# Patient Record
Sex: MALE | Race: WHITE | Employment: OTHER | URBAN - METROPOLITAN AREA
[De-identification: names, ages, dates, MRNs, and addresses within clinical notes are randomized per-mention and may not be internally consistent; named-entity substitution may affect disease eponyms.]

---

## 2021-08-02 ENCOUNTER — OFFICE VISIT (OUTPATIENT)
Dept: URGENT CARE | Facility: PHYSICIAN GROUP | Age: 66
End: 2021-08-02
Payer: MEDICARE

## 2021-08-02 VITALS
WEIGHT: 235 LBS | HEART RATE: 90 BPM | DIASTOLIC BLOOD PRESSURE: 70 MMHG | OXYGEN SATURATION: 97 % | SYSTOLIC BLOOD PRESSURE: 120 MMHG | RESPIRATION RATE: 16 BRPM | BODY MASS INDEX: 30.16 KG/M2 | TEMPERATURE: 98.2 F | HEIGHT: 74 IN

## 2021-08-02 DIAGNOSIS — H18.821 CORNEAL ABRASION OF RIGHT EYE DUE TO CONTACT LENS: ICD-10-CM

## 2021-08-02 PROCEDURE — 99203 OFFICE O/P NEW LOW 30 MIN: CPT | Performed by: NURSE PRACTITIONER

## 2021-08-02 RX ORDER — TOBRAMYCIN AND DEXAMETHASONE 3; 1 MG/ML; MG/ML
1 SUSPENSION/ DROPS OPHTHALMIC
Qty: 5 ML | Refills: 0 | Status: SHIPPED | OUTPATIENT
Start: 2021-08-02 | End: 2021-08-09

## 2021-08-02 RX ORDER — TOBRAMYCIN AND DEXAMETHASONE 3; 1 MG/ML; MG/ML
1 SUSPENSION/ DROPS OPHTHALMIC
Qty: 10 ML | Refills: 0 | Status: SHIPPED | OUTPATIENT
Start: 2021-08-02 | End: 2021-08-02

## 2021-08-02 ASSESSMENT — ENCOUNTER SYMPTOMS
NAUSEA: 0
FOREIGN BODY SENSATION: 1
EYE PAIN: 1
EYE REDNESS: 1
PHOTOPHOBIA: 1
HEADACHES: 0
VOMITING: 0
EYE DISCHARGE: 0
FEVER: 0
DOUBLE VISION: 0
BLURRED VISION: 1
EYE ITCHING: 1

## 2021-08-02 ASSESSMENT — VISUAL ACUITY: OU: 1

## 2021-08-02 NOTE — PROGRESS NOTES
Subjective:     Jimmy Amaya is a 66 y.o. male who presents for Eye Swelling (R eye pain, red, possible scratch, painful to open eye, x2 days )      Eye Problem   The right eye is affected. This is a new problem. The current episode started yesterday (Jimmy comes to clinic today with complaints of right-sided eye pain after removing his contact yesterday.). The problem occurs constantly. The problem has been unchanged. The pain is at a severity of 9/10. The pain is severe. There is no known exposure to pink eye. He wears contacts. Associated symptoms include blurred vision, eye redness, a foreign body sensation, itching and photophobia. Pertinent negatives include no eye discharge (Eye watering), double vision, fever, nausea, recent URI or vomiting. He has tried nothing for the symptoms.         Review of Systems   Constitutional: Negative for fever.   Eyes: Positive for blurred vision, photophobia, pain, redness and itching. Negative for double vision and discharge (Eye watering).   Gastrointestinal: Negative for nausea and vomiting.   Neurological: Negative for headaches.   Endo/Heme/Allergies: Negative for environmental allergies.       PMH: History reviewed. No pertinent past medical history.  ALLERGIES: No Known Allergies  SURGHX: History reviewed. No pertinent surgical history.  SOCHX:   Social History     Socioeconomic History   • Marital status: Unknown     Spouse name: Not on file   • Number of children: Not on file   • Years of education: Not on file   • Highest education level: Not on file   Occupational History   • Not on file   Tobacco Use   • Smoking status: Never Smoker   • Smokeless tobacco: Never Used   Substance and Sexual Activity   • Alcohol use: Not Currently   • Drug use: Never   • Sexual activity: Not on file   Other Topics Concern   • Not on file   Social History Narrative   • Not on file     Social Determinants of Health     Financial Resource Strain:    • Difficulty of Paying Living  "Expenses:    Food Insecurity:    • Worried About Running Out of Food in the Last Year:    • Ran Out of Food in the Last Year:    Transportation Needs:    • Lack of Transportation (Medical):    • Lack of Transportation (Non-Medical):    Physical Activity:    • Days of Exercise per Week:    • Minutes of Exercise per Session:    Stress:    • Feeling of Stress :    Social Connections:    • Frequency of Communication with Friends and Family:    • Frequency of Social Gatherings with Friends and Family:    • Attends [employer] Services:    • Active Member of Clubs or Organizations:    • Attends Club or Organization Meetings:    • Marital Status:    Intimate Partner Violence:    • Fear of Current or Ex-Partner:    • Emotionally Abused:    • Physically Abused:    • Sexually Abused:      FH: History reviewed. No pertinent family history.      Objective:   /70 (BP Location: Left arm, Patient Position: Sitting, BP Cuff Size: Large adult)   Pulse 90   Temp 36.8 °C (98.2 °F) (Temporal)   Resp 16   Ht 1.88 m (6' 2\")   Wt 107 kg (235 lb)   SpO2 97%   BMI 30.17 kg/m²     Physical Exam  Vitals and nursing note reviewed.   Constitutional:       General: He is not in acute distress.     Appearance: Normal appearance. He is normal weight. He is not ill-appearing or toxic-appearing.   HENT:      Head: Normocephalic.      Right Ear: External ear normal.      Left Ear: External ear normal.      Nose: Nose normal.      Mouth/Throat:      Mouth: Mucous membranes are moist.   Eyes:      General: Lids are everted, no foreign bodies appreciated. Vision grossly intact. Gaze aligned appropriately.         Right eye: No foreign body, discharge or hordeolum.         Left eye: No discharge.      Extraocular Movements: Extraocular movements intact.      Conjunctiva/sclera:      Right eye: Right conjunctiva is injected. Chemosis present. No exudate or hemorrhage.     Pupils: Pupils are equal, round, and reactive to light.      Right eye: " Pupil is round, reactive and not sluggish. Corneal abrasion and fluorescein uptake present.      Funduscopic exam:     Right eye: No hemorrhage.   Cardiovascular:      Rate and Rhythm: Normal rate and regular rhythm.   Pulmonary:      Effort: Pulmonary effort is normal.      Breath sounds: Normal breath sounds.   Abdominal:      General: Abdomen is flat.   Musculoskeletal:         General: Normal range of motion.      Cervical back: Normal range of motion and neck supple. No rigidity.   Lymphadenopathy:      Cervical: No cervical adenopathy.   Skin:     General: Skin is warm and dry.   Neurological:      General: No focal deficit present.      Mental Status: He is alert and oriented to person, place, and time. Mental status is at baseline.   Psychiatric:         Mood and Affect: Mood normal.         Behavior: Behavior normal.         Judgment: Judgment normal.         Assessment/Plan:   Assessment    1. Corneal abrasion of right eye due to contact lens  tobramycin-dexamethasone (TOBRADEX) 0.3-0.1 % Suspension    DISCONTINUED: tobramycin-dexamethasone (TOBRADEX) 0.3-0.1 % Suspension     Supportive treatment discussed.  He was encouraged to use refresh eye lubrication every hour for relief of pain.   AVS handout given and reviewed with patient. Pt educated on red flags and when to seek treatment back in ER or UC.

## 2021-08-02 NOTE — PATIENT INSTRUCTIONS
Corneal Abrasion    A corneal abrasion is a scratch or injury to the clear covering over the front of your eye (cornea). Your cornea forms a clear dome that protects your eye and helps to focus your vision. Your cornea is made up of many layers. The surface layer is a single layer of cells (corneal epithelium). It is one of the most sensitive tissues in your body. A corneal abrasion can be very painful.  If a corneal abrasion is not treated, it can become infected and cause an ulcer. This can lead to scarring. A scarred cornea can affect your vision. Sometimes abrasions come back in the same area, even after the original injury has healed (recurrent erosion syndrome).  What are the causes?  This condition may be caused by:  · A poke in the eye.  · A gritty or irritating substance (foreign body) in the eye.  · Excessive eye rubbing.  · Very dry eyes.  · Certain eye infections.  · Contact lenses that fit poorly or are worn for a long period of time. You can also injure your cornea when putting contacts lenses in your eye or taking them out.  · Eye surgery.  Sometimes, the cause is unknown.  What are the signs or symptoms?  Symptoms of this condition include:  · Eye pain. The pain may get worse when your eye is open or when you move your eye.  · A feeling of something stuck in your eye.  · Having trouble keeping your eye open, or not being able to keep it open.  · Tearing and redness.  · Sensitivity to light.  · Blurred vision.  · Headache.  How is this diagnosed?  This condition may be diagnosed based on:  · Your medical history.  · Your symptoms.  · An eye exam. You may work with a health care provider who specializes in diseases and conditions of the eye (ophthalmologist). Before the eye exam, numbing drops may be put into your eye. You may also have dye put in your eye with a dropper or a small paper strip. The dye makes the abrasion easy to see when your ophthalmologist examines your eye with a light. Your  ophthalmologist may look at your eye through an eye scope (slit lamp).  How is this treated?  Treatment may vary depending on the cause of your condition, and it may include:  · Washing out your eye.  · Removing any foreign body.  · Antibiotic drops or ointment to treat an infection.  · Steroid drops or ointment to treat redness, irritation, or inflammation.  · Pain medicine.  · An eye patch to keep your eye closed.  Follow these instructions at home:  Medicines  · Use eye drops or ointments as told by your eye care provider.  · If you were prescribed antibiotic drops or ointment, use them as told by your eye care provider. Do not stop using the antibiotic even if you start to feel better.  · Take over-the-counter and prescription medicines only as told by your eye care provider.  · Do not drive or use heavy machinery while taking prescription pain medicine.  General instructions  · If you have an eye patch, wear it as told by your eye care provider.  ? Do not drive or use machinery while wearing an eye patch. Your ability to  distances will be impaired.  ? Follow instructions from your eye care provider about when to remove the patch.  · Ask your eye care provider whether you can use a cold, wet cloth (compress) on your eye to relieve pain.  · Do not rub or touch your eye. Do not wash out your eye.  · Do not wear contact lenses until your eye care provider says that this is okay.  · Avoid bright light and eye strain.  · Keep all follow-up visits as told by your eye care provider. This is important for preventing infection and vision loss.  Contact a health care provider if:  · You continue to have eye pain and other symptoms for more than 2 days.  · You develop new symptoms, such as redness, tearing, or discharge.  · You have discharge that makes your eyelids stick together in the morning.  · Your eye patch becomes so loose that you can blink your eye.  · Symptoms return after the original abrasion has  healed.  Get help right away if:  · You have severe eye pain that does not get better with medicine.  · You have vision loss.  Summary  · A corneal abrasion is a scratch on the outer layer of the clear covering over the front of your eye (cornea).  · Corneal abrasion can cause eye pain, redness, tearing, and blurred vision.  · This condition is usually treated with medicine to prevent infection and scarring. You also may have to wear an eye patch to cover your eye.  · Let your eye care provider know if your symptoms continue for more than 2 days.  This information is not intended to replace advice given to you by your health care provider. Make sure you discuss any questions you have with your health care provider.  Document Released: 12/15/2001 Document Revised: 11/28/2017 Document Reviewed: 11/28/2017  [employer] Patient Education © 2020 [employer]

## 2022-03-21 ENCOUNTER — HOSPITAL ENCOUNTER (EMERGENCY)
Facility: MEDICAL CENTER | Age: 67
End: 2022-03-21
Attending: EMERGENCY MEDICINE
Payer: MEDICARE

## 2022-03-21 VITALS
TEMPERATURE: 96.8 F | DIASTOLIC BLOOD PRESSURE: 82 MMHG | RESPIRATION RATE: 16 BRPM | HEART RATE: 92 BPM | HEIGHT: 74 IN | OXYGEN SATURATION: 95 % | BODY MASS INDEX: 34.23 KG/M2 | SYSTOLIC BLOOD PRESSURE: 134 MMHG | WEIGHT: 266.76 LBS

## 2022-03-21 DIAGNOSIS — G51.0 BELL'S PALSY: ICD-10-CM

## 2022-03-21 PROCEDURE — 99282 EMERGENCY DEPT VISIT SF MDM: CPT

## 2022-03-21 RX ORDER — VALACYCLOVIR HYDROCHLORIDE 1 G/1
1000 TABLET, FILM COATED ORAL 2 TIMES DAILY
Qty: 14 TABLET | Refills: 0 | Status: SHIPPED | OUTPATIENT
Start: 2022-03-21 | End: 2022-03-28

## 2022-03-21 RX ORDER — PREDNISONE 20 MG/1
60 TABLET ORAL DAILY
Qty: 21 TABLET | Refills: 0 | Status: SHIPPED | OUTPATIENT
Start: 2022-03-21 | End: 2022-03-28

## 2022-03-21 NOTE — ED NOTES
Pt ambulate to room with steady gait, agree with triage notes. Pt presents with left sided facial droop, mild paralysis noted to eyebrow and lip. Pt reports face was completely numb on Friday, trauma to lip from biting his lip. Pt notes numbness leading to trauma. Notes HA x 3 days, denies HA at this time. Notes droop is resolving compared to initial onset.

## 2022-03-21 NOTE — ED TRIAGE NOTES
"Chief Complaint   Patient presents with   • Facial Droop     Left side. Pt states it started Friday. Symptoms have improved since Friday per pt. Facial droop noted. No weakness noted to extremities. Pt alert and oriented, speaking in full sentences.    • Headache     Started Friday.      Pt assisted to triage with above complaint.     /88   Pulse (!) 106   Temp 36 °C (96.8 °F) (Temporal)   Resp 16   Ht 1.88 m (6' 2\")   Wt 121 kg (266 lb 12.1 oz)   SpO2 93%   BMI 34.25 kg/m²       "

## 2022-03-21 NOTE — ED PROVIDER NOTES
ED Provider Note    Scribed for Abhijeet Garcia M.D. by Susan Mcneil. 3/21/2022, 3:31 PM.    Primary care provider: Pcp Pt States None  Means of arrival: Walk-In  History obtained from: Patient  History limited by: None    CHIEF COMPLAINT  Chief Complaint   Patient presents with    Facial Droop     Left side. Pt states it started Friday. Symptoms have improved since Friday per pt. Facial droop noted. No weakness noted to extremities. Pt alert and oriented, speaking in full sentences.     Headache     Started Friday.        HPI  Jimmy Amaya is a 66 y.o. male who presents to the Emergency Department for evaluation of an acute, intermittent, facial numbness onset three days ago. Patient states that about three days ago, he began having numbness in his face. He notes improvement since onset, but states that his symptoms have not completely resolved, prompting him to present to the ED today for further evaluation. Patient has associated headache, left-sided facial droop and lip numbness. Denies any cough, fever, decreased sensation, or difficulty speaking. No alleviating or exacerbating factors reported. No known drug allergies.    REVIEW OF SYSTEMS  As above otherwise all other systems are negative    PAST MEDICAL HISTORY  None noted    SURGICAL HISTORY  patient denies any surgical history    SOCIAL HISTORY  Social History     Tobacco Use    Smoking status: Never Smoker    Smokeless tobacco: Never Used   Vaping Use    Vaping Use: Never used   Substance Use Topics    Alcohol use: Not Currently    Drug use: Never      Social History     Substance and Sexual Activity   Drug Use Never       FAMILY HISTORY  History reviewed. No pertinent family history.    CURRENT MEDICATIONS  Home Medications       Reviewed by Shelly Jean-Baptiste R.N. (Registered Nurse) on 03/21/22 at 1219  Med List Status: <None>     Medication Last Dose Status   methylPREDNISolone (MEDROL, AISHWARYA,) 4 MG tablet  Active   naproxen (NAPROSYN) 500 MG TABS  " Active   naproxen (NAPROSYN) 500 MG TABS  Active                    ALLERGIES  No Known Allergies    PHYSICAL EXAM  VITAL SIGNS: /88   Pulse (!) 106   Temp 36 °C (96.8 °F) (Temporal)   Resp 16   Ht 1.88 m (6' 2\")   Wt 121 kg (266 lb 12.1 oz)   SpO2 93%   BMI 34.25 kg/m²     Constitutional: Well developed, Well nourished, No acute distress, Non-toxic appearance.   HENT: Normocephalic, Atraumatic, Bilateral external ears normal, oropharynx moist, No oral exudates, Nose normal.   Eyes:conjunctiva is normal, there are no signs of exudate.   Neck: Supple, no meningeal signs.  Lymphatic: No lymphadenopathy noted.   Cardiovascular: Regular rate and rhythm without murmurs gallops or rubs.   Thorax & Lungs: No respiratory distress. Breathing comfortably. Lungs are clear to auscultation bilaterally, there are no wheezes no rales. Chest wall is nontender.  Abdomen: Soft, nontender, nondistended. Bowel sounds are present.   Skin: Warm, Dry, No erythema,   Back: No tenderness, No CVA tenderness.  Musculoskeletal: Good range of motion in all major joints. No tenderness to palpation or major deformities noted. Intact distal pulses, no clubbing, no cyanosis, no edema,   Neurologic: Cranial nerves II-XII grossly intact except for CN VII, CN VII deficit - sluggish in forehead, eyelid, and lips but still has good functioning, minimal paralysis no other focal deficits, moving all 4 extremities, 5/5 strength in all 4 extremities  Psychiatric: Affect normal, Judgment normal, Mood normal.       COURSE & MEDICAL DECISION MAKING  Pertinent Labs & Imaging studies reviewed. (See chart for details)    3:31 PM - Patient seen and examined at bedside. After my exam, I explained to the patient and his wife that the patient's symptoms are consistent with Planada Palsy. After explaining the nature of the condition, I discussed the plan of care, which includes sending the patient home with a prescription for medication. Patient " understands and verbalizes agreement to plan of care. I then informed the patient of my plan for discharge, which includes strict return precautions for any new or worsening symptoms. Patient understands and verbalizes agreement to plan of care. Patient is comfortable going home at this time.    Decision Making:  Patient has an isolated cranial nerve VII deficit on the left side.  The patient is unable to fully extend his left short left eyebrow eyelid is unable to fully close and he does have a slight facial droop on the left side.  He is able to smile fully he is able to pull up his eyebrows fully so it is mild paresis on that side.  This is Bell's palsy.  I will start the patient on steroids and Valtrex.  The patient is to follow the primary care physician in 1 week for recheck return if any symptoms worsen.    The patient will return for new or worsening symptoms and is stable at the time of discharge.    The patient is referred to a primary physician for blood pressure management, diabetic screening, and for all other preventative health concerns.    DISPOSITION:  Patient will be discharged home in stable condition.    FOLLOW UP:  Your PCP    Schedule an appointment as soon as possible for a visit in 1 week  For re-check, Return if any symptoms worsen      OUTPATIENT MEDICATIONS:  Discharge Medication List as of 3/21/2022  3:50 PM        START taking these medications    Details   predniSONE (DELTASONE) 20 MG Tab Take 3 Tablets by mouth every day for 7 days., Disp-21 Tablet, R-0, Normal      valacyclovir (VALTREX) 1 GM Tab Take 1 Tablet by mouth 2 times a day for 7 days., Disp-14 Tablet, R-0, Normal              FINAL IMPRESSION  1. Bell's palsy          Susan LOVELL (Jordi), am scribing for, and in the presence of, Abhijeet Garcia M.D..    Electronically signed by: Susan Latham), 3/21/2022    Abhijeet LOVELL M.D. personally performed the services described in this documentation, as  scribed by Susan Mcneil in my presence, and it is both accurate and complete.    C    The note accurately reflects work and decisions made by me.  Abhijeet Garcia M.D.  3/21/2022  7:46 PM

## 2022-10-07 ENCOUNTER — OFFICE VISIT (OUTPATIENT)
Dept: URGENT CARE | Facility: PHYSICIAN GROUP | Age: 67
End: 2022-10-07
Payer: MEDICARE

## 2022-10-07 VITALS
BODY MASS INDEX: 34.14 KG/M2 | OXYGEN SATURATION: 97 % | TEMPERATURE: 98.7 F | SYSTOLIC BLOOD PRESSURE: 144 MMHG | WEIGHT: 266 LBS | HEIGHT: 74 IN | DIASTOLIC BLOOD PRESSURE: 70 MMHG | RESPIRATION RATE: 16 BRPM | HEART RATE: 95 BPM

## 2022-10-07 DIAGNOSIS — L97.509 FOOT ULCER DUE TO SECONDARY DM (HCC): ICD-10-CM

## 2022-10-07 DIAGNOSIS — E13.621 FOOT ULCER DUE TO SECONDARY DM (HCC): ICD-10-CM

## 2022-10-07 PROCEDURE — 99213 OFFICE O/P EST LOW 20 MIN: CPT | Performed by: FAMILY MEDICINE

## 2022-10-07 RX ORDER — SULFAMETHOXAZOLE AND TRIMETHOPRIM 800; 160 MG/1; MG/1
1 TABLET ORAL 2 TIMES DAILY
Qty: 10 TABLET | Refills: 0 | Status: SHIPPED | OUTPATIENT
Start: 2022-10-07 | End: 2022-10-12

## 2022-10-07 ASSESSMENT — ENCOUNTER SYMPTOMS
FEVER: 0
TINGLING: 1

## 2022-10-07 NOTE — PROGRESS NOTES
"Subjective:     Jimmy Amaya is a 67 y.o. male who presents for Foot Ulcer (Ulcer on left foot)    HPI  Pt presents for evaluation of an acute problem  Patient with ulcer on the bottom of left foot  Noticed it a week ago  Having no pain in the area  Chronically has decreased sensation in his feet due to diabetes  Has not had a primary care provider for the last few years and has stopped taking diabetic medications  No significant redness in the foot  No significant swelling  Has some mild discharge from the area    Review of Systems   Constitutional:  Negative for fever.   Neurological:  Positive for tingling.     PMH:  has no past medical history on file.  MEDS:   Current Outpatient Medications:     sulfamethoxazole-trimethoprim (BACTRIM DS) 800-160 MG tablet, Take 1 Tablet by mouth 2 times a day for 5 days., Disp: 10 Tablet, Rfl: 0  ALLERGIES: No Known Allergies  SURGHX: History reviewed. No pertinent surgical history.  SOCHX:  reports that he has never smoked. He has never used smokeless tobacco. He reports that he does not currently use alcohol. He reports that he does not use drugs.     Objective:   BP (!) 144/70   Pulse 95   Temp 37.1 °C (98.7 °F) (Temporal)   Resp 16   Ht 1.88 m (6' 2\")   Wt 121 kg (266 lb)   SpO2 97%   BMI 34.15 kg/m²     Physical Exam  Constitutional:       General: He is not in acute distress.     Appearance: He is well-developed. He is not diaphoretic.   Pulmonary:      Effort: Pulmonary effort is normal.   Skin:     Comments: Left plantar forefoot with  partial thickness ulceration, no subcutaneous fat visible, no active discharge, no significant warmth or surrounding erythema   Neurological:      Mental Status: He is alert.     Assessment/Plan:   Assessment    1. Foot ulcer due to secondary DM (HCC)  - sulfamethoxazole-trimethoprim (BACTRIM DS) 800-160 MG tablet; Take 1 Tablet by mouth 2 times a day for 5 days.  Dispense: 10 Tablet; Refill: 0  - Referral to Podiatry    Patient " with an ulcer of the left plantar forefoot for the past week.  He has decreased sensation throughout his forefoot.  No significant warmth or erythema.  Did recommend caution and course of antibiotics to prevent any significant developing infection as he awaits referral to podiatry.  Area was wrapped with sterile dressings and reviewed local wound care measures.  He will establish with a PCP to get his diabetes under control and follow-up with podiatry for long-term management of ulcer.

## 2022-11-14 ENCOUNTER — HOSPITAL ENCOUNTER (OUTPATIENT)
Facility: MEDICAL CENTER | Age: 67
End: 2022-11-14
Attending: FAMILY MEDICINE
Payer: MEDICARE

## 2022-11-14 ENCOUNTER — OFFICE VISIT (OUTPATIENT)
Dept: MEDICAL GROUP | Facility: PHYSICIAN GROUP | Age: 67
End: 2022-11-14
Payer: MEDICARE

## 2022-11-14 VITALS
OXYGEN SATURATION: 97 % | HEART RATE: 98 BPM | DIASTOLIC BLOOD PRESSURE: 80 MMHG | HEIGHT: 74 IN | BODY MASS INDEX: 33.66 KG/M2 | TEMPERATURE: 98.2 F | SYSTOLIC BLOOD PRESSURE: 138 MMHG | WEIGHT: 262.3 LBS

## 2022-11-14 DIAGNOSIS — E11.42 TYPE 2 DIABETES MELLITUS WITH DIABETIC POLYNEUROPATHY, WITHOUT LONG-TERM CURRENT USE OF INSULIN (HCC): ICD-10-CM

## 2022-11-14 DIAGNOSIS — L97.529 FOOT ULCER, LEFT, WITH UNSPECIFIED SEVERITY (HCC): ICD-10-CM

## 2022-11-14 DIAGNOSIS — R09.89 ABSENT PEDAL PULSES: ICD-10-CM

## 2022-11-14 DIAGNOSIS — R03.0 ELEVATED BLOOD PRESSURE READING IN OFFICE WITHOUT DIAGNOSIS OF HYPERTENSION: ICD-10-CM

## 2022-11-14 DIAGNOSIS — E66.9 OBESITY (BMI 30-39.9): ICD-10-CM

## 2022-11-14 LAB
CREAT UR-MCNC: 110.47 MG/DL
HBA1C MFR BLD: 10.9 % (ref 0–5.6)
INT CON NEG: ABNORMAL
INT CON POS: ABNORMAL
MICROALBUMIN UR-MCNC: 1.7 MG/DL
MICROALBUMIN/CREAT UR: 15 MG/G (ref 0–30)

## 2022-11-14 PROCEDURE — 82043 UR ALBUMIN QUANTITATIVE: CPT

## 2022-11-14 PROCEDURE — 99214 OFFICE O/P EST MOD 30 MIN: CPT | Performed by: FAMILY MEDICINE

## 2022-11-14 PROCEDURE — 83036 HEMOGLOBIN GLYCOSYLATED A1C: CPT | Performed by: FAMILY MEDICINE

## 2022-11-14 PROCEDURE — 82570 ASSAY OF URINE CREATININE: CPT

## 2022-11-14 RX ORDER — ROSUVASTATIN CALCIUM 10 MG/1
10 TABLET, COATED ORAL EVERY EVENING
Qty: 30 TABLET | Refills: 11 | Status: SHIPPED | OUTPATIENT
Start: 2022-11-14 | End: 2023-01-02

## 2022-11-14 RX ORDER — METFORMIN HYDROCHLORIDE 500 MG/1
1000 TABLET, EXTENDED RELEASE ORAL 2 TIMES DAILY
Qty: 180 TABLET | Refills: 3 | Status: SHIPPED | OUTPATIENT
Start: 2022-11-14 | End: 2023-01-02

## 2022-11-14 ASSESSMENT — PATIENT HEALTH QUESTIONNAIRE - PHQ9: CLINICAL INTERPRETATION OF PHQ2 SCORE: 0

## 2022-11-14 NOTE — PROGRESS NOTES
"CHIEF COMPLAINT / REASON FOR VISIT  Jimmy Amaya is a 67 y.o. male that presents today to establish care.    HISTORY OF PRESENT ILLNESS    Diabetes: POCT A1c 10.9    Was on metformin previously at least 10 years, reported that he didn't feel very well on it.   - progressive vision blurring, saw optometrist 3-4 months ago who said he had cataracts     He has been working on his diet recently.   Walks dogs 4-8 miles per day.    Denies exertional chest pain.  Endorses mild lower extremity edema. No orthopnea.    Past Medical History  Type 2 diabetes mellitus with peripheral neuropathy  Left plantar forefoot diabetic foot ulceration (1st MTP and great toe)  Obesity    OBJECTIVE    /80 (BP Location: Left arm, Patient Position: Sitting, BP Cuff Size: Adult)   Pulse 98   Temp 36.8 °C (98.2 °F) (Temporal)   Ht 1.88 m (6' 2\")   Wt 119 kg (262 lb 4.8 oz)   SpO2 97%   BMI 33.68 kg/m²  Body mass index is 33.68 kg/m².    PHYSICAL EXAM  Constitutional: Sitting comfortably, in no acute distress, responds to questions appropriately.  Head: Normocephalic  Eyes:  No conjunctival injection, no scleral icterus, PERRL  Ears: External ear canals clear, TMs pearly grey with visualized bony landmarks and crisp light reflex  Mouth: Oral mucosa moist  Throat: Oropharynx clear without erythema or tonsillar exudates  Neck: Palpable anterior cervical lymph nodes bilateral, nontender, mobile  Heart: Regular S1 S2, no murmurs, rub, or gallops  Lungs: Clear to auscultation bilaterally, no wheezes, rales, or rhonchi  Extremities: No sensation to monofilament in bilateral feet and shins.  Foot ulceration on plantar aspect of left first MTP, preulcerative calluses on bilateral plantar forefeet, fresh open wound left distal great toe.  Pedal pulses are nonpalpable bilaterally (both dorsalis pedis and posterior tibial).  Feet are cold and hairless with dependent rubor    ASSESSMENT & PLAN  1. Type 2 diabetes mellitus with diabetic " polyneuropathy, without long-term current use of insulin (HCC)  Chronic, uncontrolled with hemoglobin A1c 10.9.  Has severe lower extremity peripheral neuropathy with diabetic foot ulceration.  We will reinitiate both metformin (uptitrating to 2 g daily) and semaglutide (Ozempic) injection starting at 0.25 mg once weekly.  Follow-up in 1 month.  - POCT A1C  - Microalbumin Creat Ratio Urine - Clinic Collect; Future  - Diabetic Monofilament Lower Extremity Exam  - Lipid Profile; Future  - CBC WITH DIFFERENTIAL; Future  - Comp Metabolic Panel; Future    2. Foot ulcer, left, with unspecified severity (HCC)  Diabetic foot ulceration at plantar aspect of left first MTP.  Has podiatry referral already ordered from previous urgent care visit, this has been approved by insurance and he agrees to schedule this visit.  His exam is also suspicious for severe peripheral arterial disease, will obtain lower extremity arterial ultrasound with ABIs for further evaluation.  - US-EXTREMITY ARTERY LOWER BILAT W/ONUR (COMBO); Future    3. Absent pedal pulses  - US-EXTREMITY ARTERY LOWER BILAT W/ONUR (COMBO); Future    4. Elevated blood pressure reading in office without diagnosis of hypertension  Blood pressure 138/80.  Ideal less than 130/80.  We will see back in 1 month with repeat blood pressure check  - Lipid Profile; Future  - CBC WITH DIFFERENTIAL; Future  - Comp Metabolic Panel; Future    5. Obesity (BMI 30-39.9)  - Patient identified as having weight management issue.  Appropriate orders and counseling given.  - Lipid Profile; Future  - CBC WITH DIFFERENTIAL; Future  - Comp Metabolic Panel; Future

## 2022-11-14 NOTE — PATIENT INSTRUCTIONS
Metformin 1 tablet per day for 1 week, then 2 tablets per day for 1 week, then 3 tablets per day for 1 week, then final dose of 4 tablets per day (can split into 2 tablets in morning, 2 tablets at night)    Ozempic injections once per week    Schedule podiatry visit for foot ulcers.   Schedule ultrasound to evaluate arterial blood flow to lower extremities (for wound healing)

## 2023-01-02 ENCOUNTER — APPOINTMENT (OUTPATIENT)
Dept: RADIOLOGY | Facility: MEDICAL CENTER | Age: 68
DRG: 872 | End: 2023-01-02
Attending: EMERGENCY MEDICINE
Payer: MEDICARE

## 2023-01-02 ENCOUNTER — APPOINTMENT (OUTPATIENT)
Dept: RADIOLOGY | Facility: IMAGING CENTER | Age: 68
End: 2023-01-02
Attending: FAMILY MEDICINE
Payer: MEDICARE

## 2023-01-02 ENCOUNTER — OFFICE VISIT (OUTPATIENT)
Dept: URGENT CARE | Facility: PHYSICIAN GROUP | Age: 68
End: 2023-01-02
Payer: MEDICARE

## 2023-01-02 ENCOUNTER — HOSPITAL ENCOUNTER (INPATIENT)
Facility: MEDICAL CENTER | Age: 68
LOS: 2 days | DRG: 872 | End: 2023-01-04
Attending: EMERGENCY MEDICINE | Admitting: HOSPITALIST
Payer: MEDICARE

## 2023-01-02 VITALS
DIASTOLIC BLOOD PRESSURE: 68 MMHG | WEIGHT: 245 LBS | HEART RATE: 133 BPM | OXYGEN SATURATION: 94 % | HEIGHT: 74 IN | TEMPERATURE: 99.8 F | SYSTOLIC BLOOD PRESSURE: 144 MMHG | BODY MASS INDEX: 31.44 KG/M2 | RESPIRATION RATE: 18 BRPM

## 2023-01-02 DIAGNOSIS — E11.628 DIABETIC FOOT INFECTION (HCC): ICD-10-CM

## 2023-01-02 DIAGNOSIS — R00.0 TACHYCARDIA: ICD-10-CM

## 2023-01-02 DIAGNOSIS — R11.2 NAUSEA AND VOMITING, UNSPECIFIED VOMITING TYPE: ICD-10-CM

## 2023-01-02 DIAGNOSIS — L97.421 DIABETIC ULCER OF LEFT MIDFOOT ASSOCIATED WITH DIABETES MELLITUS DUE TO UNDERLYING CONDITION, LIMITED TO BREAKDOWN OF SKIN (HCC): ICD-10-CM

## 2023-01-02 DIAGNOSIS — E08.621 DIABETIC ULCER OF LEFT MIDFOOT ASSOCIATED WITH DIABETES MELLITUS DUE TO UNDERLYING CONDITION, LIMITED TO BREAKDOWN OF SKIN (HCC): ICD-10-CM

## 2023-01-02 DIAGNOSIS — M79.672 LEFT FOOT PAIN: ICD-10-CM

## 2023-01-02 DIAGNOSIS — L08.9 DIABETIC FOOT INFECTION (HCC): ICD-10-CM

## 2023-01-02 DIAGNOSIS — L08.9 LEFT FOOT INFECTION: ICD-10-CM

## 2023-01-02 PROBLEM — A41.9 SEPSIS (HCC): Status: ACTIVE | Noted: 2023-01-02

## 2023-01-02 PROBLEM — E11.59 TYPE 2 DIABETES MELLITUS WITH CIRCULATORY DISORDER, WITHOUT LONG-TERM CURRENT USE OF INSULIN (HCC): Status: ACTIVE | Noted: 2023-01-02

## 2023-01-02 LAB
ANION GAP SERPL CALC-SCNC: 11 MMOL/L (ref 7–16)
BASOPHILS # BLD AUTO: 0.2 % (ref 0–1.8)
BASOPHILS # BLD: 0.04 K/UL (ref 0–0.12)
BUN SERPL-MCNC: 19 MG/DL (ref 8–22)
CALCIUM SERPL-MCNC: 9.1 MG/DL (ref 8.4–10.2)
CHLORIDE SERPL-SCNC: 97 MMOL/L (ref 96–112)
CO2 SERPL-SCNC: 22 MMOL/L (ref 20–33)
CREAT SERPL-MCNC: 0.98 MG/DL (ref 0.5–1.4)
CRP SERPL HS-MCNC: 22.64 MG/DL (ref 0–0.75)
EOSINOPHIL # BLD AUTO: 0.01 K/UL (ref 0–0.51)
EOSINOPHIL NFR BLD: 0.1 % (ref 0–6.9)
ERYTHROCYTE [DISTWIDTH] IN BLOOD BY AUTOMATED COUNT: 43.8 FL (ref 35.9–50)
ERYTHROCYTE [SEDIMENTATION RATE] IN BLOOD BY WESTERGREN METHOD: 20 MM/HOUR (ref 0–20)
FLUAV RNA SPEC QL NAA+PROBE: NEGATIVE
FLUAV+FLUBV AG SPEC QL IA: NEGATIVE
FLUBV RNA SPEC QL NAA+PROBE: NEGATIVE
GFR SERPLBLD CREATININE-BSD FMLA CKD-EPI: 85 ML/MIN/1.73 M 2
GLUCOSE BLD STRIP.AUTO-MCNC: 231 MG/DL (ref 65–99)
GLUCOSE SERPL-MCNC: 257 MG/DL (ref 65–99)
HCT VFR BLD AUTO: 47 % (ref 42–52)
HGB BLD-MCNC: 15.5 G/DL (ref 14–18)
IMM GRANULOCYTES # BLD AUTO: 0.17 K/UL (ref 0–0.11)
IMM GRANULOCYTES NFR BLD AUTO: 0.9 % (ref 0–0.9)
INT CON NEG: NORMAL
INT CON POS: NORMAL
LACTATE SERPL-SCNC: 1.6 MMOL/L (ref 0.5–2)
LACTATE SERPL-SCNC: 1.8 MMOL/L (ref 0.5–2)
LYMPHOCYTES # BLD AUTO: 1.04 K/UL (ref 1–4.8)
LYMPHOCYTES NFR BLD: 5.5 % (ref 22–41)
MCH RBC QN AUTO: 28.1 PG (ref 27–33)
MCHC RBC AUTO-ENTMCNC: 33 G/DL (ref 33.7–35.3)
MCV RBC AUTO: 85.1 FL (ref 81.4–97.8)
MONOCYTES # BLD AUTO: 1.52 K/UL (ref 0–0.85)
MONOCYTES NFR BLD AUTO: 8.1 % (ref 0–13.4)
NEUTROPHILS # BLD AUTO: 16.1 K/UL (ref 1.82–7.42)
NEUTROPHILS NFR BLD: 85.2 % (ref 44–72)
NRBC # BLD AUTO: 0 K/UL
NRBC BLD-RTO: 0 /100 WBC
PLATELET # BLD AUTO: 221 K/UL (ref 164–446)
PMV BLD AUTO: 10.2 FL (ref 9–12.9)
POTASSIUM SERPL-SCNC: 4.3 MMOL/L (ref 3.6–5.5)
RBC # BLD AUTO: 5.52 M/UL (ref 4.7–6.1)
RSV RNA SPEC QL NAA+PROBE: NEGATIVE
SARS-COV-2 RNA RESP QL NAA+PROBE: NOTDETECTED
SODIUM SERPL-SCNC: 130 MMOL/L (ref 135–145)
SPECIMEN SOURCE: NORMAL
WBC # BLD AUTO: 18.9 K/UL (ref 4.8–10.8)

## 2023-01-02 PROCEDURE — 87804 INFLUENZA ASSAY W/OPTIC: CPT | Performed by: FAMILY MEDICINE

## 2023-01-02 PROCEDURE — 96365 THER/PROPH/DIAG IV INF INIT: CPT

## 2023-01-02 PROCEDURE — 770001 HCHG ROOM/CARE - MED/SURG/GYN PRIV*

## 2023-01-02 PROCEDURE — 96366 THER/PROPH/DIAG IV INF ADDON: CPT

## 2023-01-02 PROCEDURE — 96367 TX/PROPH/DG ADDL SEQ IV INF: CPT

## 2023-01-02 PROCEDURE — C9803 HOPD COVID-19 SPEC COLLECT: HCPCS | Performed by: EMERGENCY MEDICINE

## 2023-01-02 PROCEDURE — 87040 BLOOD CULTURE FOR BACTERIA: CPT

## 2023-01-02 PROCEDURE — 82962 GLUCOSE BLOOD TEST: CPT

## 2023-01-02 PROCEDURE — 83605 ASSAY OF LACTIC ACID: CPT | Mod: 91

## 2023-01-02 PROCEDURE — 36415 COLL VENOUS BLD VENIPUNCTURE: CPT

## 2023-01-02 PROCEDURE — 86140 C-REACTIVE PROTEIN: CPT

## 2023-01-02 PROCEDURE — 700102 HCHG RX REV CODE 250 W/ 637 OVERRIDE(OP): Performed by: HOSPITALIST

## 2023-01-02 PROCEDURE — 85652 RBC SED RATE AUTOMATED: CPT

## 2023-01-02 PROCEDURE — 700105 HCHG RX REV CODE 258: Performed by: EMERGENCY MEDICINE

## 2023-01-02 PROCEDURE — A9270 NON-COVERED ITEM OR SERVICE: HCPCS | Performed by: HOSPITALIST

## 2023-01-02 PROCEDURE — 700105 HCHG RX REV CODE 258: Performed by: HOSPITALIST

## 2023-01-02 PROCEDURE — 83036 HEMOGLOBIN GLYCOSYLATED A1C: CPT

## 2023-01-02 PROCEDURE — 0241U HCHG SARS-COV-2 COVID-19 NFCT DS RESP RNA 4 TRGT MIC: CPT

## 2023-01-02 PROCEDURE — 700111 HCHG RX REV CODE 636 W/ 250 OVERRIDE (IP): Performed by: EMERGENCY MEDICINE

## 2023-01-02 PROCEDURE — 700111 HCHG RX REV CODE 636 W/ 250 OVERRIDE (IP): Performed by: HOSPITALIST

## 2023-01-02 PROCEDURE — 99285 EMERGENCY DEPT VISIT HI MDM: CPT

## 2023-01-02 PROCEDURE — 73630 X-RAY EXAM OF FOOT: CPT | Mod: TC,LT | Performed by: FAMILY MEDICINE

## 2023-01-02 PROCEDURE — 87641 MR-STAPH DNA AMP PROBE: CPT

## 2023-01-02 PROCEDURE — 96372 THER/PROPH/DIAG INJ SC/IM: CPT

## 2023-01-02 PROCEDURE — 99214 OFFICE O/P EST MOD 30 MIN: CPT | Performed by: FAMILY MEDICINE

## 2023-01-02 PROCEDURE — 99223 1ST HOSP IP/OBS HIGH 75: CPT | Mod: AI | Performed by: HOSPITALIST

## 2023-01-02 PROCEDURE — 80048 BASIC METABOLIC PNL TOTAL CA: CPT

## 2023-01-02 PROCEDURE — 85025 COMPLETE CBC W/AUTO DIFF WBC: CPT

## 2023-01-02 RX ORDER — ONDANSETRON 4 MG/1
4 TABLET, ORALLY DISINTEGRATING ORAL ONCE
Status: COMPLETED | OUTPATIENT
Start: 2023-01-02 | End: 2023-01-02

## 2023-01-02 RX ORDER — SODIUM CHLORIDE, SODIUM LACTATE, POTASSIUM CHLORIDE, AND CALCIUM CHLORIDE .6; .31; .03; .02 G/100ML; G/100ML; G/100ML; G/100ML
500 INJECTION, SOLUTION INTRAVENOUS
Status: DISCONTINUED | OUTPATIENT
Start: 2023-01-02 | End: 2023-01-04 | Stop reason: HOSPADM

## 2023-01-02 RX ORDER — ACETAMINOPHEN 325 MG/1
650 TABLET ORAL EVERY 6 HOURS PRN
Status: DISCONTINUED | OUTPATIENT
Start: 2023-01-02 | End: 2023-01-04 | Stop reason: HOSPADM

## 2023-01-02 RX ORDER — ENOXAPARIN SODIUM 100 MG/ML
40 INJECTION SUBCUTANEOUS DAILY
Status: DISCONTINUED | OUTPATIENT
Start: 2023-01-02 | End: 2023-01-04 | Stop reason: HOSPADM

## 2023-01-02 RX ORDER — SODIUM CHLORIDE 9 MG/ML
INJECTION, SOLUTION INTRAVENOUS CONTINUOUS
Status: DISCONTINUED | OUTPATIENT
Start: 2023-01-02 | End: 2023-01-04 | Stop reason: HOSPADM

## 2023-01-02 RX ORDER — INSULIN LISPRO 100 [IU]/ML
1-6 INJECTION, SOLUTION INTRAVENOUS; SUBCUTANEOUS
Status: DISCONTINUED | OUTPATIENT
Start: 2023-01-02 | End: 2023-01-03

## 2023-01-02 RX ORDER — SODIUM CHLORIDE, SODIUM LACTATE, POTASSIUM CHLORIDE, AND CALCIUM CHLORIDE .6; .31; .03; .02 G/100ML; G/100ML; G/100ML; G/100ML
30 INJECTION, SOLUTION INTRAVENOUS
Status: DISCONTINUED | OUTPATIENT
Start: 2023-01-02 | End: 2023-01-04 | Stop reason: HOSPADM

## 2023-01-02 RX ORDER — AMOXICILLIN 250 MG
2 CAPSULE ORAL 2 TIMES DAILY
Status: DISCONTINUED | OUTPATIENT
Start: 2023-01-02 | End: 2023-01-04 | Stop reason: HOSPADM

## 2023-01-02 RX ORDER — OXYCODONE HYDROCHLORIDE 5 MG/1
5 TABLET ORAL
Status: DISCONTINUED | OUTPATIENT
Start: 2023-01-02 | End: 2023-01-04 | Stop reason: HOSPADM

## 2023-01-02 RX ORDER — IBUPROFEN 200 MG
600 TABLET ORAL ONCE
Status: COMPLETED | OUTPATIENT
Start: 2023-01-02 | End: 2023-01-02

## 2023-01-02 RX ORDER — OXYCODONE HYDROCHLORIDE 5 MG/1
2.5 TABLET ORAL
Status: DISCONTINUED | OUTPATIENT
Start: 2023-01-02 | End: 2023-01-04 | Stop reason: HOSPADM

## 2023-01-02 RX ORDER — POLYETHYLENE GLYCOL 3350 17 G/17G
1 POWDER, FOR SOLUTION ORAL
Status: DISCONTINUED | OUTPATIENT
Start: 2023-01-02 | End: 2023-01-04 | Stop reason: HOSPADM

## 2023-01-02 RX ORDER — SODIUM CHLORIDE, SODIUM LACTATE, POTASSIUM CHLORIDE, AND CALCIUM CHLORIDE .6; .31; .03; .02 G/100ML; G/100ML; G/100ML; G/100ML
1000 INJECTION, SOLUTION INTRAVENOUS ONCE
Status: COMPLETED | OUTPATIENT
Start: 2023-01-02 | End: 2023-01-02

## 2023-01-02 RX ORDER — BISACODYL 10 MG
10 SUPPOSITORY, RECTAL RECTAL
Status: DISCONTINUED | OUTPATIENT
Start: 2023-01-02 | End: 2023-01-04 | Stop reason: HOSPADM

## 2023-01-02 RX ORDER — HYDROMORPHONE HYDROCHLORIDE 1 MG/ML
0.25 INJECTION, SOLUTION INTRAMUSCULAR; INTRAVENOUS; SUBCUTANEOUS
Status: DISCONTINUED | OUTPATIENT
Start: 2023-01-02 | End: 2023-01-04 | Stop reason: HOSPADM

## 2023-01-02 RX ADMIN — SODIUM CHLORIDE, POTASSIUM CHLORIDE, SODIUM LACTATE AND CALCIUM CHLORIDE 1000 ML: 600; 310; 30; 20 INJECTION, SOLUTION INTRAVENOUS at 17:02

## 2023-01-02 RX ADMIN — INSULIN LISPRO 2 UNITS: 100 INJECTION, SOLUTION INTRAVENOUS; SUBCUTANEOUS at 21:27

## 2023-01-02 RX ADMIN — INSULIN GLARGINE-YFGN 5 UNITS: 100 INJECTION, SOLUTION SUBCUTANEOUS at 18:44

## 2023-01-02 RX ADMIN — AMPICILLIN AND SULBACTAM 3 G: 1; 2 INJECTION, POWDER, FOR SOLUTION INTRAMUSCULAR; INTRAVENOUS at 16:13

## 2023-01-02 RX ADMIN — AMPICILLIN AND SULBACTAM 3 G: 1; 2 INJECTION, POWDER, FOR SOLUTION INTRAMUSCULAR; INTRAVENOUS at 21:22

## 2023-01-02 RX ADMIN — ENOXAPARIN SODIUM 40 MG: 40 INJECTION SUBCUTANEOUS at 18:36

## 2023-01-02 RX ADMIN — Medication 600 MG: at 11:05

## 2023-01-02 RX ADMIN — SODIUM CHLORIDE: 9 INJECTION, SOLUTION INTRAVENOUS at 21:21

## 2023-01-02 RX ADMIN — ONDANSETRON 4 MG: 4 TABLET, ORALLY DISINTEGRATING ORAL at 10:51

## 2023-01-02 RX ADMIN — VANCOMYCIN HYDROCHLORIDE 3000 MG: 1 INJECTION, POWDER, LYOPHILIZED, FOR SOLUTION INTRAVENOUS at 17:02

## 2023-01-02 RX ADMIN — OXYCODONE 5 MG: 5 TABLET ORAL at 22:40

## 2023-01-02 ASSESSMENT — COGNITIVE AND FUNCTIONAL STATUS - GENERAL
MOBILITY SCORE: 24
DAILY ACTIVITIY SCORE: 24
SUGGESTED CMS G CODE MODIFIER DAILY ACTIVITY: CH
SUGGESTED CMS G CODE MODIFIER MOBILITY: CH

## 2023-01-02 ASSESSMENT — ENCOUNTER SYMPTOMS
EYE DISCHARGE: 0
FOCAL WEAKNESS: 0
SHORTNESS OF BREATH: 0
FLANK PAIN: 0
HEADACHES: 1
MYALGIAS: 0
VOMITING: 1
EYE REDNESS: 0
NAUSEA: 1
EYE REDNESS: 0
FEVER: 0
ABDOMINAL PAIN: 0
WEIGHT LOSS: 0
EYE DISCHARGE: 0
BRUISES/BLEEDS EASILY: 0
COUGH: 0
STRIDOR: 0
MYALGIAS: 0
NERVOUS/ANXIOUS: 0
CHILLS: 1

## 2023-01-02 ASSESSMENT — PATIENT HEALTH QUESTIONNAIRE - PHQ9
1. LITTLE INTEREST OR PLEASURE IN DOING THINGS: NOT AT ALL
2. FEELING DOWN, DEPRESSED, IRRITABLE, OR HOPELESS: NOT AT ALL
SUM OF ALL RESPONSES TO PHQ9 QUESTIONS 1 AND 2: 0

## 2023-01-02 ASSESSMENT — LIFESTYLE VARIABLES
ON A TYPICAL DAY WHEN YOU DRINK ALCOHOL HOW MANY DRINKS DO YOU HAVE: 0
EVER FELT BAD OR GUILTY ABOUT YOUR DRINKING: NO
HOW MANY TIMES IN THE PAST YEAR HAVE YOU HAD 5 OR MORE DRINKS IN A DAY: 0
HAVE YOU EVER FELT YOU SHOULD CUT DOWN ON YOUR DRINKING: NO
TOTAL SCORE: 0
CONSUMPTION TOTAL: NEGATIVE
EVER HAD A DRINK FIRST THING IN THE MORNING TO STEADY YOUR NERVES TO GET RID OF A HANGOVER: NO
ALCOHOL_USE: NO
AVERAGE NUMBER OF DAYS PER WEEK YOU HAVE A DRINK CONTAINING ALCOHOL: 0
TOTAL SCORE: 0
HAVE PEOPLE ANNOYED YOU BY CRITICIZING YOUR DRINKING: NO
TOTAL SCORE: 0

## 2023-01-02 ASSESSMENT — PAIN DESCRIPTION - PAIN TYPE
TYPE: ACUTE PAIN
TYPE: ACUTE PAIN

## 2023-01-02 NOTE — ED TRIAGE NOTES
Pt amb to triage c/o  L foot pain x3d r/t pissible wound on L foot. Pt is NIDDM, pt does not check his blood sugars. Pt reports intermittent n/v/d x past few days

## 2023-01-02 NOTE — PROGRESS NOTES
"Subjective     Jimmy Amaya is a 67 y.o. male who presents with Other (Infection eft leg and foot,vomiting,fever,x3 days)            Months of diabetic foot ulcer had been improving however in the last 3 days he is epigastrically worse pain redness and swelling in right foot.  Now experiencing subjective fever and chills, vomiting and diarrhea.  No recent foreign travel camping or antibiotic use.  No foreign body or trauma.  No other aggravating or alleviating factors.      Review of Systems   Constitutional:  Negative for malaise/fatigue and weight loss.   Eyes:  Negative for discharge and redness.   Musculoskeletal:  Negative for joint pain and myalgias.   Skin:  Negative for itching and rash.   Neurological:  Positive for headaches.            Objective     BP (!) 144/68 (BP Location: Right arm, Patient Position: Sitting, BP Cuff Size: Large adult)   Pulse (!) 133   Temp 37.7 °C (99.8 °F) (Oral)   Resp 18   Ht 1.88 m (6' 2\")   Wt 111 kg (245 lb)   SpO2 94%   BMI 31.46 kg/m²      Physical Exam  Constitutional:       General: He is not in acute distress.     Appearance: He is well-developed.   HENT:      Head: Normocephalic and atraumatic.   Eyes:      Conjunctiva/sclera: Conjunctivae normal.   Cardiovascular:      Rate and Rhythm: Regular rhythm. Tachycardia present.      Heart sounds: Normal heart sounds. No murmur heard.  Pulmonary:      Effort: Pulmonary effort is normal.      Breath sounds: Normal breath sounds. No wheezing.   Musculoskeletal:        Feet:    Skin:     General: Skin is warm and dry.      Findings: No rash.   Neurological:      Mental Status: He is alert.                           Assessment & Plan      Poct influenza negative    Xray per radiology:  1.  There is forefoot swelling over the MTP joint regions with no soft tissue gas.  2.  Questionable small erosion at the base of the 1st proximal phalanx with a soft tissue calcification medial to the 1st metatarsal. Findings suggest " possibility of underlying inflammatory arthropathy such as gout.  3.  No periostitis to suggest osteomyelitis.    1. Nausea and vomiting, unspecified vomiting type  ondansetron (ZOFRAN ODT) dispertab 4 mg    POCT Influenza A/B      2. Left foot pain  DX-FOOT-COMPLETE 3+ LEFT    ibuprofen (MOTRIN) tablet 600 mg      3. Tachycardia        4. Diabetic foot infection (HCC)          Differential diagnosis, natural history, supportive care, and indications for immediate follow-up were discussed.     Jimmy and his sister Charline who is well-known to me for questioning whether this could be influenza.  Given appearance of foot and systemic symptoms with a negative influenza testing I recommend to emergency department for further evaluation and treatment.  Transfer center has been notified.

## 2023-01-03 ENCOUNTER — APPOINTMENT (OUTPATIENT)
Dept: RADIOLOGY | Facility: MEDICAL CENTER | Age: 68
DRG: 872 | End: 2023-01-03
Attending: HOSPITALIST
Payer: MEDICARE

## 2023-01-03 PROBLEM — E87.1 HYPONATREMIA: Status: ACTIVE | Noted: 2023-01-03

## 2023-01-03 LAB
ALBUMIN SERPL BCP-MCNC: 3.1 G/DL (ref 3.2–4.9)
ALBUMIN/GLOB SERPL: 1 G/DL
ALP SERPL-CCNC: 84 U/L (ref 30–99)
ALT SERPL-CCNC: 13 U/L (ref 2–50)
ANION GAP SERPL CALC-SCNC: 10 MMOL/L (ref 7–16)
AST SERPL-CCNC: 13 U/L (ref 12–45)
BILIRUB SERPL-MCNC: 1.5 MG/DL (ref 0.1–1.5)
BUN SERPL-MCNC: 15 MG/DL (ref 8–22)
CALCIUM ALBUM COR SERPL-MCNC: 9.1 MG/DL (ref 8.5–10.5)
CALCIUM SERPL-MCNC: 8.4 MG/DL (ref 8.4–10.2)
CHLORIDE SERPL-SCNC: 101 MMOL/L (ref 96–112)
CO2 SERPL-SCNC: 21 MMOL/L (ref 20–33)
CREAT SERPL-MCNC: 0.72 MG/DL (ref 0.5–1.4)
EKG IMPRESSION: NORMAL
ERYTHROCYTE [DISTWIDTH] IN BLOOD BY AUTOMATED COUNT: 43.3 FL (ref 35.9–50)
EST. AVERAGE GLUCOSE BLD GHB EST-MCNC: 220 MG/DL
GFR SERPLBLD CREATININE-BSD FMLA CKD-EPI: 100 ML/MIN/1.73 M 2
GLOBULIN SER CALC-MCNC: 3.1 G/DL (ref 1.9–3.5)
GLUCOSE BLD STRIP.AUTO-MCNC: 189 MG/DL (ref 65–99)
GLUCOSE BLD STRIP.AUTO-MCNC: 204 MG/DL (ref 65–99)
GLUCOSE BLD STRIP.AUTO-MCNC: 205 MG/DL (ref 65–99)
GLUCOSE BLD STRIP.AUTO-MCNC: 212 MG/DL (ref 65–99)
GLUCOSE SERPL-MCNC: 224 MG/DL (ref 65–99)
HBA1C MFR BLD: 9.3 % (ref 4–5.6)
HCT VFR BLD AUTO: 41.5 % (ref 42–52)
HGB BLD-MCNC: 13.6 G/DL (ref 14–18)
LACTATE SERPL-SCNC: 1.4 MMOL/L (ref 0.5–2)
LACTATE SERPL-SCNC: 1.4 MMOL/L (ref 0.5–2)
MAGNESIUM SERPL-MCNC: 1.8 MG/DL (ref 1.5–2.5)
MCH RBC QN AUTO: 27.9 PG (ref 27–33)
MCHC RBC AUTO-ENTMCNC: 32.8 G/DL (ref 33.7–35.3)
MCV RBC AUTO: 85 FL (ref 81.4–97.8)
PLATELET # BLD AUTO: 200 K/UL (ref 164–446)
PMV BLD AUTO: 10.4 FL (ref 9–12.9)
POTASSIUM SERPL-SCNC: 3.8 MMOL/L (ref 3.6–5.5)
PROT SERPL-MCNC: 6.2 G/DL (ref 6–8.2)
RBC # BLD AUTO: 4.88 M/UL (ref 4.7–6.1)
SCCMEC + MECA PNL NOSE NAA+PROBE: NEGATIVE
SODIUM SERPL-SCNC: 132 MMOL/L (ref 135–145)
WBC # BLD AUTO: 14 K/UL (ref 4.8–10.8)

## 2023-01-03 PROCEDURE — 94760 N-INVAS EAR/PLS OXIMETRY 1: CPT

## 2023-01-03 PROCEDURE — 83605 ASSAY OF LACTIC ACID: CPT

## 2023-01-03 PROCEDURE — 700105 HCHG RX REV CODE 258: Performed by: HOSPITALIST

## 2023-01-03 PROCEDURE — 36415 COLL VENOUS BLD VENIPUNCTURE: CPT

## 2023-01-03 PROCEDURE — 87070 CULTURE OTHR SPECIMN AEROBIC: CPT

## 2023-01-03 PROCEDURE — 93010 ELECTROCARDIOGRAM REPORT: CPT | Performed by: INTERNAL MEDICINE

## 2023-01-03 PROCEDURE — 770001 HCHG ROOM/CARE - MED/SURG/GYN PRIV*

## 2023-01-03 PROCEDURE — 85027 COMPLETE CBC AUTOMATED: CPT

## 2023-01-03 PROCEDURE — 82962 GLUCOSE BLOOD TEST: CPT | Mod: 91

## 2023-01-03 PROCEDURE — 93005 ELECTROCARDIOGRAM TRACING: CPT | Performed by: HOSPITALIST

## 2023-01-03 PROCEDURE — 87076 CULTURE ANAEROBE IDENT EACH: CPT | Mod: 91

## 2023-01-03 PROCEDURE — 87077 CULTURE AEROBIC IDENTIFY: CPT

## 2023-01-03 PROCEDURE — 700111 HCHG RX REV CODE 636 W/ 250 OVERRIDE (IP): Performed by: HOSPITALIST

## 2023-01-03 PROCEDURE — A9270 NON-COVERED ITEM OR SERVICE: HCPCS | Performed by: HOSPITALIST

## 2023-01-03 PROCEDURE — 87186 SC STD MICRODIL/AGAR DIL: CPT

## 2023-01-03 PROCEDURE — 83735 ASSAY OF MAGNESIUM: CPT

## 2023-01-03 PROCEDURE — 700102 HCHG RX REV CODE 250 W/ 637 OVERRIDE(OP): Performed by: HOSPITALIST

## 2023-01-03 PROCEDURE — 99233 SBSQ HOSP IP/OBS HIGH 50: CPT | Performed by: FAMILY MEDICINE

## 2023-01-03 PROCEDURE — 87205 SMEAR GRAM STAIN: CPT

## 2023-01-03 PROCEDURE — 87147 CULTURE TYPE IMMUNOLOGIC: CPT

## 2023-01-03 PROCEDURE — 80053 COMPREHEN METABOLIC PANEL: CPT

## 2023-01-03 PROCEDURE — 73718 MRI LOWER EXTREMITY W/O DYE: CPT | Mod: LT

## 2023-01-03 RX ORDER — SODIUM CHLORIDE 9 MG/ML
1000 INJECTION, SOLUTION INTRAVENOUS ONCE
Status: COMPLETED | OUTPATIENT
Start: 2023-01-03 | End: 2023-01-03

## 2023-01-03 RX ORDER — INSULIN LISPRO 100 [IU]/ML
1-6 INJECTION, SOLUTION INTRAVENOUS; SUBCUTANEOUS
Status: DISCONTINUED | OUTPATIENT
Start: 2023-01-03 | End: 2023-01-04 | Stop reason: HOSPADM

## 2023-01-03 RX ADMIN — AMPICILLIN AND SULBACTAM 3 G: 1; 2 INJECTION, POWDER, FOR SOLUTION INTRAMUSCULAR; INTRAVENOUS at 18:02

## 2023-01-03 RX ADMIN — INSULIN GLARGINE-YFGN 5 UNITS: 100 INJECTION, SOLUTION SUBCUTANEOUS at 06:22

## 2023-01-03 RX ADMIN — HYDROMORPHONE HYDROCHLORIDE 0.25 MG: 1 INJECTION, SOLUTION INTRAMUSCULAR; INTRAVENOUS; SUBCUTANEOUS at 04:51

## 2023-01-03 RX ADMIN — HYDROMORPHONE HYDROCHLORIDE 0.25 MG: 1 INJECTION, SOLUTION INTRAMUSCULAR; INTRAVENOUS; SUBCUTANEOUS at 12:11

## 2023-01-03 RX ADMIN — VANCOMYCIN HYDROCHLORIDE 1250 MG: 1 INJECTION, POWDER, LYOPHILIZED, FOR SOLUTION INTRAVENOUS at 06:36

## 2023-01-03 RX ADMIN — ENOXAPARIN SODIUM 40 MG: 40 INJECTION SUBCUTANEOUS at 17:31

## 2023-01-03 RX ADMIN — INSULIN LISPRO 2 UNITS: 100 INJECTION, SOLUTION INTRAVENOUS; SUBCUTANEOUS at 06:22

## 2023-01-03 RX ADMIN — AMPICILLIN AND SULBACTAM 3 G: 1; 2 INJECTION, POWDER, FOR SOLUTION INTRAMUSCULAR; INTRAVENOUS at 05:51

## 2023-01-03 RX ADMIN — OXYCODONE 2.5 MG: 5 TABLET ORAL at 20:11

## 2023-01-03 RX ADMIN — INSULIN LISPRO 2 UNITS: 100 INJECTION, SOLUTION INTRAVENOUS; SUBCUTANEOUS at 20:18

## 2023-01-03 RX ADMIN — INSULIN LISPRO 1 UNITS: 100 INJECTION, SOLUTION INTRAVENOUS; SUBCUTANEOUS at 16:44

## 2023-01-03 RX ADMIN — ACETAMINOPHEN 650 MG: 325 TABLET, FILM COATED ORAL at 15:42

## 2023-01-03 RX ADMIN — VANCOMYCIN HYDROCHLORIDE 1250 MG: 1 INJECTION, POWDER, LYOPHILIZED, FOR SOLUTION INTRAVENOUS at 18:37

## 2023-01-03 RX ADMIN — SODIUM CHLORIDE 1000 ML: 9 INJECTION, SOLUTION INTRAVENOUS at 04:39

## 2023-01-03 RX ADMIN — INSULIN LISPRO 2 UNITS: 100 INJECTION, SOLUTION INTRAVENOUS; SUBCUTANEOUS at 11:27

## 2023-01-03 RX ADMIN — AMPICILLIN AND SULBACTAM 3 G: 1; 2 INJECTION, POWDER, FOR SOLUTION INTRAMUSCULAR; INTRAVENOUS at 11:33

## 2023-01-03 ASSESSMENT — ENCOUNTER SYMPTOMS
CHILLS: 1
FEVER: 1
SHORTNESS OF BREATH: 0
VOMITING: 0
NAUSEA: 0
ABDOMINAL PAIN: 0
COUGH: 0

## 2023-01-03 ASSESSMENT — PAIN DESCRIPTION - PAIN TYPE
TYPE: ACUTE PAIN

## 2023-01-03 ASSESSMENT — PATIENT HEALTH QUESTIONNAIRE - PHQ9
SUM OF ALL RESPONSES TO PHQ9 QUESTIONS 1 AND 2: 0
1. LITTLE INTEREST OR PLEASURE IN DOING THINGS: NOT AT ALL
2. FEELING DOWN, DEPRESSED, IRRITABLE, OR HOPELESS: NOT AT ALL

## 2023-01-03 ASSESSMENT — FIBROSIS 4 INDEX: FIB4 SCORE: 1.19

## 2023-01-03 NOTE — ED PROVIDER NOTES
"ED Provider Note        CHIEF COMPLAINT  Chief Complaint   Patient presents with    Foot Pain       EXTERNAL RECORDS REVIEWED  Patient was seen at urgent care prior to this.  He had an x-ray showing soft tissues swelling over the forefoot and possible small erosion at the base of the first proximal phalanx.  He had a influenza test that was negative.  Last A1c 10.9    HPI  LIMITATION TO HISTORY   Select: : None  OUTSIDE HISTORIAN(S):  Select: Family sister    Jimmy Amaya is a 67 y.o. male who presents to the emergency department with right foot pain.  Patient has a history of diabetes which is poorly controlled.  He has had an ulceration to the bottom of his right foot.  He states that over the last 3 to 4 days he has had increasing pain and redness to the front of the foot.  He is also had flulike symptoms including fevers, chills, nausea and vomiting.  No history of trauma.    REVIEW OF SYSTEMS  See HPI for further details. All other systems are negative.     PAST MEDICAL HISTORY   has a past medical history of Diabetes (HCC).    SURGICAL HISTORY   has a past surgical history that includes dental extraction(s).    FAMILY HISTORY  Family History   Problem Relation Age of Onset    Cancer Father        SOCIAL HISTORY  Social History     Tobacco Use    Smoking status: Never    Smokeless tobacco: Never   Vaping Use    Vaping Use: Never used   Substance and Sexual Activity    Alcohol use: Not Currently    Drug use: Never    Sexual activity: Not Currently       CURRENT MEDICATIONS  Home Medications       Reviewed by Ludwin Burch R.N. (Registered Nurse) on 01/02/23 at 1946  Med List Status: Complete     Medication Last Dose Status        Patient Gerard Taking any Medications                           ALLERGIES  No Known Allergies    PHYSICAL EXAM  VITAL SIGNS: BP (!) 152/86   Pulse (!) 103   Temp 36.9 °C (98.5 °F) (Oral)   Resp 18   Ht 1.88 m (6' 2\")   Wt 116 kg (255 lb 11.7 oz)   SpO2 94%   BMI 32.83 " kg/m²    Constitutional: Nontoxic appearing. Alert in no apparent distress.  HENT: Normocephalic, Atraumatic. Bilateral external ears normal. Nose normal.  Moist mucous membranes.  Oropharynx clear.  Eyes: Pupils are equal and reactive. Conjunctiva normal.   Neck: Supple, full range of motion  Heart: Regular rate and rhythm.  No murmurs.    Lungs: No respiratory distress, normal work of breathing. Lungs clear to auscultation bilaterally.  Abdomen Soft, no distention.  No tenderness to palpation.  Musculoskeletal: Atraumatic. No obvious deformities noted.  No lower extremity edema.  Scabbed over ulceration to the sole of the left foot just at the base of the first toe.  Erythema and swelling extending onto the dorsum of the foot.  Good range of motion of the foot without significant pain.  Skin: Warm, Dry.  No erythema, No rash.   Neurologic: Alert and oriented x3. Moving all extremities spontaneously without focal deficits.  Psychiatric: Affect normal, Mood normal, Appears appropriate and not intoxicated.      DIAGNOSTIC STUDIES / PROCEDURES    LABS  Labs Reviewed   CBC WITH DIFFERENTIAL - Abnormal; Notable for the following components:       Result Value    WBC 18.9 (*)     MCHC 33.0 (*)     Neutrophils-Polys 85.20 (*)     Lymphocytes 5.50 (*)     Neutrophils (Absolute) 16.10 (*)     Monos (Absolute) 1.52 (*)     Immature Granulocytes (abs) 0.17 (*)     All other components within normal limits   BASIC METABOLIC PANEL - Abnormal; Notable for the following components:    Sodium 130 (*)     Glucose 257 (*)     All other components within normal limits   CRP QUANTITIVE (NON-CARDIAC) - Abnormal; Notable for the following components:    Stat C-Reactive Protein 22.64 (*)     All other components within normal limits   ESTIMATED GFR   LACTIC ACID   SED RATE   LACTIC ACID   COV-2, FLU A/B, AND RSV BY PCR (CEPHerBabyShowerID)   HEMOGLOBIN A1C   LACTIC ACID   BLOOD CULTURE    Narrative:     1 of 2 for Blood Culture x 2 sites order. Per  "Hospital  Policy: Only change Specimen Src: to \"Line\" if specified by  physician order.   BLOOD CULTURE    Narrative:     2 of 2 blood culture x2  Sites order. Per Hospital Policy:  Only change Specimen Src: to \"Line\" if specified by physician  order.   MRSA BY PCR (AMP)   LACTIC ACID   LACTIC ACID         RADIOLOGY  I have independently interpreted the diagnostic imaging associated with this visit and am waiting the final reading from the radiologist.   Reviewed prior XR performed at urgent care.      COURSE & MEDICAL DECISION MAKING  Pertinent Labs & Imaging studies reviewed. (See chart for details)    ED Observation Status? No; Patient does not meet criteria for ED Observation.     INITIAL ASSESSMENT AND PLAN  Patient with history of poorly controlled diabetes who presents with increasing pain and redness surrounding a chronic ulcer to his left foot over the last few days.  He is afebrile, tachycardic on arrival with otherwise reassuring vital signs.  His exam is concerning for developing cellulitis and possible deeper infection.  No concern for septic arthritis or neurovascular compromise.  His labs are concerning for sepsis with a leukocytosis of 18 and significantly elevated CRP.  Lactate is normal without concern for severe sepsis.  No other electrolyte abnormality or renal dysfunction.  X-ray from urgent care prior to this shows possibly bony erosion at the first MTP joint.  This is also right where his ulcer is therefore he likely needs an MRI to rule out osteomyelitis.  We will plan to admit on IV antibiotics in the meantime.    Diagnostic tests and prescription drugs considered including, but not limited to: MRI will be performed as an inpatient.    I have discussed management of the patient with the following physicians and PINA's:  Dr. Montano, hospitalist who accepts admission of the patient.      Upon reassessment, patient is resting comfortably with stable vital signs.  No new complaints at this " time.  Discussed results with patient and/or family as well as plan of care for admission. Patient and family member are agreeable at this time.    Disposition:  Admit medicine, guarded condition    FINAL IMPRESSION  (E08.621,  L97.421) Diabetic ulcer of left midfoot associated with diabetes mellitus due to underlying condition, limited to breakdown of skin (HCC)  (L08.9) Left foot infection         Electronically signed by: Chaparrita Almonte M.D., 1/2/2023 4:41 PM

## 2023-01-03 NOTE — ASSESSMENT & PLAN NOTE
- Started on Unasyn and vancomycin in the emergency room continue for now follow on cultures and sensitivities  MRI and plain films without evidence of osteo or underlying abscess. However, pt with some purulence expressed by myself, tense erythema under the arch of his foot - may benefit from I&D. Will contact Ortho and make pt NPO after midnight.   - MRSA by culture, discussed with [facility], changing Vanc to Zyvox to achieve therapeutic levels sooner

## 2023-01-03 NOTE — PROGRESS NOTES
4 Eyes Skin Assessment Completed by Ma, RN and Phylicia RN.    Head WDL  Ears WDL  Nose WDL  Mouth WDL  Neck WDL  Breast/Chest WDL  Shoulder Blades WDL  Spine WDL  (R) Arm/Elbow/Hand WDL  (L) Arm/Elbow/Hand WDL  Abdomen WDL  Groin WDL  Scrotum/Coccyx/Buttocks WDL  (R) Leg WDL  (L) Leg WDL  (R) Heel/Foot/Toe Scab  (L) Heel/Foot/Toe Redness and Swelling, small ulcer at bottom of big toe          Devices In Places Blood Pressure Cuff and Pulse Ox      Interventions In Place Pillows and Pressure Redistribution Mattress    Possible Skin Injury No    Pictures Uploaded Into Epic Yes  Wound Consult Placed N/A  RN Wound Prevention Protocol Ordered No

## 2023-01-03 NOTE — PROGRESS NOTES
Pt arrived via wheelchair, admitted to room 110 from ED. Pt is A&Ox4, on RA, complains L foot pain level of 10/10. Pt oriented to room and call light. POC reviewed with pt and family. Fall precautions in place, bed at lowest position. Hourly rounding in place.      0035: Pt HR sustaining at 118-122 at rest, MD Mayer paged. Order for stat EKG and continuous remote tele monitoring received.    0426: Pt HR sustaining at 120-123 for more than 10 minutes per monitor tech. MD Mayer notified. Order for 1 L bolus NS received.

## 2023-01-03 NOTE — DISCHARGE PLANNING
Case Management Discharge Planning    Admission Date: 1/2/2023  GMLOS: 3.5  ALOS: 1    6-Clicks ADL Score: 24  6-Clicks Mobility Score: 24      Anticipated Discharge Dispo: Discharge Disposition: Discharged to home/self care (01)    DME Needed: No    Action(s) Taken: Updated Provider/Nurse on Discharge Plan    No anticipated DC needs. RN CM will continue to follow.     Escalations Completed: None    Medically Clear: No    Next Steps: Medical clearance    Barriers to Discharge: Medical clearance             Patient seen and examined at bedside  c/o stable mild abdominal discomfort  Case discussed with medical team    HPI:  89 yo M, poor historian, charts reviewed, with PMHx BPH, Cataracts, Diverticulosis, Anemia 2/2 GI Bleed with previous PRBC transfusions, who p/w 2 days genearlized weakness, urinary retention and confusion.  Upon arrival to the ED, pt with scleral icterus and labs significant for transaminitis and TBili > 20 (15 Mar 2019 06:59)      PAST MEDICAL & SURGICAL HISTORY:  Chronic kidney disease (CKD), stage IV (severe)  Benign prostatic hypertrophy  S/P cataract surgery, left: 3 yrs ago      No Known Allergies       MEDICATIONS  (STANDING):  ampicillin/sulbactam  IVPB      ampicillin/sulbactam  IVPB 3 Gram(s) IV Intermittent every 12 hours  enoxaparin Injectable 30 milliGRAM(s) SubCutaneous daily  ergocalciferol 68061 Unit(s) Oral every week  pantoprazole  Injectable 40 milliGRAM(s) IV Push daily  tamsulosin Oral Tab/Cap - Peds 0.4 milliGRAM(s) Oral at bedtime    MEDICATIONS  (PRN):      REVIEW OF SYSTEMS:  CONSTITUTIONAL: (+) malaise.   EYES: No acute change in vision   ENT:  No tinnitus  NECK: No stiffness  RESPIRATORY: No hemoptysis  CARDIOVASCULAR: No chest pain, palpitations, syncope  GASTROINTESTINAL: abd discomfort. No hematemesis, diarrhea, melena, or hematochezia.  GENITOURINARY: No hematuria  NEUROLOGICAL: No headaches  LYMPH Nodes: No enlarged glands  ENDOCRINE: No heat or cold intolerance	    T(C): 36.1 (19 @ 05:56), Max: 36.2 (19 @ 14:53)  HR: 85 (19 @ 05:56) (77 - 91)  BP: 114/61 (19 @ 05:56) (114/61 - 141/70)  RR: 18 (19 @ 05:56) (18 - 20)  SpO2: 100% (19 @ 05:56) (99% - 100%)    PHYSICAL EXAMINATION:   Constitutional: ill appearance., NAD  HEENT: bitemp wasting. scleral icterus. AT  Neck:  Supple  Respiratory:  stable rhonchi. Adequate airflow b/l. Not using accessory muscles of respiration.  Cardiovascular:  S1 & S2 intact, no R/G, 2+ radial pulses b/l  Gastrointestinal: Soft, NT, ND, normoactive b.s., no organomegaly/RT/rigidity  Extremities: poor skin turgor. WWP  Neurological:  Alert and awake. mild intermittent confusion.  No acute focal motor deficits. Crude sensation intact.     Labs and imaging reviewed    LABS:                        8.7    6.52  )-----------( 120      ( 22 Mar 2019 06:00 )             24.8     -    146<H>  |  116<H>  |  20  ----------------------------<  99  3.5   |  20<L>  |  1.89<H>    Ca    8.6      21 Mar 2019 07:30  Phos  2.4       Mg     1.6         TPro  6.2  /  Alb  2.3<L>  /  TBili  21.8<H>  /  DBili  x   /  AST  385<H>  /  ALT  240<H>  /  AlkPhos  255<H>          PT/INR - ( 22 Mar 2019 06:00 )   PT: 21.6 SEC;   INR: 1.86          PTT - ( 22 Mar 2019 06:00 )  PTT:62.8 SEC  Urinalysis Basic - ( 20 Mar 2019 13:25 )    Color: ELSA / Appearance: TURBID / S.020 / pH: 6.0  Gluc: 100 / Ketone: NEGATIVE  / Bili: LARGE / Urobili: 4.0   Blood: MODERATE / Protein: 100 / Nitrite: NEGATIVE   Leuk Esterase: LARGE / RBC: 3-5 / WBC >50   Sq Epi: x / Non Sq Epi: OCC / Bacteria: MANY      CAPILLARY BLOOD GLUCOSE            LIVER FUNCTIONS - ( 21 Mar 2019 07:30 )  Alb: 2.3 g/dL / Pro: 6.2 g/dL / ALK PHOS: 255 u/L / ALT: 240 u/L / AST: 385 u/L / GGT: x               RADIOLOGY & ADDITIONAL STUDIES:

## 2023-01-03 NOTE — ED NOTES
Pt updated on plan of care and bed status, verbalized understanding and denied questions. Alert, oriented. Made aware dietary is working on dinner tray - will be sent to pt's room assignment.

## 2023-01-03 NOTE — ASSESSMENT & PLAN NOTE
This is Sepsis Present on admission  SIRS criteria identified on my evaluation include: Tachycardia, with heart rate greater than 90 BPM and Leukocytosis, with WBC greater than 12,000  Source is diabetic foot infection  Sepsis protocol initiated  Fluid resuscitation ordered per protocol  Crystalloid Fluid Administration: Fluid resuscitation ordered per standard protocol - 30 mL/kg per current or ideal body weight  IV antibiotics as appropriate for source of sepsis  Reassessment: I have reassessed the patient's hemodynamic status    MRSA on culture of foot, continue Zyvox and Unasyn, MRI without abscess or osteo. However, given the severity of cellulitis and wound, will ask Ortho to weigh in on possible debridement.

## 2023-01-03 NOTE — CARE PLAN
The patient is Watcher - Medium risk of patient condition declining or worsening    Shift Goals  Clinical Goals: Pt will rate his pain at his comfort level of 6/10 or below; Monitor pt's HR  Patient Goals: Sleep and rest  Family Goals: NA    Progress made toward(s) clinical / shift goals:  Pt's pain remained tolerable with given PRN pain med interventions; Pt's HR sustaining at 118-120's during the shift, MD hospitalist informed; order for stat EKG and continuous remote tele monitoring received. 1 L NS bolus given at 5AM per MD order. BG monitored and maintained within satisfactory range with insulin therapy. Progressing in other goals.      Problem: Pain - Standard  Goal: Alleviation of pain or a reduction in pain to the patient’s comfort goal  Outcome: Progressing     Problem: Knowledge Deficit - Standard  Goal: Patient and family/care givers will demonstrate understanding of plan of care, disease process/condition, diagnostic tests and medications  Outcome: Progressing     Problem: Hemodynamics  Goal: Patient's hemodynamics, fluid balance and neurologic status will be stable or improve  Outcome: Progressing     Problem: Physical Regulation  Goal: Diagnostic test results will improve  Outcome: Progressing     Problem: Diabetes Management  Goal: Patient will achieve and maintain glucose in satisfactory range  Outcome: Progressing     Problem: Skin Integrity - Diabetes  Goal: Patient's skin on legs and feet will remain intact while hospitalized  Outcome: Progressing     Problem: Diabetic Ulcer  Goal: Early identification of diabetic foot wound and initiation of appropriate interventions  Outcome: Progressing       Patient is not progressing towards the following goals: NA

## 2023-01-03 NOTE — H&P
Hospital Medicine History & Physical Note    Date of Service  1/2/2023    Primary Care Physician  Channing Garcia M.D.    Consultants  None     Code Status  Full Code    Chief Complaint  Chief Complaint   Patient presents with    Foot Pain     History of Presenting Illness  Jimmy Amaya is a 67 y.o. male with a past medical history of poorly controlled diabetes who presented 1/2/2023 with progressively worsening foot pain redness and swelling.  Patient reports that he has an ulcer of the left foot that has not been healing well over the past 1 month.  However, over the past 4 days he has been having progressively worsening pain redness swelling from the ulcer.  The pain was initially mild but now is severe rated as 8/10.  The pain is worse with weightbearing and with walking.  The pain does not radiate to other places.  No relieving factors other than pain medications.  He also reports generalized weakness and chills but denies having fevers.      I discussed the plan of care with emergency department physician, and the patient    Review of Systems  Review of Systems   Constitutional:  Positive for chills and malaise/fatigue. Negative for fever.   Eyes:  Negative for discharge and redness.   Respiratory:  Negative for cough, shortness of breath and stridor.    Cardiovascular:  Negative for chest pain and leg swelling.   Gastrointestinal:  Positive for nausea and vomiting. Negative for abdominal pain.   Genitourinary:  Negative for flank pain.   Musculoskeletal:  Negative for myalgias.        Left foot pain, swelling and redness   Skin: Negative.    Neurological:  Negative for focal weakness.   Endo/Heme/Allergies:  Does not bruise/bleed easily.   Psychiatric/Behavioral:  The patient is not nervous/anxious.      Past Medical History   has a past medical history of Diabetes (HCC).    Surgical History   has a past surgical history that includes dental extraction(s).     Family History  family history includes Cancer  in his father.      Social History   reports that he has never smoked. He has never used smokeless tobacco. He reports that he does not currently use alcohol. He reports that he does not use drugs.    Allergies  No Known Allergies    Medications  None     Physical Exam  Temp:  [36.6 °C (97.9 °F)-37.7 °C (99.8 °F)] 36.9 °C (98.5 °F)  Pulse:  [] 103  Resp:  [17-18] 18  BP: (108-152)/(68-86) 152/86  SpO2:  [94 %-97 %] 94 %  Blood Pressure : 127/74   Temperature: 36.6 °C (97.9 °F)   Pulse: 94   Respiration: 17   Pulse Oximetry: 96 %     Physical Exam  Constitutional:       General: He is not in acute distress.     Appearance: He is not ill-appearing or diaphoretic.   HENT:      Head: Atraumatic.      Right Ear: External ear normal.      Left Ear: External ear normal.      Nose: No congestion or rhinorrhea.      Mouth/Throat:      Mouth: Mucous membranes are dry.   Eyes:      General: No scleral icterus.        Right eye: No discharge.         Left eye: No discharge.      Pupils: Pupils are equal, round, and reactive to light.   Cardiovascular:      Rate and Rhythm: Regular rhythm. Tachycardia present.   Pulmonary:      Effort: Pulmonary effort is normal.   Abdominal:      General: There is no distension.   Musculoskeletal:         General: Swelling, tenderness and signs of injury present.      Cervical back: Neck supple. No rigidity. No muscular tenderness.      Right lower leg: No edema.      Left lower leg: No edema.      Comments: Small round left foot ulcer surrounded by erythema, edema and tenderness   Skin:     General: Skin is dry.      Capillary Refill: Capillary refill takes 2 to 3 seconds.      Coloration: Skin is not jaundiced or pale.   Neurological:      Mental Status: He is alert and oriented to person, place, and time.      Coordination: Coordination normal.   Psychiatric:         Mood and Affect: Mood normal.         Behavior: Behavior normal.     Laboratory:  Recent Labs     01/02/23  1501   WBC  18.9*   RBC 5.52   HEMOGLOBIN 15.5   HEMATOCRIT 47.0   MCV 85.1   MCH 28.1   MCHC 33.0*   RDW 43.8   PLATELETCT 221   MPV 10.2     Recent Labs     01/02/23  1501   SODIUM 130*   POTASSIUM 4.3   CHLORIDE 97   CO2 22   GLUCOSE 257*   BUN 19   CREATININE 0.98   CALCIUM 9.1     Recent Labs     01/02/23  1501   GLUCOSE 257*         No results for input(s): NTPROBNP in the last 72 hours.      No results for input(s): TROPONINT in the last 72 hours.    Imaging:  MR-FOOT-W/O LEFT    (Results Pending)     Assessment/Plan:  Justification for Admission Status  I anticipate this patient will require at least two midnights for appropriate medical management, necessitating inpatient admission because patient has sepsis secondary to cellulitis, diabetic foot infection, will require intravenous antibiotics, MRI to rule out osteomyelitis.    * Diabetic foot infection (HCC)- (present on admission)  Assessment & Plan  Started on Unasyn and vancomycin in the emergency room continue for now follow on cultures and sensitivities  Has elevated inflammatory markers clinically concerning for osteomyelitis  We will check MRI to rule out osteomyelitis    Sepsis (HCC)- (present on admission)  Assessment & Plan  This is Sepsis Present on admission  SIRS criteria identified on my evaluation include: Tachycardia, with heart rate greater than 90 BPM and Leukocytosis, with WBC greater than 12,000  Source is diabetic foot infection  Sepsis protocol initiated  Fluid resuscitation ordered per protocol  Crystalloid Fluid Administration: Fluid resuscitation ordered per standard protocol - 30 mL/kg per current or ideal body weight  IV antibiotics as appropriate for source of sepsis  Reassessment: I have reassessed the patient's hemodynamic status     Type 2 diabetes mellitus with circulatory disorder, without long-term current use of insulin (HCC)- (present on admission)  Assessment & Plan  Poorly controlled, with hyperglycemia  Last glycated hemoglobin  was 10.9%   I will start long & short acting insulin for now  Accu-Checks, hypoglycemia protocol  Adjust according to blood sugars trend      VTE prophylaxis: SCDs/TEDs and enoxaparin ppx

## 2023-01-03 NOTE — PROGRESS NOTES
Bedside report received from night RN. Assumed care of patient. Alert and oriented, wakes easily to voice. Daily plan of care discussed. Pt denies pain at this time. Hourly rounding in place.

## 2023-01-03 NOTE — ASSESSMENT & PLAN NOTE
"Poorly controlled, with hyperglycemia  Last glycated hemoglobin was 10.9%   10u Lantus at night  Accu-Checks, hypoglycemia protocol  Adjust according to blood sugars trend  A1c 9.3.. Pt states he stopped taking his metformin \"because I'm stupid\".   "

## 2023-01-03 NOTE — PROGRESS NOTES
OVERNIGHT HOSPITALIST:    Paged by nursing Staff.  Patient with tachycardia.  EKG was done showing sinus tachycardia 118 bpm.  Patient is not having chest pain.  He was admitted earlier in the day with diabetic foot infection.  Given that he is sustaining heart rate above 110 I added remote cardiac monitoring.

## 2023-01-03 NOTE — PROGRESS NOTES
Hospital Medicine Daily Progress Note    Date of Service  1/3/2023    Chief Complaint  Jimmy Amaya is a 67 y.o. male admitted 1/2/2023 with foot pain    Hospital Course  Jimmy Amaya is a 67 y.o. male with a past medical history of poorly controlled diabetes who presented 1/2/2023 with progressively worsening foot pain redness and swelling.  Patient reports that he has an ulcer of the left foot that has not been healing well over the past 1 month.  However, over the past 4 days he has been having progressively worsening pain redness swelling from the ulcer.  The pain was initially mild but now is severe rated as 8/10.  The pain is worse with weightbearing and with walking.  The pain does not radiate to other places.  No relieving factors other than pain medications.  He also reports generalized weakness and chills but denies having fevers.     Interval Problem Update  1/3 - Pt tachycardic overnight, received a bolus, remains with mild tachycardia this am.  Borderline temp at 99.8 this morning, on room air. White count down with Unasyn and Vanc, what appears to be dilutional anemia, and hyponatremia improved to 132 with IVFs.  this am with 5u BID of Lantus, will change to 10u nightly. Pt states he is supposed to be on Metformin but had not been taking it. Blood cultures are NTD, plain film of foot with swelling over the MTP joint, possible erosion at the base of the 1st proximal phalanx suggestive of possibly gout - no evidence of osteo. MRI without osteo or abscess - will check wound culture, continue antibiotics and have Wound Care see pt.     I have discussed this patient's plan of care and discharge plan at IDT rounds today with Case Management, Nursing, Nursing leadership, and other members of the IDT team.    Consultants/Specialty  none    Code Status  Full Code    Disposition  Patient is not medically cleared for discharge.   Anticipate discharge to to home with close outpatient follow-up.  I have  placed the appropriate orders for post-discharge needs.    Review of Systems  Review of Systems   Constitutional:  Positive for chills and fever.   Respiratory:  Negative for cough and shortness of breath.    Cardiovascular:  Negative for chest pain and leg swelling.   Gastrointestinal:  Negative for abdominal pain, nausea and vomiting.   Musculoskeletal:  Positive for joint pain (foot).   All other systems reviewed and are negative.     Physical Exam  Temp:  [36.6 °C (97.9 °F)-37.7 °C (99.8 °F)] 37.7 °C (99.8 °F)  Pulse:  [] 110  Resp:  [16-18] 16  BP: (108-152)/(54-86) 108/54  SpO2:  [90 %-97 %] 90 %    Physical Exam    Fluids    Intake/Output Summary (Last 24 hours) at 1/3/2023 0928  Last data filed at 1/3/2023 0753  Gross per 24 hour   Intake 1100 ml   Output 160 ml   Net 940 ml       Laboratory  Recent Labs     01/02/23  1501 01/03/23  0255   WBC 18.9* 14.0*   RBC 5.52 4.88   HEMOGLOBIN 15.5 13.6*   HEMATOCRIT 47.0 41.5*   MCV 85.1 85.0   MCH 28.1 27.9   MCHC 33.0* 32.8*   RDW 43.8 43.3   PLATELETCT 221 200   MPV 10.2 10.4     Recent Labs     01/02/23  1501 01/03/23  0255   SODIUM 130* 132*   POTASSIUM 4.3 3.8   CHLORIDE 97 101   CO2 22 21   GLUCOSE 257* 224*   BUN 19 15   CREATININE 0.98 0.72   CALCIUM 9.1 8.4                   Imaging  MR-FOOT-W/O LEFT   Final Result      1.  No evidence of osteomyelitis.      2.  Cellulitis and soft tissue ulceration.           Assessment/Plan  * Diabetic foot infection (HCC)- (present on admission)  Assessment & Plan  Started on Unasyn and vancomycin in the emergency room continue for now follow on cultures and sensitivities  MRI and plain films without evidence of osteo or underlying abscess. Continue antibiotics, wound care to see pt.     Hyponatremia  Assessment & Plan  - Improving with IVFs     Type 2 diabetes mellitus with circulatory disorder, without long-term current use of insulin (HCC)- (present on admission)  Assessment & Plan  Poorly controlled, with  "hyperglycemia  Last glycated hemoglobin was 10.9%   10u Lantus at night  Accu-Checks, hypoglycemia protocol  Adjust according to blood sugars trend  - Check A1c. Pt states he stopped taking his metformin \"because I'm stupid\".     Sepsis (HCC)- (present on admission)  Assessment & Plan  This is Sepsis Present on admission  SIRS criteria identified on my evaluation include: Tachycardia, with heart rate greater than 90 BPM and Leukocytosis, with WBC greater than 12,000  Source is diabetic foot infection  Sepsis protocol initiated  Fluid resuscitation ordered per protocol  Crystalloid Fluid Administration: Fluid resuscitation ordered per standard protocol - 30 mL/kg per current or ideal body weight  IV antibiotics as appropriate for source of sepsis  Reassessment: I have reassessed the patient's hemodynamic status          VTE prophylaxis: SCDs/TEDs and enoxaparin ppx    I have performed a physical exam and reviewed and updated ROS and Plan today (1/3/2023). In review of yesterday's note (1/2/2023), there are no changes except as documented above.        "

## 2023-01-03 NOTE — PROGRESS NOTES
"Pharmacy Vancomycin Kinetics Note for 1/2/2023     67 y.o. male on Vancomycin day # 1     Vancomycin Indication (AUC Dosing): Skin/skin structure infection    Provider specified end date: 01/07/23    Active Antibiotics (From admission, onward)      Ordered     Ordering Provider       Mon Jan 2, 2023  4:54 PM    01/02/23 1654  vancomycin 1250 mg/250mL NS IVPB premix  (vancomycin (VANCOCIN) IV (LD + Maintenance))  EVERY 12 HOURS         Daphne Montano M.D.       Mon Jan 2, 2023  4:45 PM    01/02/23 1645  ampicillin/sulbactam (UNASYN) 3 g in  mL IVPB  EVERY 6 HOURS         Daphne Montano M.D.    01/02/23 1645  MD Alert...Vancomycin per Pharmacy  PHARMACY TO DOSE        Question:  Indication(s) for vancomycin?  Answer:  Skin and soft tissue infection    Daphne Montano M.D.       Mon Jan 2, 2023  4:02 PM    01/02/23 1602  vancomycin 3000 mg/500mL NS IVPB premix  (vancomycin (VANCOCIN) IV (LD + Maintenance))  ONCE         Chaparrita Almonte M.D.       Mon Jan 2, 2023  4:00 PM    01/02/23 1600  ampicillin/sulbactam (UNASYN) 3 g in  mL IVPB  ( Diabetic Foot Infection (Simple Sepsis))  ONCE         Chaparrita Almonte M.D.            Dosing Weight: 116 kg (255 lb 11.7 oz)      Admission History: Admitted on 1/2/2023 for Possible SIRS  Pertinent history: Patient with left foot wound with increasing pain. Concern for diabetic foot infection.    Allergies:     Patient has no known allergies.     Pertinent cultures to date:     Results       Procedure Component Value Units Date/Time    CoV-2, FLU A/B, and RSV by PCR (2-4 Hours CEPHEID) : Collect [facility] swab in [facility] [[id number]]     Order Status: Sent Specimen: Respirate     Blood Culture [[id number]] Collected: 01/02/23 1615    Order Status: Sent Specimen: Blood from Peripheral Updated: 01/02/23 1621    Narrative:      2 of 2 blood culture x2  Sites order. Per Hospital Policy:  Only change Specimen Src: to \"Line\" if specified by physician  order.    Blood Culture " "[[id number]] Collected: 23 1600    Order Status: Sent Specimen: Blood from Peripheral Updated: 23 1620    Narrative:      1 of 2 for Blood Culture x 2 sites order. Per Hospital  Policy: Only change Specimen Src: to \"Line\" if specified by  physician order.            Labs:     Estimated Creatinine Clearance: 100.4 mL/min (by C-G formula based on SCr of 0.98 mg/dL).  Recent Labs     23  1501   WBC 18.9*   NEUTSPOLYS 85.20*     Recent Labs     23  1501   BUN 19   CREATININE 0.98       Intake/Output Summary (Last 24 hours) at 2023 1709  Last data filed at 2023 1643  Gross per 24 hour   Intake 100 ml   Output --   Net 100 ml      /74   Pulse 94   Temp 36.6 °C (97.9 °F) (Temporal)   Resp 17   Ht 1.88 m (6' 2\")   Wt 116 kg (255 lb 11.7 oz)   SpO2 96%  Temp (24hrs), Av.6 °C (97.9 °F), Min:36.6 °C (97.9 °F), Max:36.6 °C (97.9 °F)      List concerns for Vancomycin clearance:     Age;Obesity    Pharmacokinetics: AUC Dosing    AUC kinetics:   Ke (hr ^-1): 0.0874 hr^-1  Half life: 7.93 hr  Clearance: 5.043  Estimated TDD: 2521.5  Estimated Dose: 1040  Estimated interval: 9.9    A/P:     -  Vancomycin dose: 1250mg q12h    -  Next vancomycin level(s): Not currently scheduled, consider after 4th maintenance dose.     -  Predicted vancomycin AUC from initial AUC test calculator: 496 mg·hr/L    -  Comments: Please continue to monitor renal function, urine output and vancomycin levels for dosing adjustments. Please also follow cultures and signs of clinical cure for de-escalation of therapy.  MRSA nares ordered.     Nidia Swanson, PharmD    "

## 2023-01-04 ENCOUNTER — APPOINTMENT (OUTPATIENT)
Dept: RADIOLOGY | Facility: MEDICAL CENTER | Age: 68
DRG: 872 | End: 2023-01-04
Attending: FAMILY MEDICINE
Payer: MEDICARE

## 2023-01-04 ENCOUNTER — HOSPITAL ENCOUNTER (INPATIENT)
Facility: MEDICAL CENTER | Age: 68
LOS: 5 days | DRG: 854 | End: 2023-01-09
Attending: HOSPITALIST | Admitting: STUDENT IN AN ORGANIZED HEALTH CARE EDUCATION/TRAINING PROGRAM
Payer: MEDICARE

## 2023-01-04 ENCOUNTER — APPOINTMENT (OUTPATIENT)
Dept: RADIOLOGY | Facility: MEDICAL CENTER | Age: 68
DRG: 854 | End: 2023-01-04
Attending: FAMILY MEDICINE
Payer: MEDICARE

## 2023-01-04 ENCOUNTER — HOSPITAL ENCOUNTER (INPATIENT)
Dept: RADIOLOGY | Facility: MEDICAL CENTER | Age: 68
DRG: 872 | End: 2023-01-04
Attending: FAMILY MEDICINE | Admitting: HOSPITALIST
Payer: MEDICARE

## 2023-01-04 VITALS
HEART RATE: 90 BPM | WEIGHT: 255.73 LBS | OXYGEN SATURATION: 96 % | RESPIRATION RATE: 16 BRPM | HEIGHT: 74 IN | TEMPERATURE: 98.8 F | SYSTOLIC BLOOD PRESSURE: 118 MMHG | BODY MASS INDEX: 32.82 KG/M2 | DIASTOLIC BLOOD PRESSURE: 63 MMHG

## 2023-01-04 DIAGNOSIS — L97.421 DIABETIC ULCER OF LEFT MIDFOOT ASSOCIATED WITH TYPE 2 DIABETES MELLITUS, LIMITED TO BREAKDOWN OF SKIN (HCC): ICD-10-CM

## 2023-01-04 DIAGNOSIS — E11.621 DIABETIC ULCER OF LEFT MIDFOOT ASSOCIATED WITH TYPE 2 DIABETES MELLITUS, LIMITED TO BREAKDOWN OF SKIN (HCC): ICD-10-CM

## 2023-01-04 LAB
ANION GAP SERPL CALC-SCNC: 10 MMOL/L (ref 7–16)
BASOPHILS # BLD AUTO: 0.4 % (ref 0–1.8)
BASOPHILS # BLD: 0.05 K/UL (ref 0–0.12)
BUN SERPL-MCNC: 11 MG/DL (ref 8–22)
CALCIUM SERPL-MCNC: 8.3 MG/DL (ref 8.4–10.2)
CHLORIDE SERPL-SCNC: 101 MMOL/L (ref 96–112)
CO2 SERPL-SCNC: 24 MMOL/L (ref 20–33)
CREAT SERPL-MCNC: 0.64 MG/DL (ref 0.5–1.4)
EOSINOPHIL # BLD AUTO: 0.01 K/UL (ref 0–0.51)
EOSINOPHIL NFR BLD: 0.1 % (ref 0–6.9)
ERYTHROCYTE [DISTWIDTH] IN BLOOD BY AUTOMATED COUNT: 44.1 FL (ref 35.9–50)
GFR SERPLBLD CREATININE-BSD FMLA CKD-EPI: 104 ML/MIN/1.73 M 2
GLUCOSE BLD STRIP.AUTO-MCNC: 198 MG/DL (ref 65–99)
GLUCOSE BLD STRIP.AUTO-MCNC: 207 MG/DL (ref 65–99)
GLUCOSE SERPL-MCNC: 183 MG/DL (ref 65–99)
GRAM STN SPEC: NORMAL
HCT VFR BLD AUTO: 40.7 % (ref 42–52)
HGB BLD-MCNC: 13.1 G/DL (ref 14–18)
IMM GRANULOCYTES # BLD AUTO: 0.05 K/UL (ref 0–0.11)
IMM GRANULOCYTES NFR BLD AUTO: 0.4 % (ref 0–0.9)
LYMPHOCYTES # BLD AUTO: 1.11 K/UL (ref 1–4.8)
LYMPHOCYTES NFR BLD: 8.1 % (ref 22–41)
MCH RBC QN AUTO: 27.9 PG (ref 27–33)
MCHC RBC AUTO-ENTMCNC: 32.2 G/DL (ref 33.7–35.3)
MCV RBC AUTO: 86.6 FL (ref 81.4–97.8)
MONOCYTES # BLD AUTO: 1.07 K/UL (ref 0–0.85)
MONOCYTES NFR BLD AUTO: 7.8 % (ref 0–13.4)
NEUTROPHILS # BLD AUTO: 11.44 K/UL (ref 1.82–7.42)
NEUTROPHILS NFR BLD: 83.2 % (ref 44–72)
NRBC # BLD AUTO: 0 K/UL
NRBC BLD-RTO: 0 /100 WBC
NT-PROBNP SERPL IA-MCNC: 458 PG/ML (ref 0–125)
PLATELET # BLD AUTO: 212 K/UL (ref 164–446)
PMV BLD AUTO: 10.6 FL (ref 9–12.9)
POTASSIUM SERPL-SCNC: 3.6 MMOL/L (ref 3.6–5.5)
RBC # BLD AUTO: 4.7 M/UL (ref 4.7–6.1)
SIGNIFICANT IND 70042: NORMAL
SITE SITE: NORMAL
SODIUM SERPL-SCNC: 135 MMOL/L (ref 135–145)
SOURCE SOURCE: NORMAL
WBC # BLD AUTO: 13.7 K/UL (ref 4.8–10.8)

## 2023-01-04 PROCEDURE — 700105 HCHG RX REV CODE 258: Performed by: FAMILY MEDICINE

## 2023-01-04 PROCEDURE — 700102 HCHG RX REV CODE 250 W/ 637 OVERRIDE(OP): Performed by: FAMILY MEDICINE

## 2023-01-04 PROCEDURE — 82962 GLUCOSE BLOOD TEST: CPT | Mod: 91

## 2023-01-04 PROCEDURE — 36415 COLL VENOUS BLD VENIPUNCTURE: CPT

## 2023-01-04 PROCEDURE — 700111 HCHG RX REV CODE 636 W/ 250 OVERRIDE (IP): Performed by: HOSPITALIST

## 2023-01-04 PROCEDURE — 85025 COMPLETE CBC W/AUTO DIFF WBC: CPT

## 2023-01-04 PROCEDURE — 80048 BASIC METABOLIC PNL TOTAL CA: CPT

## 2023-01-04 PROCEDURE — 700111 HCHG RX REV CODE 636 W/ 250 OVERRIDE (IP): Performed by: FAMILY MEDICINE

## 2023-01-04 PROCEDURE — 83880 ASSAY OF NATRIURETIC PEPTIDE: CPT

## 2023-01-04 PROCEDURE — 82962 GLUCOSE BLOOD TEST: CPT

## 2023-01-04 PROCEDURE — A9270 NON-COVERED ITEM OR SERVICE: HCPCS | Performed by: FAMILY MEDICINE

## 2023-01-04 PROCEDURE — 99223 1ST HOSP IP/OBS HIGH 75: CPT | Mod: GC | Performed by: STUDENT IN AN ORGANIZED HEALTH CARE EDUCATION/TRAINING PROGRAM

## 2023-01-04 PROCEDURE — 700105 HCHG RX REV CODE 258: Performed by: HOSPITALIST

## 2023-01-04 PROCEDURE — 87040 BLOOD CULTURE FOR BACTERIA: CPT

## 2023-01-04 PROCEDURE — 770020 HCHG ROOM/CARE - TELE (206)

## 2023-01-04 RX ORDER — SODIUM CHLORIDE, SODIUM LACTATE, POTASSIUM CHLORIDE, AND CALCIUM CHLORIDE .6; .31; .03; .02 G/100ML; G/100ML; G/100ML; G/100ML
30 INJECTION, SOLUTION INTRAVENOUS
Status: CANCELLED | OUTPATIENT
Start: 2023-01-04

## 2023-01-04 RX ORDER — INSULIN LISPRO 100 [IU]/ML
1-6 INJECTION, SOLUTION INTRAVENOUS; SUBCUTANEOUS
Status: CANCELLED | OUTPATIENT
Start: 2023-01-04

## 2023-01-04 RX ORDER — BISACODYL 10 MG
10 SUPPOSITORY, RECTAL RECTAL
Status: DISCONTINUED | OUTPATIENT
Start: 2023-01-04 | End: 2023-01-09 | Stop reason: HOSPADM

## 2023-01-04 RX ORDER — LINEZOLID 2 MG/ML
600 INJECTION, SOLUTION INTRAVENOUS EVERY 12 HOURS
Status: DISCONTINUED | OUTPATIENT
Start: 2023-01-04 | End: 2023-01-04 | Stop reason: HOSPADM

## 2023-01-04 RX ORDER — HYDROMORPHONE HYDROCHLORIDE 1 MG/ML
0.25 INJECTION, SOLUTION INTRAMUSCULAR; INTRAVENOUS; SUBCUTANEOUS
Status: CANCELLED | OUTPATIENT
Start: 2023-01-04

## 2023-01-04 RX ORDER — LINEZOLID 2 MG/ML
600 INJECTION, SOLUTION INTRAVENOUS EVERY 12 HOURS
Status: CANCELLED | OUTPATIENT
Start: 2023-01-05 | End: 2023-01-11

## 2023-01-04 RX ORDER — INSULIN LISPRO 100 [IU]/ML
1-6 INJECTION, SOLUTION INTRAVENOUS; SUBCUTANEOUS
Status: DISCONTINUED | OUTPATIENT
Start: 2023-01-04 | End: 2023-01-05

## 2023-01-04 RX ORDER — OXYCODONE HYDROCHLORIDE 5 MG/1
5 TABLET ORAL
Status: DISCONTINUED | OUTPATIENT
Start: 2023-01-04 | End: 2023-01-09 | Stop reason: HOSPADM

## 2023-01-04 RX ORDER — BISACODYL 10 MG
10 SUPPOSITORY, RECTAL RECTAL
Status: CANCELLED | OUTPATIENT
Start: 2023-01-04

## 2023-01-04 RX ORDER — OXYCODONE HYDROCHLORIDE 5 MG/1
2.5 TABLET ORAL
Status: CANCELLED | OUTPATIENT
Start: 2023-01-04

## 2023-01-04 RX ORDER — SODIUM CHLORIDE 9 MG/ML
INJECTION, SOLUTION INTRAVENOUS CONTINUOUS
Status: CANCELLED | OUTPATIENT
Start: 2023-01-04

## 2023-01-04 RX ORDER — SODIUM CHLORIDE, SODIUM LACTATE, POTASSIUM CHLORIDE, AND CALCIUM CHLORIDE .6; .31; .03; .02 G/100ML; G/100ML; G/100ML; G/100ML
30 INJECTION, SOLUTION INTRAVENOUS
Status: DISCONTINUED | OUTPATIENT
Start: 2023-01-04 | End: 2023-01-09 | Stop reason: HOSPADM

## 2023-01-04 RX ORDER — OXYCODONE HYDROCHLORIDE 5 MG/1
5 TABLET ORAL
Status: CANCELLED | OUTPATIENT
Start: 2023-01-04

## 2023-01-04 RX ORDER — ACETAMINOPHEN 325 MG/1
650 TABLET ORAL EVERY 6 HOURS PRN
Status: CANCELLED | OUTPATIENT
Start: 2023-01-04

## 2023-01-04 RX ORDER — ENOXAPARIN SODIUM 100 MG/ML
40 INJECTION SUBCUTANEOUS DAILY
Status: CANCELLED | OUTPATIENT
Start: 2023-01-04

## 2023-01-04 RX ORDER — HYDROMORPHONE HYDROCHLORIDE 1 MG/ML
0.25 INJECTION, SOLUTION INTRAMUSCULAR; INTRAVENOUS; SUBCUTANEOUS
Status: DISCONTINUED | OUTPATIENT
Start: 2023-01-04 | End: 2023-01-09 | Stop reason: HOSPADM

## 2023-01-04 RX ORDER — SODIUM CHLORIDE 9 MG/ML
INJECTION, SOLUTION INTRAVENOUS CONTINUOUS
Status: DISCONTINUED | OUTPATIENT
Start: 2023-01-04 | End: 2023-01-04

## 2023-01-04 RX ORDER — SODIUM CHLORIDE, SODIUM LACTATE, POTASSIUM CHLORIDE, AND CALCIUM CHLORIDE .6; .31; .03; .02 G/100ML; G/100ML; G/100ML; G/100ML
500 INJECTION, SOLUTION INTRAVENOUS
Status: CANCELLED | OUTPATIENT
Start: 2023-01-04

## 2023-01-04 RX ORDER — LINEZOLID 2 MG/ML
600 INJECTION, SOLUTION INTRAVENOUS EVERY 12 HOURS
Status: DISCONTINUED | OUTPATIENT
Start: 2023-01-04 | End: 2023-01-06

## 2023-01-04 RX ORDER — ACETAMINOPHEN 325 MG/1
650 TABLET ORAL EVERY 6 HOURS PRN
Status: DISCONTINUED | OUTPATIENT
Start: 2023-01-04 | End: 2023-01-09 | Stop reason: HOSPADM

## 2023-01-04 RX ORDER — SODIUM CHLORIDE, SODIUM LACTATE, POTASSIUM CHLORIDE, AND CALCIUM CHLORIDE .6; .31; .03; .02 G/100ML; G/100ML; G/100ML; G/100ML
500 INJECTION, SOLUTION INTRAVENOUS
Status: DISCONTINUED | OUTPATIENT
Start: 2023-01-04 | End: 2023-01-09 | Stop reason: HOSPADM

## 2023-01-04 RX ORDER — AMOXICILLIN 250 MG
2 CAPSULE ORAL 2 TIMES DAILY
Status: CANCELLED | OUTPATIENT
Start: 2023-01-04

## 2023-01-04 RX ORDER — POLYETHYLENE GLYCOL 3350 17 G/17G
1 POWDER, FOR SOLUTION ORAL
Status: CANCELLED | OUTPATIENT
Start: 2023-01-04

## 2023-01-04 RX ORDER — AMOXICILLIN 250 MG
2 CAPSULE ORAL 2 TIMES DAILY
Status: DISCONTINUED | OUTPATIENT
Start: 2023-01-04 | End: 2023-01-09 | Stop reason: HOSPADM

## 2023-01-04 RX ORDER — POLYETHYLENE GLYCOL 3350 17 G/17G
1 POWDER, FOR SOLUTION ORAL
Status: DISCONTINUED | OUTPATIENT
Start: 2023-01-04 | End: 2023-01-09 | Stop reason: HOSPADM

## 2023-01-04 RX ORDER — ENOXAPARIN SODIUM 100 MG/ML
40 INJECTION SUBCUTANEOUS DAILY
Status: DISCONTINUED | OUTPATIENT
Start: 2023-01-04 | End: 2023-01-04

## 2023-01-04 RX ORDER — OXYCODONE HYDROCHLORIDE 5 MG/1
2.5 TABLET ORAL
Status: DISCONTINUED | OUTPATIENT
Start: 2023-01-04 | End: 2023-01-09 | Stop reason: HOSPADM

## 2023-01-04 RX ADMIN — LINEZOLID 600 MG: 600 INJECTION, SOLUTION INTRAVENOUS at 21:45

## 2023-01-04 RX ADMIN — INSULIN LISPRO 2 UNITS: 100 INJECTION, SOLUTION INTRAVENOUS; SUBCUTANEOUS at 11:46

## 2023-01-04 RX ADMIN — LINEZOLID 600 MG: 600 INJECTION, SOLUTION INTRAVENOUS at 13:40

## 2023-01-04 RX ADMIN — INSULIN GLARGINE-YFGN 10 UNITS: 100 INJECTION, SOLUTION SUBCUTANEOUS at 20:58

## 2023-01-04 RX ADMIN — AMPICILLIN AND SULBACTAM 3 G: 1; 2 INJECTION, POWDER, FOR SOLUTION INTRAMUSCULAR; INTRAVENOUS at 05:06

## 2023-01-04 RX ADMIN — SODIUM CHLORIDE: 9 INJECTION, SOLUTION INTRAVENOUS at 19:51

## 2023-01-04 RX ADMIN — ENOXAPARIN SODIUM 40 MG: 100 INJECTION SUBCUTANEOUS at 19:59

## 2023-01-04 RX ADMIN — AMPICILLIN AND SULBACTAM 3 G: 1; 2 INJECTION, POWDER, FOR SOLUTION INTRAMUSCULAR; INTRAVENOUS at 12:12

## 2023-01-04 RX ADMIN — AMPICILLIN AND SULBACTAM 3 G: 1; 2 INJECTION, POWDER, FOR SOLUTION INTRAMUSCULAR; INTRAVENOUS at 19:52

## 2023-01-04 RX ADMIN — VANCOMYCIN HYDROCHLORIDE 1250 MG: 1 INJECTION, POWDER, LYOPHILIZED, FOR SOLUTION INTRAVENOUS at 06:13

## 2023-01-04 RX ADMIN — ACETAMINOPHEN 650 MG: 325 TABLET ORAL at 19:59

## 2023-01-04 RX ADMIN — DOCUSATE SODIUM 50 MG AND SENNOSIDES 8.6 MG 2 TABLET: 8.6; 5 TABLET, FILM COATED ORAL at 19:58

## 2023-01-04 RX ADMIN — AMPICILLIN AND SULBACTAM 3 G: 1; 2 INJECTION, POWDER, FOR SOLUTION INTRAMUSCULAR; INTRAVENOUS at 00:01

## 2023-01-04 RX ADMIN — INSULIN LISPRO 1 UNITS: 100 INJECTION, SOLUTION INTRAVENOUS; SUBCUTANEOUS at 06:14

## 2023-01-04 RX ADMIN — OXYCODONE HYDROCHLORIDE 5 MG: 5 TABLET ORAL at 19:58

## 2023-01-04 RX ADMIN — SODIUM CHLORIDE: 9 INJECTION, SOLUTION INTRAVENOUS at 05:05

## 2023-01-04 ASSESSMENT — ENCOUNTER SYMPTOMS
COUGH: 0
SHORTNESS OF BREATH: 0
COUGH: 0
EYES NEGATIVE: 1
CHILLS: 0
FOCAL WEAKNESS: 0
VOMITING: 0
CHILLS: 1
NAUSEA: 0
ABDOMINAL PAIN: 0
VOMITING: 0
SPUTUM PRODUCTION: 0
HEADACHES: 0
FEVER: 1
SHORTNESS OF BREATH: 0
ABDOMINAL PAIN: 0
SENSORY CHANGE: 0
FEVER: 0
PALPITATIONS: 0
WEAKNESS: 0
NAUSEA: 0

## 2023-01-04 ASSESSMENT — PATIENT HEALTH QUESTIONNAIRE - PHQ9
2. FEELING DOWN, DEPRESSED, IRRITABLE, OR HOPELESS: NOT AT ALL
SUM OF ALL RESPONSES TO PHQ9 QUESTIONS 1 AND 2: 0
1. LITTLE INTEREST OR PLEASURE IN DOING THINGS: NOT AT ALL

## 2023-01-04 ASSESSMENT — PAIN DESCRIPTION - PAIN TYPE: TYPE: ACUTE PAIN

## 2023-01-04 NOTE — CARE PLAN
The patient is Stable - Low risk of patient condition declining or worsening    Shift Goals  Clinical Goals: Pain management this shift, rate pain at 3/10 or lower  Patient Goals: sleep  Family Goals: n/a    Progress made toward(s) clinical / shift goals:  Administered pain meds per mar, pt reported pain at 0/10 post interventions.    Patient is not progressing towards the following goals:N/a

## 2023-01-04 NOTE — DISCHARGE SUMMARY
Discharge Summary    CHIEF COMPLAINT ON ADMISSION  Chief Complaint   Patient presents with    Foot Pain       Reason for Admission  Foot Pain; Leg Pain     Admission Date  1/2/2023    CODE STATUS  Full Code    HPI & HOSPITAL COURSE  This is a 67 y.o. male here with a history of diabetes - he is no longer taking Metformin by his own choice, and A1c is 9.3. He was admitted with a diabetic foot wound - it started a month ago and had worsening for the 4 days prior to admission.  He did meet sepsis criteria upon admission, and superficial wound culture grew MRSA preliminarily. MRI did not demonstrate abscess or osteo, but pt does have some purulence from the wound, and tense edema under the wound and into the arch of the foot. He is on Zyvox/Unasyn given his wound culture, and in discussion with Dr Lozano, it is recommended he transfer to [facility] for LPS. The pt is in agreement with this plan and transfer will be initiated.       Therefore, he is discharged in fair and stable condition to a short-term general hosptial for inpatient care.    The patient met 2-midnight criteria for an inpatient stay at the time of discharge.    Discharge Date  1/4/23    FOLLOW UP ITEMS POST DISCHARGE  Pt to transfer to [facility]    DISCHARGE DIAGNOSES  Principal Problem:    Diabetic foot infection (HCC) POA: Yes  Active Problems:    Sepsis (HCC) POA: Yes    Type 2 diabetes mellitus with circulatory disorder, without long-term current use of insulin (HCC) POA: Yes    Hyponatremia POA: Unknown  Resolved Problems:    * No resolved hospital problems. *      FOLLOW UP  No future appointments.  No follow-up provider specified.    MEDICATIONS ON DISCHARGE     Medication List      You have not been prescribed any medications.         Allergies  No Known Allergies    DIET  Orders Placed This Encounter   Procedures    Diet Order Diet: Consistent CHO (Diabetic)     Standing Status:   Standing     Number of Occurrences:   1     Order Specific Question:    Diet:     Answer:   Consistent CHO (Diabetic) [4]    Diet NPO Restrict to: Sips with Medications     Standing Status:   Standing     Number of Occurrences:   8     Order Specific Question:   Diet NPO Restrict to:     Answer:   Sips with Medications [3]       ACTIVITY  As tolerated.  Weight bearing as tolerated    CONSULTATIONS  Dr Lozano    PROCEDURES  None    LABORATORY  Lab Results   Component Value Date    SODIUM 135 01/04/2023    POTASSIUM 3.6 01/04/2023    CHLORIDE 101 01/04/2023    CO2 24 01/04/2023    GLUCOSE 183 (H) 01/04/2023    BUN 11 01/04/2023    CREATININE 0.64 01/04/2023        Lab Results   Component Value Date    WBC 13.7 (H) 01/04/2023    HEMOGLOBIN 13.1 (L) 01/04/2023    HEMATOCRIT 40.7 (L) 01/04/2023    PLATELETCT 212 01/04/2023        Total time of the discharge process exceeds 38 minutes.

## 2023-01-04 NOTE — PROGRESS NOTES
Received bedside report from day shift [employer]. Assumed pt care.   Pt seen AO4, reported pain at 5/10, medicated per mar. No nausea reported at this time.   Safety and fall precautions in place.    Call light kept within reach.  No other needs at this time.   Will continue to monitor.

## 2023-01-04 NOTE — CARE PLAN
The patient is Stable - Low risk of patient condition declining or worsening    Shift Goals  Clinical Goals: Monitor for pain, HR and BS  Patient Goals: rest comfortably  Family Goals: n/a    Progress made toward(s) clinical / shift goals: Pt able to verbalize pain relief with prescribed pain medications. Heart rate sustains between 108-113. Still on continuous tele monitoring.    Patient is not progressing towards the following goals:n/a

## 2023-01-04 NOTE — PROGRESS NOTES
Telemetry Shift Summary     Rhythm: SR-ST  Rate:   Measurements: .16/.08/.34  Ectopy (reported by Monitor Tech): o Trig, f PVC, r Big     Normal Values  Rhythm: Sinus  HR:   Measurements: 0.12-0.20/0.06-0.10/0.30-0.52

## 2023-01-04 NOTE — WOUND TEAM
[facility]  Inpatient Services  Initial Wound and Skin Care Evaluation    Admission Date: 1/2/2023     Last order of IP CONSULT TO WOUND CARE was found on 1/3/2023 from Hospital Encounter on 1/2/2023     HPI, PMH, SH: Reviewed    Past Surgical History:   Procedure Laterality Date    DENTAL EXTRACTION(S)       Social History     Tobacco Use    Smoking status: Never    Smokeless tobacco: Never   Substance Use Topics    Alcohol use: Not Currently     Chief Complaint   Patient presents with    Foot Pain     Diagnosis: Diabetic foot infection (HCC) [E11.628, L08.9]    Unit where seen by Wound Team: 1110/01     WOUND CONSULT/FOLLOW UP RELATED TO:  left plantar foot     WOUND HISTORY:  patient states the wound started about a month ago but 4 days ago it progressively got worse,he is diabetic. He also believes he had a gatorade cap in his shoe while he was at work earlier this week and thinks that's what caused the surrounding intact blister.    His foot is warm, swollen, and red. Purulent drainage noted.     WOUND ASSESSMENT/LDA  Wound 01/04/23 Diabetic Ulcer Foot Plantar Left POA full thickness w/ surrounding blister (Active)   Wound Image   01/04/23 0900   Site Assessment Yellow;Drainage;Pale 01/04/23 0900   Periwound Assessment Satellite lesions;Callused;Maceration;Red;Warm;Edema;Fragile 01/04/23 0900   Margins Unattached edges;Undefined edges 01/04/23 0900   Closure Secondary intention 01/04/23 0900   Drainage Amount Small 01/04/23 0900   Drainage Description Purulent 01/04/23 0900   Treatments Cleansed;Site care;Tape change 01/04/23 0900   Wound Cleansing Normal Saline Irrigation 01/04/23 0900   Periwound Protectant Skin Protectant Wipes to Periwound 01/04/23 0900   Dressing Cleansing/Solutions Not Applicable 01/04/23 0900   Dressing Options Iodoform Strip Packing;Mepilex 01/04/23 0900   Dressing Changed New 01/04/23 0900   Dressing Status Clean;Dry;Intact 01/04/23 0900   Dressing Change/Treatment  "Frequency Every Shift, and As Needed 01/04/23 0900   NEXT Dressing Change/Treatment Date 01/04/23 01/04/23 0900   NEXT Weekly Photo (Inpatient Only) 01/11/23 01/04/23 0900   Non-staged Wound Description Full thickness 01/04/23 0900   Wound Length (cm) 0.5 cm 01/04/23 0900   Wound Width (cm) 0.5 cm 01/04/23 0900   Wound Depth (cm) 0.8 cm 01/04/23 0900   Wound Surface Area (cm^2) 0.25 cm^2 01/04/23 0900   Wound Volume (cm^3) 0.2 cm^3 01/04/23 0900   Exposed Structures MICHELINE 01/04/23 0900   Number of days: 0        Vascular:    ONUR:   No results found.    Lab Values:    Lab Results   Component Value Date/Time    WBC 13.7 (H) 01/04/2023 01:36 AM    RBC 4.70 01/04/2023 01:36 AM    HEMOGLOBIN 13.1 (L) 01/04/2023 01:36 AM    HEMATOCRIT 40.7 (L) 01/04/2023 01:36 AM    CREACTPROT 22.64 (H) 01/02/2023 03:01 PM    SEDRATEWES 20 01/02/2023 03:01 PM    HBA1C 9.3 (H) 01/02/2023 03:01 PM        Culture Results show:  No results found for this or any previous visit (from the past 720 hour(s)).    Pain Level/Medicated:  neuropathy, no pain.    Surrounding swollen tissue is tender.    INTERVENTIONS BY WOUND TEAM:  Chart and images reviewed. Discussed with bedside RN. All areas of concern (based on picture review, LDA review and discussion with bedside RN) have been thoroughly assessed. Documentation of areas based on significant findings. This RN in to assess patient. Performed standard wound care which includes appropriate positioning, dressing removal and non-selective debridement. Pictures and measurements obtained weekly if/when required.  Left plantar foot  Preparation for Dressing removal: removed adhesive foam  Non-selectively Debrided with:  NS and gauze.  Sharp debridement: n/a  Becky wound: Cleansed with NS and gauze, Prepped with no sting skin prep  Primary Dressing: packing with iodoform 1/2\" strip packing  Secondary (Outer) Dressing: mepilex    Interdisciplinary consultation: Patient, Bedside RN, MD Whalen updated      Per " "MD: MRI did not show any bone involvement.     EVALUATION / RATIONALE FOR TREATMENT:  Most Recent Date:  1/4/23: patient left plantar foot near 1st MTH with full thickness diabetic wound present, odorous purulent drainage noted, able to palpate to fatty pad covering bone but did not palpate bone. Surrounding periwound with thick intact blister, appears purulent filled, kept intact. Patient foot red, edematous, warm, and tender. Patient full thickness wound packed with 1/2\" iodoform strip packing to assist with cleansing of the wound bed, drainage, and to assist in healing by secondary intention. Patient would benefit from wound wash cloth to remove the non-viable tissue and present with clean wound bed for proper healing, per MD, Ortho consulted.      Goals: Steady decrease in wound area and depth weekly.    WOUND TEAM PLAN OF CARE ([X] for frequency of wound follow up,):   Nursing to follow dressing orders written for wound care. Contact wound team if area fails to progress, deteriorates or with any questions/concerns if something comes up before next scheduled follow up (See below as to whether wound is following and frequency of wound follow up)  Dressing changes by wound team:                   Follow up 3 times weekly:                NPWT change 3 times weekly:     Follow up 1-2 times weekly:   X weekly    Follow up Bi-Monthly:           Follow up Monthly (High Risk):                        Follow up as needed:     Other (explain):     NURSING PLAN OF CARE ORDERS (X):  Dressing changes: See Dressing Care orders: X  Skin care: See Skin Care orders:   RN Prevention Protocol: X  Rectal tube care: See Rectal Tube Care orders:   Other orders:    RSKIN:   CURRENTLY IN PLACE (X), APPLIED THIS VISIT (A), ORDERED (O):   Q shift Chalo:  X  Q shift pressure point assessments:  X    Surface/Positioning   Pressure redistribution mattress      X      Low Airloss          ICU Low Airloss   Bariatric WYATT     Waffle cushion    "     Waffle Overlay          Reposition q 2 hours  turn self   TAPs Turning system     Z Dread Pillow     Offloading/Redistribution   Sacral Mepilex (Silicone dressing)   MICHELINE  Heel Mepilex (Silicone dressing)    X     Heel float boots (Prevalon boot)             Float Heels off Bed with Pillows           Respiratory   Silicone O2 tubing   X      Gray Foam Ear protectors     Cannula fixation Device (Tender )          High flow offloading Clip    Elastic head band offloading device      Anchorfast                                                         Trach with Optifoam split foam             Containment/Moisture Prevention n/a    Rectal tube or BMS    Purwick/Condom Cath        Smith Catheter    Barrier wipes           Barrier paste       Antifungal tx      Interdry        Mobilization       Up to chair        Ambulate    X - painful   PT/OT      Nutrition       Dietician        Diabetes Education      PO   X diabetic  TF     TPN     NPO   # days     Other        Anticipated discharge plans: TBD, will benefit from continued care  LTACH:        SNF/Rehab:                  Home Health Care:           Outpatient Wound Center:  X     referral placed     Self/Family Care:        Other:                  Vac Discharge Needs:   Not Applicable Pt not on a wound vac:  X     Regular Vac while inpatient, alternative dressing at DC:        Regular Vac in use and continued at DC:            Reg. Vac w/ Skin Sub/Biologic in use. Will need to be changed 2x wkly:      Veraflo Vac while inpatient, ok to transition to Regular Vac on Discharge:           Veraflo Vac while inpatient, will need to remain on Veraflo Vac upon discharge:

## 2023-01-04 NOTE — PROGRESS NOTES
Hospital Medicine Daily Progress Note    Date of Service  1/4/2023    Chief Complaint  Jimmy Amaya is a 67 y.o. male admitted 1/2/2023 with foot pain    Hospital Course  Jimmy Amaya is a 67 y.o. male with a past medical history of poorly controlled diabetes who presented 1/2/2023 with progressively worsening foot pain redness and swelling.  Patient reports that he has an ulcer of the left foot that has not been healing well over the past 1 month.  However, over the past 4 days he has been having progressively worsening pain redness swelling from the ulcer.  The pain was initially mild but now is severe rated as 8/10.  The pain is worse with weightbearing and with walking.  The pain does not radiate to other places.  No relieving factors other than pain medications.  He also reports generalized weakness and chills but denies having fevers.     Interval Problem Update  1/3 - Pt tachycardic overnight, received a bolus, remains with mild tachycardia this am.  Borderline temp at 99.8 this morning, on room air. White count down with Unasyn and Vanc, what appears to be dilutional anemia, and hyponatremia improved to 132 with IVFs.  this am with 5u BID of Lantus, will change to 10u nightly. Pt states he is supposed to be on Metformin but had not been taking it. Blood cultures are NTD, plain film of foot with swelling over the MTP joint, possible erosion at the base of the 1st proximal phalanx suggestive of possibly gout - no evidence of osteo. MRI without osteo or abscess - will check wound culture, continue antibiotics and have Wound Care see pt.     1/4 - Pt with some continued tachycardia but improved, spiked a temp to 100.6 last evening - white count slowly trending down, Bss still mildly elevated but just received 10u Lantus dose change last night. Blood cultures from admit are negative, will repeat today. Gram stain of foot with GPCs. MRI foot completed yesterday with wound and cellulitis, but no evidence of  osteo or abscess. Continue Unasyn and Vanc. Prelim wound culture growing MRSA - despite negative MRI, I think he needs debridement - page out to Ortho to discuss. Referral placed to wound care as an outpatient.     I have discussed this patient's plan of care and discharge plan at IDT rounds today with Case Management, Nursing, Nursing leadership, and other members of the IDT team.    Consultants/Specialty  none    Code Status  Full Code    Disposition  Patient is not medically cleared for discharge.   Anticipate discharge to to home with close outpatient follow-up.  I have placed the appropriate orders for post-discharge needs.    Review of Systems  Review of Systems   Constitutional:  Positive for chills and fever.   Respiratory:  Negative for cough and shortness of breath.    Cardiovascular:  Negative for chest pain and leg swelling.   Gastrointestinal:  Negative for abdominal pain, nausea and vomiting.   Musculoskeletal:  Positive for joint pain (foot).   All other systems reviewed and are negative.     Physical Exam  Temp:  [36.9 °C (98.5 °F)-38.1 °C (100.6 °F)] 37 °C (98.6 °F)  Pulse:  [] 96  Resp:  [15-20] 15  BP: (120-136)/(65-78) 120/65  SpO2:  [88 %-95 %] 95 %    Physical Exam    Fluids    Intake/Output Summary (Last 24 hours) at 1/4/2023 0755  Last data filed at 1/4/2023 0704  Gross per 24 hour   Intake 2736 ml   Output 1025 ml   Net 1711 ml         Laboratory  Recent Labs     01/02/23  1501 01/03/23  0255 01/04/23  0136   WBC 18.9* 14.0* 13.7*   RBC 5.52 4.88 4.70   HEMOGLOBIN 15.5 13.6* 13.1*   HEMATOCRIT 47.0 41.5* 40.7*   MCV 85.1 85.0 86.6   MCH 28.1 27.9 27.9   MCHC 33.0* 32.8* 32.2*   RDW 43.8 43.3 44.1   PLATELETCT 221 200 212   MPV 10.2 10.4 10.6       Recent Labs     01/02/23  1501 01/03/23  0255 01/04/23  0136   SODIUM 130* 132* 135   POTASSIUM 4.3 3.8 3.6   CHLORIDE 97 101 101   CO2 22 21 24   GLUCOSE 257* 224* 183*   BUN 19 15 11   CREATININE 0.98 0.72 0.64   CALCIUM 9.1 8.4 8.3*         "             Imaging  MR-FOOT-W/O LEFT   Final Result      1.  No evidence of osteomyelitis.      2.  Cellulitis and soft tissue ulceration.             Assessment/Plan  * Diabetic foot infection (HCC)- (present on admission)  Assessment & Plan  - Started on Unasyn and vancomycin in the emergency room continue for now follow on cultures and sensitivities  MRI and plain films without evidence of osteo or underlying abscess. However, pt with some purulence expressed by myself, tense erythema under the arch of his foot - may benefit from I&D. Will contact Ortho and make pt NPO after midnight.   - MRSA by culture, discussed with [facility], changing Vanc to Zyvox to achieve therapeutic levels sooner     Hyponatremia  Assessment & Plan  - Resolved    Type 2 diabetes mellitus with circulatory disorder, without long-term current use of insulin (HCC)- (present on admission)  Assessment & Plan  Poorly controlled, with hyperglycemia  Last glycated hemoglobin was 10.9%   10u Lantus at night  Accu-Checks, hypoglycemia protocol  Adjust according to blood sugars trend  A1c 9.3.. Pt states he stopped taking his metformin \"because I'm stupid\".     Sepsis ([facility])- (present on admission)  Assessment & Plan  This is Sepsis Present on admission  SIRS criteria identified on my evaluation include: Tachycardia, with heart rate greater than 90 BPM and Leukocytosis, with WBC greater than 12,000  Source is diabetic foot infection  Sepsis protocol initiated  Fluid resuscitation ordered per protocol  Crystalloid Fluid Administration: Fluid resuscitation ordered per standard protocol - 30 mL/kg per current or ideal body weight  IV antibiotics as appropriate for source of sepsis  Reassessment: I have reassessed the patient's hemodynamic status    MRSA on culture of foot, continue Zyvox and Unasyn, MRI without abscess or osteo. However, given the severity of cellulitis and wound, will ask Ortho to weigh in on possible debridement.           VTE " prophylaxis: SCDs/TEDs and enoxaparin ppx    I have performed a physical exam and reviewed and updated ROS and Plan today (1/4/2023). In review of yesterday's note (1/3/2023), there are no changes except as documented above.

## 2023-01-04 NOTE — DISCHARGE PLANNING
Case Management Discharge Planning    Admission Date: 1/2/2023  GMLOS: 3.5  ALOS: 2    6-Clicks ADL Score: 24  6-Clicks Mobility Score: 24      Anticipated Discharge Dispo: Discharge Disposition: Discharged to home/self care (01)    DME Needed: No    Action(s) Taken: Updated Provider/Nurse on Discharge Plan    PCS form faxed to ride line at [phone].  Anticipate patient to transfer to [facility] today.     1616: Per Dian with RTOC, patient leaving today at 1645.  RN notified.     Escalations Completed: None    Medically Clear: No    Next Steps: Medical clearance    Barriers to Discharge: Medical clearance

## 2023-01-04 NOTE — PROGRESS NOTES
Bedside report received from night RN. Assumed care of patient. Alert and oriented, wakes easily to voice. Daily plan of care discussed. Pt denies pain at this time, requested to take some rest. Hourly rounding in place.

## 2023-01-04 NOTE — PROGRESS NOTES
Telemetry Shift Summary    Rhythm ST  HR Range 100s-120s  Ectopy frequent PVC's, frequent couplets, occasional trigeminy, rare bigeminy  Measurements 0.16/0.08/0.38        Normal Values  Rhythm SR  HR Range    Measurements 0.12-0.20 / 0.06-0.10  / 0.30-0.52

## 2023-01-05 ENCOUNTER — ANESTHESIA EVENT (OUTPATIENT)
Dept: SURGERY | Facility: MEDICAL CENTER | Age: 68
DRG: 854 | End: 2023-01-05
Payer: MEDICARE

## 2023-01-05 ENCOUNTER — APPOINTMENT (OUTPATIENT)
Dept: RADIOLOGY | Facility: MEDICAL CENTER | Age: 68
DRG: 854 | End: 2023-01-05
Attending: FAMILY MEDICINE
Payer: MEDICARE

## 2023-01-05 ENCOUNTER — ANESTHESIA (OUTPATIENT)
Dept: SURGERY | Facility: MEDICAL CENTER | Age: 68
DRG: 854 | End: 2023-01-05
Payer: MEDICARE

## 2023-01-05 PROBLEM — E87.6 HYPOKALEMIA: Status: ACTIVE | Noted: 2023-01-05

## 2023-01-05 PROBLEM — L03.116 MRSA CELLULITIS OF LEFT FOOT: Status: ACTIVE | Noted: 2023-01-05

## 2023-01-05 PROBLEM — B95.62 MRSA CELLULITIS OF LEFT FOOT: Status: ACTIVE | Noted: 2023-01-05

## 2023-01-05 LAB
ANION GAP SERPL CALC-SCNC: 9 MMOL/L (ref 7–16)
BASOPHILS # BLD AUTO: 0.4 % (ref 0–1.8)
BASOPHILS # BLD: 0.07 K/UL (ref 0–0.12)
BUN SERPL-MCNC: 8 MG/DL (ref 8–22)
CALCIUM SERPL-MCNC: 8.1 MG/DL (ref 8.5–10.5)
CHLORIDE SERPL-SCNC: 98 MMOL/L (ref 96–112)
CO2 SERPL-SCNC: 23 MMOL/L (ref 20–33)
CREAT SERPL-MCNC: 0.54 MG/DL (ref 0.5–1.4)
EOSINOPHIL # BLD AUTO: 0.03 K/UL (ref 0–0.51)
EOSINOPHIL NFR BLD: 0.2 % (ref 0–6.9)
ERYTHROCYTE [DISTWIDTH] IN BLOOD BY AUTOMATED COUNT: 42.4 FL (ref 35.9–50)
EST. AVERAGE GLUCOSE BLD GHB EST-MCNC: 212 MG/DL
GFR SERPLBLD CREATININE-BSD FMLA CKD-EPI: 110 ML/MIN/1.73 M 2
GLUCOSE BLD STRIP.AUTO-MCNC: 171 MG/DL (ref 65–99)
GLUCOSE BLD STRIP.AUTO-MCNC: 175 MG/DL (ref 65–99)
GLUCOSE BLD STRIP.AUTO-MCNC: 207 MG/DL (ref 65–99)
GLUCOSE SERPL-MCNC: 201 MG/DL (ref 65–99)
HBA1C MFR BLD: 9 % (ref 4–5.6)
HCT VFR BLD AUTO: 38.4 % (ref 42–52)
HGB BLD-MCNC: 12.6 G/DL (ref 14–18)
IMM GRANULOCYTES # BLD AUTO: 0.1 K/UL (ref 0–0.11)
IMM GRANULOCYTES NFR BLD AUTO: 0.6 % (ref 0–0.9)
LYMPHOCYTES # BLD AUTO: 1.09 K/UL (ref 1–4.8)
LYMPHOCYTES NFR BLD: 6.9 % (ref 22–41)
MAGNESIUM SERPL-MCNC: 1.7 MG/DL (ref 1.5–2.5)
MCH RBC QN AUTO: 27.6 PG (ref 27–33)
MCHC RBC AUTO-ENTMCNC: 32.8 G/DL (ref 33.7–35.3)
MCV RBC AUTO: 84.2 FL (ref 81.4–97.8)
MONOCYTES # BLD AUTO: 1.4 K/UL (ref 0–0.85)
MONOCYTES NFR BLD AUTO: 8.8 % (ref 0–13.4)
NEUTROPHILS # BLD AUTO: 13.14 K/UL (ref 1.82–7.42)
NEUTROPHILS NFR BLD: 83.1 % (ref 44–72)
NRBC # BLD AUTO: 0 K/UL
NRBC BLD-RTO: 0 /100 WBC
PLATELET # BLD AUTO: 216 K/UL (ref 164–446)
PMV BLD AUTO: 10.1 FL (ref 9–12.9)
POTASSIUM SERPL-SCNC: 3.3 MMOL/L (ref 3.6–5.5)
RBC # BLD AUTO: 4.56 M/UL (ref 4.7–6.1)
SODIUM SERPL-SCNC: 130 MMOL/L (ref 135–145)
WBC # BLD AUTO: 15.8 K/UL (ref 4.8–10.8)

## 2023-01-05 PROCEDURE — 700105 HCHG RX REV CODE 258: Performed by: FAMILY MEDICINE

## 2023-01-05 PROCEDURE — 87077 CULTURE AEROBIC IDENTIFY: CPT | Mod: 91

## 2023-01-05 PROCEDURE — 99233 SBSQ HOSP IP/OBS HIGH 50: CPT | Mod: GC | Performed by: STUDENT IN AN ORGANIZED HEALTH CARE EDUCATION/TRAINING PROGRAM

## 2023-01-05 PROCEDURE — 83735 ASSAY OF MAGNESIUM: CPT

## 2023-01-05 PROCEDURE — 87070 CULTURE OTHR SPECIMN AEROBIC: CPT

## 2023-01-05 PROCEDURE — 160009 HCHG ANES TIME/MIN: Performed by: ORTHOPAEDIC SURGERY

## 2023-01-05 PROCEDURE — 99221 1ST HOSP IP/OBS SF/LOW 40: CPT

## 2023-01-05 PROCEDURE — 160048 HCHG OR STATISTICAL LEVEL 1-5: Performed by: ORTHOPAEDIC SURGERY

## 2023-01-05 PROCEDURE — 160002 HCHG RECOVERY MINUTES (STAT): Performed by: ORTHOPAEDIC SURGERY

## 2023-01-05 PROCEDURE — 87102 FUNGUS ISOLATION CULTURE: CPT

## 2023-01-05 PROCEDURE — 28003 TREATMENT OF FOOT INFECTION: CPT | Mod: LT | Performed by: ORTHOPAEDIC SURGERY

## 2023-01-05 PROCEDURE — 00400 ANES INTEGUMENTARY SYS NOS: CPT | Performed by: STUDENT IN AN ORGANIZED HEALTH CARE EDUCATION/TRAINING PROGRAM

## 2023-01-05 PROCEDURE — 80048 BASIC METABOLIC PNL TOTAL CA: CPT

## 2023-01-05 PROCEDURE — 160035 HCHG PACU - 1ST 60 MINS PHASE I: Performed by: ORTHOPAEDIC SURGERY

## 2023-01-05 PROCEDURE — 700111 HCHG RX REV CODE 636 W/ 250 OVERRIDE (IP): Performed by: FAMILY MEDICINE

## 2023-01-05 PROCEDURE — 87045 FECES CULTURE AEROBIC BACT: CPT

## 2023-01-05 PROCEDURE — 93922 UPR/L XTREMITY ART 2 LEVELS: CPT

## 2023-01-05 PROCEDURE — A9270 NON-COVERED ITEM OR SERVICE: HCPCS

## 2023-01-05 PROCEDURE — 93922 UPR/L XTREMITY ART 2 LEVELS: CPT | Mod: 26 | Performed by: INTERNAL MEDICINE

## 2023-01-05 PROCEDURE — 87186 SC STD MICRODIL/AGAR DIL: CPT

## 2023-01-05 PROCEDURE — 700105 HCHG RX REV CODE 258: Performed by: STUDENT IN AN ORGANIZED HEALTH CARE EDUCATION/TRAINING PROGRAM

## 2023-01-05 PROCEDURE — 82962 GLUCOSE BLOOD TEST: CPT | Mod: 91

## 2023-01-05 PROCEDURE — 87075 CULTR BACTERIA EXCEPT BLOOD: CPT

## 2023-01-05 PROCEDURE — 700101 HCHG RX REV CODE 250: Performed by: STUDENT IN AN ORGANIZED HEALTH CARE EDUCATION/TRAINING PROGRAM

## 2023-01-05 PROCEDURE — 700102 HCHG RX REV CODE 250 W/ 637 OVERRIDE(OP): Performed by: FAMILY MEDICINE

## 2023-01-05 PROCEDURE — 87205 SMEAR GRAM STAIN: CPT | Mod: 91

## 2023-01-05 PROCEDURE — 770020 HCHG ROOM/CARE - TELE (206)

## 2023-01-05 PROCEDURE — 700102 HCHG RX REV CODE 250 W/ 637 OVERRIDE(OP)

## 2023-01-05 PROCEDURE — 87899 AGENT NOS ASSAY W/OPTIC: CPT | Mod: 91

## 2023-01-05 PROCEDURE — 83036 HEMOGLOBIN GLYCOSYLATED A1C: CPT

## 2023-01-05 PROCEDURE — 87147 CULTURE TYPE IMMUNOLOGIC: CPT

## 2023-01-05 PROCEDURE — 0KBW0ZZ EXCISION OF LEFT FOOT MUSCLE, OPEN APPROACH: ICD-10-PCS | Performed by: ORTHOPAEDIC SURGERY

## 2023-01-05 PROCEDURE — 85025 COMPLETE CBC W/AUTO DIFF WBC: CPT

## 2023-01-05 PROCEDURE — 700111 HCHG RX REV CODE 636 W/ 250 OVERRIDE (IP): Performed by: STUDENT IN AN ORGANIZED HEALTH CARE EDUCATION/TRAINING PROGRAM

## 2023-01-05 PROCEDURE — 160038 HCHG SURGERY MINUTES - EA ADDL 1 MIN LEVEL 2: Performed by: ORTHOPAEDIC SURGERY

## 2023-01-05 PROCEDURE — 8968 PR NO CHARGE - PROCEDURE: Mod: 80ROC,LT | Performed by: STUDENT IN AN ORGANIZED HEALTH CARE EDUCATION/TRAINING PROGRAM

## 2023-01-05 PROCEDURE — A9270 NON-COVERED ITEM OR SERVICE: HCPCS | Performed by: FAMILY MEDICINE

## 2023-01-05 PROCEDURE — 99140 ANES COMP EMERGENCY COND: CPT | Performed by: STUDENT IN AN ORGANIZED HEALTH CARE EDUCATION/TRAINING PROGRAM

## 2023-01-05 PROCEDURE — 160027 HCHG SURGERY MINUTES - 1ST 30 MINS LEVEL 2: Performed by: ORTHOPAEDIC SURGERY

## 2023-01-05 PROCEDURE — 99223 1ST HOSP IP/OBS HIGH 75: CPT | Mod: 57 | Performed by: ORTHOPAEDIC SURGERY

## 2023-01-05 PROCEDURE — 700101 HCHG RX REV CODE 250: Performed by: ORTHOPAEDIC SURGERY

## 2023-01-05 PROCEDURE — 36415 COLL VENOUS BLD VENIPUNCTURE: CPT

## 2023-01-05 RX ORDER — SODIUM CHLORIDE, SODIUM LACTATE, POTASSIUM CHLORIDE, CALCIUM CHLORIDE 600; 310; 30; 20 MG/100ML; MG/100ML; MG/100ML; MG/100ML
INJECTION, SOLUTION INTRAVENOUS
Status: DISCONTINUED | OUTPATIENT
Start: 2023-01-05 | End: 2023-01-05 | Stop reason: SURG

## 2023-01-05 RX ORDER — INSULIN LISPRO 100 [IU]/ML
1-6 INJECTION, SOLUTION INTRAVENOUS; SUBCUTANEOUS
Status: DISCONTINUED | OUTPATIENT
Start: 2023-01-05 | End: 2023-01-09 | Stop reason: HOSPADM

## 2023-01-05 RX ORDER — DIPHENHYDRAMINE HYDROCHLORIDE 50 MG/ML
12.5 INJECTION INTRAMUSCULAR; INTRAVENOUS
Status: DISCONTINUED | OUTPATIENT
Start: 2023-01-05 | End: 2023-01-05 | Stop reason: HOSPADM

## 2023-01-05 RX ORDER — POTASSIUM CHLORIDE 20 MEQ/1
20 TABLET, EXTENDED RELEASE ORAL ONCE
Status: COMPLETED | OUTPATIENT
Start: 2023-01-05 | End: 2023-01-05

## 2023-01-05 RX ORDER — SODIUM CHLORIDE, SODIUM LACTATE, POTASSIUM CHLORIDE, CALCIUM CHLORIDE 600; 310; 30; 20 MG/100ML; MG/100ML; MG/100ML; MG/100ML
INJECTION, SOLUTION INTRAVENOUS CONTINUOUS
Status: DISCONTINUED | OUTPATIENT
Start: 2023-01-05 | End: 2023-01-05 | Stop reason: HOSPADM

## 2023-01-05 RX ORDER — OXYCODONE HCL 5 MG/5 ML
5 SOLUTION, ORAL ORAL
Status: DISCONTINUED | OUTPATIENT
Start: 2023-01-05 | End: 2023-01-05 | Stop reason: HOSPADM

## 2023-01-05 RX ORDER — LIDOCAINE HYDROCHLORIDE 20 MG/ML
INJECTION, SOLUTION EPIDURAL; INFILTRATION; INTRACAUDAL; PERINEURAL PRN
Status: DISCONTINUED | OUTPATIENT
Start: 2023-01-05 | End: 2023-01-05 | Stop reason: SURG

## 2023-01-05 RX ORDER — MAGNESIUM HYDROXIDE 1200 MG/15ML
LIQUID ORAL
Status: COMPLETED | OUTPATIENT
Start: 2023-01-05 | End: 2023-01-05

## 2023-01-05 RX ORDER — LINEZOLID 2 MG/ML
600 INJECTION, SOLUTION INTRAVENOUS ONCE
Status: DISCONTINUED | OUTPATIENT
Start: 2023-01-05 | End: 2023-01-06

## 2023-01-05 RX ORDER — HALOPERIDOL 5 MG/ML
1 INJECTION INTRAMUSCULAR
Status: DISCONTINUED | OUTPATIENT
Start: 2023-01-05 | End: 2023-01-05 | Stop reason: HOSPADM

## 2023-01-05 RX ORDER — POTASSIUM CHLORIDE 20 MEQ/1
40 TABLET, EXTENDED RELEASE ORAL ONCE
Status: COMPLETED | OUTPATIENT
Start: 2023-01-05 | End: 2023-01-05

## 2023-01-05 RX ORDER — MIDAZOLAM HYDROCHLORIDE 1 MG/ML
INJECTION INTRAMUSCULAR; INTRAVENOUS PRN
Status: DISCONTINUED | OUTPATIENT
Start: 2023-01-05 | End: 2023-01-05 | Stop reason: SURG

## 2023-01-05 RX ORDER — LABETALOL HYDROCHLORIDE 5 MG/ML
5 INJECTION, SOLUTION INTRAVENOUS
Status: DISCONTINUED | OUTPATIENT
Start: 2023-01-05 | End: 2023-01-05 | Stop reason: HOSPADM

## 2023-01-05 RX ORDER — HYDRALAZINE HYDROCHLORIDE 20 MG/ML
5 INJECTION INTRAMUSCULAR; INTRAVENOUS
Status: DISCONTINUED | OUTPATIENT
Start: 2023-01-05 | End: 2023-01-05 | Stop reason: HOSPADM

## 2023-01-05 RX ORDER — ONDANSETRON 2 MG/ML
4 INJECTION INTRAMUSCULAR; INTRAVENOUS
Status: DISCONTINUED | OUTPATIENT
Start: 2023-01-05 | End: 2023-01-05 | Stop reason: HOSPADM

## 2023-01-05 RX ORDER — OXYCODONE HCL 5 MG/5 ML
10 SOLUTION, ORAL ORAL
Status: DISCONTINUED | OUTPATIENT
Start: 2023-01-05 | End: 2023-01-05 | Stop reason: HOSPADM

## 2023-01-05 RX ORDER — AMPICILLIN AND SULBACTAM 2; 1 G/1; G/1
INJECTION, POWDER, FOR SOLUTION INTRAMUSCULAR; INTRAVENOUS PRN
Status: DISCONTINUED | OUTPATIENT
Start: 2023-01-05 | End: 2023-01-05

## 2023-01-05 RX ADMIN — AMPICILLIN AND SULBACTAM 3 G: 1; 2 INJECTION, POWDER, FOR SOLUTION INTRAMUSCULAR; INTRAVENOUS at 19:42

## 2023-01-05 RX ADMIN — MIDAZOLAM HYDROCHLORIDE 2 MG: 1 INJECTION, SOLUTION INTRAMUSCULAR; INTRAVENOUS at 19:26

## 2023-01-05 RX ADMIN — LINEZOLID 600 MG: 600 INJECTION, SOLUTION INTRAVENOUS at 04:35

## 2023-01-05 RX ADMIN — SODIUM CHLORIDE, POTASSIUM CHLORIDE, SODIUM LACTATE AND CALCIUM CHLORIDE: 600; 310; 30; 20 INJECTION, SOLUTION INTRAVENOUS at 19:26

## 2023-01-05 RX ADMIN — POTASSIUM CHLORIDE 40 MEQ: 1500 TABLET, EXTENDED RELEASE ORAL at 07:39

## 2023-01-05 RX ADMIN — LIDOCAINE HYDROCHLORIDE 100 MG: 20 INJECTION, SOLUTION EPIDURAL; INFILTRATION; INTRACAUDAL at 19:30

## 2023-01-05 RX ADMIN — AMPICILLIN AND SULBACTAM 3 G: 1; 2 INJECTION, POWDER, FOR SOLUTION INTRAMUSCULAR; INTRAVENOUS at 00:00

## 2023-01-05 RX ADMIN — OXYCODONE HYDROCHLORIDE 5 MG: 5 TABLET ORAL at 09:02

## 2023-01-05 RX ADMIN — PROPOFOL 150 MG: 10 INJECTION, EMULSION INTRAVENOUS at 19:30

## 2023-01-05 RX ADMIN — POTASSIUM CHLORIDE 20 MEQ: 1500 TABLET, EXTENDED RELEASE ORAL at 11:35

## 2023-01-05 RX ADMIN — AMPICILLIN AND SULBACTAM 3 G: 1; 2 INJECTION, POWDER, FOR SOLUTION INTRAMUSCULAR; INTRAVENOUS at 11:36

## 2023-01-05 RX ADMIN — AMPICILLIN AND SULBACTAM 3 G: 1; 2 INJECTION, POWDER, FOR SOLUTION INTRAMUSCULAR; INTRAVENOUS at 05:45

## 2023-01-05 ASSESSMENT — COGNITIVE AND FUNCTIONAL STATUS - GENERAL
SUGGESTED CMS G CODE MODIFIER MOBILITY: CJ
DAILY ACTIVITIY SCORE: 21
TOILETING: A LITTLE
SUGGESTED CMS G CODE MODIFIER DAILY ACTIVITY: CJ
MOVING FROM LYING ON BACK TO SITTING ON SIDE OF FLAT BED: A LITTLE
MOVING TO AND FROM BED TO CHAIR: A LITTLE
MOVING TO AND FROM BED TO CHAIR: A LITTLE
STANDING UP FROM CHAIR USING ARMS: A LITTLE
SUGGESTED CMS G CODE MODIFIER DAILY ACTIVITY: CH
MOVING TO AND FROM BED TO CHAIR: A LITTLE
STANDING UP FROM CHAIR USING ARMS: A LITTLE
DRESSING REGULAR UPPER BODY CLOTHING: A LITTLE
MOBILITY SCORE: 20
SUGGESTED CMS G CODE MODIFIER MOBILITY: CJ
DAILY ACTIVITIY SCORE: 24
CLIMB 3 TO 5 STEPS WITH RAILING: A LITTLE
HELP NEEDED FOR BATHING: A LITTLE
MOVING FROM LYING ON BACK TO SITTING ON SIDE OF FLAT BED: A LITTLE
MOBILITY SCORE: 20
MOBILITY SCORE: 20
MOVING FROM LYING ON BACK TO SITTING ON SIDE OF FLAT BED: A LITTLE
SUGGESTED CMS G CODE MODIFIER MOBILITY: CJ
CLIMB 3 TO 5 STEPS WITH RAILING: A LITTLE
CLIMB 3 TO 5 STEPS WITH RAILING: A LITTLE
STANDING UP FROM CHAIR USING ARMS: A LITTLE

## 2023-01-05 ASSESSMENT — LIFESTYLE VARIABLES
ALCOHOL_USE: NO
EVER FELT BAD OR GUILTY ABOUT YOUR DRINKING: NO
HAVE YOU EVER FELT YOU SHOULD CUT DOWN ON YOUR DRINKING: NO
TOTAL SCORE: 0
TOTAL SCORE: 0
CONSUMPTION TOTAL: NEGATIVE
ON A TYPICAL DAY WHEN YOU DRINK ALCOHOL HOW MANY DRINKS DO YOU HAVE: 0
TOTAL SCORE: 0
AVERAGE NUMBER OF DAYS PER WEEK YOU HAVE A DRINK CONTAINING ALCOHOL: 0
REASON UNABLE TO ASSESS: NO ISSUES
EVER HAD A DRINK FIRST THING IN THE MORNING TO STEADY YOUR NERVES TO GET RID OF A HANGOVER: NO
DOES PATIENT WANT TO STOP DRINKING: NO
HOW MANY TIMES IN THE PAST YEAR HAVE YOU HAD 5 OR MORE DRINKS IN A DAY: 0
HAVE PEOPLE ANNOYED YOU BY CRITICIZING YOUR DRINKING: NO

## 2023-01-05 ASSESSMENT — PAIN DESCRIPTION - PAIN TYPE
TYPE: ACUTE PAIN
TYPE: SURGICAL PAIN

## 2023-01-05 ASSESSMENT — ENCOUNTER SYMPTOMS
WHEEZING: 0
COUGH: 0
ABDOMINAL PAIN: 0
CHILLS: 0
LOSS OF CONSCIOUSNESS: 0
FEVER: 0
SHORTNESS OF BREATH: 0
DOUBLE VISION: 0
INSOMNIA: 0
DIZZINESS: 0
HEADACHES: 0
BLURRED VISION: 0

## 2023-01-05 ASSESSMENT — PATIENT HEALTH QUESTIONNAIRE - PHQ9
2. FEELING DOWN, DEPRESSED, IRRITABLE, OR HOPELESS: NOT AT ALL
1. LITTLE INTEREST OR PLEASURE IN DOING THINGS: NOT AT ALL
SUM OF ALL RESPONSES TO PHQ9 QUESTIONS 1 AND 2: 0

## 2023-01-05 ASSESSMENT — FIBROSIS 4 INDEX: FIB4 SCORE: 1.12

## 2023-01-05 ASSESSMENT — PAIN SCALES - GENERAL: PAIN_LEVEL: 2

## 2023-01-05 NOTE — CONSULTS
LIMB PRESERVATION SERVICE CONSULT      REFERRED BY: Rosette Mata D.O.     DATE OF CONSULTATION: 1/5/2023    REASON FOR CONSULT: Left first MTH plantar wound     HISTORY OF PRESENT ILLNESS: Jimmy Amaya is a 67 y.o.  with a past medical history that includes type 2 diabetes.  Admitted 1/4/2023 for Diabetic foot infection (HCC) [E11.628, L08.9].     LPS has been consulted for plantar left first MTH wound with purulent drainage.  The ulcer started approximately 3 to 4 months ago in late September or early October 2022.  Patient states she originally developed a small blister to his left plantar first MTH.  This then ruptured and developed into a wound.  He has been providing his own wound care and felt that it was improving slowly.  He states the area began to have a callus which she has been shaving down.  Patient states that approximately just over a week ago he was in a hurry to get to work and did not notice that the lid to a Gatorade bottle had fallen into his shoe.  Patient states he has severe neuropathy bilaterally and he walked at work for 7 hours with the lid in his left shoe.  He states he then developed a worsening bullae to the forefoot of his left foot.  Due to the increasing pain erythema and edema patient presented to ED at [facility].  Patient had wound cultures there which showed positive for MRSA.  Patient was transferred to [facility] in order to be evaluated by orthopedic surgery due to severity of left foot wound.  Patient denies fevers, chills, nausea, vomiting.      IV antibiotics were started on this admission.  Infectious diseases has not consulted.  Xray completed and is negative for osteomyelitis.  MRI completed, negative for osteomyelitis. Ortho has not consulted yet. Vascular surgery not involved yet.     Diagnosed with diabetes 20 years ago, and is currently managing with metformin does not check blood sugars as he does not have a meter.  Patient states he is interested in  obtaining one.  Does have numbness to feet.  Checks checks feet periodically. Does not have diabetic shoes and inserts.  Has not had previous foot.          Smoking:   reports that he has never smoked. He has never used smokeless tobacco.    Alcohol:   reports that he does not currently use alcohol.    Drug:   reports no history of drug use.      PAST MEDICAL HISTORY:   Past Medical History:   Diagnosis Date    Diabetes (HCC)         PAST SURGICAL HISTORY:   Past Surgical History:   Procedure Laterality Date    DENTAL EXTRACTION(S)         MEDICATIONS:   Current Facility-Administered Medications   Medication Dose    insulin GLARGINE (Lantus,Semglee) injection  15 Units    And    insulin lispro (AdmeLOG,HumaLOG) injection  1-6 Units    And    dextrose 10 % BOLUS 25 g  25 g    potassium chloride SA (Kdur) tablet 20 mEq  20 mEq    acetaminophen (Tylenol) tablet 650 mg  650 mg    ampicillin/sulbactam (UNASYN) 3 g in  mL IVPB  3 g    lactated ringers infusion (BOLUS)  500 mL    lactated ringers infusion (BOLUS): BMI greater than 30  30 mL/kg (Ideal)    Linezolid (ZYVOX) premix 600 mg  600 mg    oxyCODONE immediate-release (ROXICODONE) tablet 2.5 mg  2.5 mg    Or    oxyCODONE immediate-release (ROXICODONE) tablet 5 mg  5 mg    Or    HYDROmorphone (Dilaudid) injection 0.25 mg  0.25 mg    senna-docusate (PERICOLACE or SENOKOT S) 8.6-50 MG per tablet 2 Tablet  2 Tablet    And    polyethylene glycol/lytes (MIRALAX) PACKET 1 Packet  1 Packet    And    magnesium hydroxide (MILK OF MAGNESIA) suspension 30 mL  30 mL    And    bisacodyl (DULCOLAX) suppository 10 mg  10 mg    Pharmacy Consult Request ...Pain Management Review 1 Each  1 Each       ALLERGIES:  No Known Allergies     FAMILY HISTORY:   Family History   Problem Relation Age of Onset    Cancer Father          REVIEW OF SYSTEMS:   Constitutional: Negative for chills, fever   Respiratory: Negative for cough and shortness of breath.    Cardiovascular:Negative for  chest pain, and claudication.   Gastrointestinal: Negative for constipation, diarrhea, nausea and vomiting.   Lower extremities: positive for swelling and redness  Neurological: positive for numbness to feet and lower legs  All other systems reviewed and are negative     RESULTS:     Recent Labs     01/03/23 0255 01/04/23  0136 01/05/23  0209   WBC 14.0* 13.7* 15.8*   RBC 4.88 4.70 4.56*   HEMOGLOBIN 13.6* 13.1* 12.6*   HEMATOCRIT 41.5* 40.7* 38.4*   MCV 85.0 86.6 84.2   MCH 27.9 27.9 27.6   MCHC 32.8* 32.2* 32.8*   RDW 43.3 44.1 42.4   PLATELETCT 200 212 216   MPV 10.4 10.6 10.1     Recent Labs     01/03/23 0255 01/04/23 0136 01/05/23  0209   SODIUM 132* 135 130*   POTASSIUM 3.8 3.6 3.3*   CHLORIDE 101 101 98   CO2 21 24 23   GLUCOSE 224* 183* 201*   BUN 15 11 8         ESR:     Results from last 7 days   Lab Units 01/02/23  1501   SED RATE WESTERGREN 1526 mm/hour 20       CRP:       Results from last 7 days   Lab Units 01/02/23  1501   C REACTIVE PROTEIN 4596 mg/dL 22.64*         COVID-19: Negative on 01/02/2023    Imaging:  US-ONUR SINGLE LEVEL BILAT    (Results Pending)         Xray/CT/MRI:    3 view of left foot 1/2/2023  3 views of the LEFT foot.     COMPARISON:  None     FINDINGS:     There is focal swelling over the distal forefoot with no soft tissue gas or foreign body.     There is no evidence of displaced fracture or dislocation.     There is a tiny calcific density in the soft tissues medial to the 1st metatarsal head. There is a questionable small erosion along the medial base of the left 1st proximal phalanx. There is no periostitis. There is mild joint space narrowing and   subchondral sclerosis in the 1st MTP joint and 1st IP joint.     There is a small posterior superior calcaneal enthesophyte.     IMPRESSION:     1.  There is forefoot swelling over the MTP joint regions with no soft tissue gas.  2.  Questionable small erosion at the base of the 1st proximal phalanx with a soft tissue  "calcification medial to the 1st metatarsal. Findings suggest possibility of underlying inflammatory arthropathy such as gout.  3.  No periostitis to suggest osteomyelitis.    Left foot MRI 1/3/2023  TECHNIQUE/EXAM DESCRIPTION:  MRI of the LEFT foot without contrast.     Using a bigtincan Signa 1.5 Yessica MRI scanner, T1 axial, sagittal, and coronal, fast spin-echo T2 fat-suppressed axial and coronal, and fast inversion recovery sagittal images were obtained.     COMPARISON: None.     FINDINGS:  Osseous structures/Cartilaginous surfaces: There is no evidence of fracture or marrow edema. No evidence of bony destructive change. There is degenerative change of the first MTP joint characterized by joint space loss and osteophytic spurring.     Miscellaneous: There is edema/induration of the soft tissues of the forefoot especially in the area of the great toe. There is ulceration involving the plantar surface of the foot underlying the first proximal phalanx. No soft tissue abscess.     IMPRESSION:     1.  No evidence of osteomyelitis.     2.  Cellulitis and soft tissue ulceration.        A1c:  Lab Results   Component Value Date/Time    HBA1C 9.3 (H) 01/02/2023 03:01 PM            Microbiology:  Results       ** No results found for the last 168 hours. **             PHYSICAL EXAMINATION:     VITAL SIGNS: /50   Pulse 97   Temp 38 °C (100.4 °F) (Temporal)   Resp 18   Ht 1.88 m (6' 2.02\")   Wt 116 kg (256 lb 2.8 oz)   SpO2 92%   BMI 32.88 kg/m²       General Appearance:  Well developed, well nourished, in no acute distress    Lower Extremity Assessment:    Edema:   Moderate    ROM dorsi/plantarflexion:   Dorsiflexion limited, likely due to edema    Structural /mechanical changes:    mycotic toenails    Sensory Assessment:  Monofilament testing with a 10 gram force: sensation: decreased bilaterally  Visual Inspection: Feet with maceration, ulcers, fissures.  Pedal pulses: intact bilaterally    Feet Insensate to light " touch    R foot: sensed 0/10 points  L foot: sensed 0/10 points    Pulses:  R foot: palpable DP, palpable PT  L foot: Faintly palpable DP, faintly palpable PT, likely due to edema    With doppler brisk tones noted to left PT/DP      Wound Assessment:    Wound(s)   location: Plantar left first MTH  Full thickness, does not probe to bone  Wound characteristics:  red/white tissue,  slough  Erythema: Severe  Drainage: Purulent, sanguinous  Callus: Periwound  Odor: None  Measures: 0.5 x 0.5 x 0.8    Blood and serous fluid-filled bullae to left plantar forefoot  Measures: 8.5 x 6 by MICHELINE      Wound care completed by LPS APRN.  Wound cleansed with wound cleanser, pat dry.  Apply ABD pad to plantar forefoot, secure with roll gauze, tape.  Change as needed until surgery later today.    Wound photo:               ASSESSMENT AND PLAN:   67 y.o. admitted for Diabetic foot infection (HCC) [E11.628, L08.9]. Presents with left plantar first MTH ulcer with blood in serous filled bullae to left plantar forefoot.    -X-ray and MRI negative for osteomyelitis.  -Discussed need for left first MTH/plantar forefoot I&D with patient.  Patient in agreement  -Patient has been n.p.o. since midnight, states he has had sips of water with meds.  -Discussed with Dr. García, patient to have left first MTH/plantar forefoot I&D today, 1/5/2023        Wound care:   -Wound care orders placed for nursing by LPS   -Left plantar first MTH/forefoot: Cleanse with wound cleanser, pat dry.  Apply ABD pad to plantar forefoot, secure with roll gauze, tape.  To be changed as needed for drainage until surgery later today, 1/5/2023    Imaging/Labs:  -COVID-19: not completed this admission.     Vascular status:   -  Palpable pedal pulses bilaterally      Surgery:   - consulted Ortho Dr. García. Plan for left 1st MTH/Plantar forefoot I&D today 1/5/23    Antibiotics:   -currently on antibiotics managed by hospitalist       Weight Bearing Status:   -Left foot:  Heel weight bearing    Offloading:   -Offloading shoe; ordered  -Orthotic company: None      PT/OT:   -Involved at this time    Diabetes Education:   -consult already in place for CDE   -   Lab Results   Component Value Date/Time    HBA1C 9.3 (H) 01/02/2023 03:01 PM          - Implications of loss of protective sensation (LOPS) discussed with patient- including increased risk for amputation.  Advised to check feet at least daily, moisturize feet, and to always wear protective foot wear.   -avoid trimming own nails. See podiatrist or certified foot and nail RN  -keep blood sugars <150 for improved wound healing      Smoking Cessation:   -Does not smoke          Discharge Plan:  -TBD          Follow up: wound care clinic referral in place        D/W: pt, RN, Dr. Mast, Dr García     Please note that this dictation was created using voice recognition software. I have  worked with technical experts from [facility]  [employer] to optimize the interface.  I have made every reasonable attempt to correct obvious errors, but there may be errors of grammar and possibly content that I did not discover before finalizing the note.    Please contact LPS through [employer].

## 2023-01-05 NOTE — PROGRESS NOTES
4 Eyes Skin Assessment Completed by DEBRA Babcock and DEBRA Fisher.    Head WDL  Ears WDL  Nose WDL  Mouth WDL  Neck WDL  Breast/Chest WDL  Shoulder Blades WDL  Spine WDL  (R) Arm/Elbow/Hand WDL  (L) Arm/Elbow/Hand WDL  Abdomen WDL  Groin WDL  Scrotum/Coccyx/Buttocks WDL  (R) Leg Blanching and Edema  (L) Leg Redness, Non-Blanching, Swelling, and Shiny  (R) Heel/Foot/Toe WDL  (L) Heel/Foot/Toe Redness, Ulcer(s), and Swelling          Devices In Places Blood Pressure Cuff      Interventions In Place N/A    Possible Skin Injury Yes    Pictures Uploaded Into Epic Yes  Wound Consult Placed Yes  RN Wound Prevention Protocol Ordered Yes

## 2023-01-05 NOTE — PLAN OF CARE (IOPOC)
"[facility] TRIAGE OFFICER DIRECT ADMISSION REPORT  Transferring facility: [facility]  Transferring physician: Dr Whalen  Transferring facility/physician contact number:   Chief complaint: Foot pain  Pertinent history & patient course:   Per Dr Whalen notes:  \"Jimmy Amyaa is a 67 y.o. male with a past medical history of poorly controlled diabetes who presented 1/2/2023 with progressively worsening foot pain redness and swelling.  Patient reports that he has an ulcer of the left foot that has not been healing well over the past 1 month.  However, over the past 4 days he has been having progressively worsening pain redness swelling from the ulcer.  The pain was initially mild but now is severe rated as 8/10.  The pain is worse with weightbearing and with walking.  The pain does not radiate to other places.  No relieving factors other than pain medications.  He also reports generalized weakness and chills but denies having fevers.\"    Patient with negative MRI for osteo but with purulent discharge from foot. Needing to be evaluated by LPS.     Pertinent imaging & lab results: MRI negative osteo  Code Status: Full per transferring provider, I personally verified with the transferring provider patient's code status and the transferring provider has confirmed this with the patient.  Further work up or recommendations per triage officer prior to transfer:   Consultants called prior to transfer and pertinent input from consultants:   Patient accepted for transfer: Yes  Consultants to be called upon arrival: LPS  Admission status: Inpatient.   Floor requested: Med Surg  If ICU transfer, name of intensivist case discussed with and pertinent input from critical care:     Please inform the triage officer upon arrival of the patient to [facility] for assignment of a hospitalist to perform admission.     For any question or concerns regarding the care of this patient, please reach out to the assigned " hospitalist.

## 2023-01-05 NOTE — ASSESSMENT & PLAN NOTE
Not on home med, pt states ran out of metformin. Last A1C 9.3% on 1/2/22    - insulin glargine 15 U   - sliding scale insulin   - hypoglycemia protocol  - diabetic education

## 2023-01-05 NOTE — CARE PLAN
The patient is Stable - Low risk of patient condition declining or worsening    Shift Goals: Pending ONUR; Pending I&D of left foot; IV antibiotics; wound care  Clinical Goals: ambulate to the restroom  Patient Goals: increase in comfort  Family Goals: n/a    Progress made toward(s) clinical / shift goals:  see above    Problem: Knowledge Deficit - Standard  Goal: Patient and family/care givers will demonstrate understanding of plan of care, disease process/condition, diagnostic tests and medications  Outcome: Progressing     Problem: Hemodynamics  Goal: Patient's hemodynamics, fluid balance and neurologic status will be stable or improve  Outcome: Progressing     Problem: Fluid Volume  Goal: Fluid volume balance will be maintained  Outcome: Progressing     Problem: Urinary - Renal Perfusion  Goal: Ability to achieve and maintain adequate renal perfusion and functioning will improve  Outcome: Progressing     Problem: Mechanical Ventilation  Goal: Safe management of artificial airway and ventilation  Outcome: Progressing       Patient is not progressing towards the following goals:      Problem: Pain - Standard  Goal: Alleviation of pain or a reduction in pain to the patient’s comfort goal  Outcome: Not Progressing

## 2023-01-05 NOTE — H&P
[facility] Internal Medicine History & Physical Note    Date of Service  1/4/2023    [facility] Team: JHON   Attending: Luciano Weber M.d.  Senior Resident: Dr. Rosette Mata  Contact Number: [phone]    Primary Care Physician  Channing Garcia M.D.    Consultants  LPS    Code Status  Full Code    Chief Complaint  No chief complaint on file.    History of Presenting Illness (HPI):   Jimmy Amaya is a 67 y.o. male with past medical history of uncontrolled diabetes (pt states ran out of metformin) who presented 1/4/2023 as a transfer from [facility] for L diabetic foot ulcer requiring LPS service.    Patient has a left foot ulcer that started a month ago and has not been healing and states over the past 4 days had worsening pain, erythema, swelling, along with purulent drainage, prompting him to seek medical attention. Patient was admitted to [facility] for sepsis secondary to left foot ulcer/cellulitis with superficial wound cultures growing MRSA. Blood culture no growth to date, MRSA nares negative. MRI did not demonstrate abscess or osteomyelitis. Patient was placed on linezolid and unasyn and ortho  was consulted who recommended patient to be transferred to [facility] [place] for LPS.    Patient currently complaining of non radiation left foot pain that is 4/10 in severity, worsened with weightbearing and walking. Denies any fever, chills, abdominal pain, SOB, chest pain, abd pain, nausea, vomiting.     I discussed the plan of care with patient.    Review of Systems  Review of Systems   Constitutional:  Negative for chills and fever.   HENT: Negative.     Eyes: Negative.    Respiratory:  Negative for cough, sputum production and shortness of breath.    Cardiovascular:  Positive for leg swelling. Negative for chest pain and palpitations.   Gastrointestinal:  Negative for abdominal pain, nausea and vomiting.   Genitourinary: Negative.    Musculoskeletal:         + foot pain   Skin:  Negative for itching and  rash.   Neurological:  Negative for sensory change, focal weakness, weakness and headaches.     Past Medical History   has a past medical history of Diabetes (HCC).    Surgical History   has a past surgical history that includes dental extraction(s).     Family History  family history includes Cancer in his father.   Family history reviewed with patient.     Social History  Tobacco: denies  Alcohol: denies  Recreational drugs (illegal or prescription): denies  Living Situation: lives alone  Primary Care Provider: Reviewed      Allergies  No Known Allergies    Medications  None       Physical Exam  Temp:  [36.9 °C (98.5 °F)-38.5 °C (101.3 °F)] 38.5 °C (101.3 °F)  Pulse:  [] 101  Resp:  [15-20] 18  BP: (118-158)/(63-78) 148/65  SpO2:  [90 %-96 %] 90 %  Blood Pressure : (!) 148/65   Temperature: (!) 38.5 °C (101.3 °F)   Pulse: (!) 101   Respiration: 18   Pulse Oximetry: 90 %       Physical Exam  Constitutional:       General: He is not in acute distress.     Appearance: Normal appearance.   HENT:      Head: Normocephalic and atraumatic.      Right Ear: External ear normal.      Left Ear: External ear normal.      Nose: Nose normal.   Eyes:      General: No scleral icterus.     Extraocular Movements: Extraocular movements intact.      Conjunctiva/sclera: Conjunctivae normal.   Cardiovascular:      Rate and Rhythm: Normal rate and regular rhythm.      Heart sounds: Normal heart sounds. No murmur heard.  Pulmonary:      Effort: No respiratory distress.      Breath sounds: Normal breath sounds. No wheezing, rhonchi or rales.   Abdominal:      General: There is no distension.      Palpations: Abdomen is soft.      Tenderness: There is no abdominal tenderness. There is no guarding or rebound.   Musculoskeletal:         General: Swelling and tenderness present.      Right lower leg: No edema.      Left lower leg: Edema present.   Skin:     General: Skin is warm and dry.      Comments: Wound to plantar surface of left  foot proximal of left greater toe, purulent drainage soaking through dressing, surrounded by erythema, edema, and tenderness extending to dorsal surface of left foot, beyond original border marked by marker. 1+ swelling to affected area.    Neurological:      General: No focal deficit present.      Mental Status: He is alert and oriented to person, place, and time.      Sensory: No sensory deficit.      Motor: No weakness.   Psychiatric:         Mood and Affect: Mood normal.         Behavior: Behavior normal.       Laboratory:  Recent Labs     01/02/23  1501 01/03/23  0255 01/04/23 0136   WBC 18.9* 14.0* 13.7*   RBC 5.52 4.88 4.70   HEMOGLOBIN 15.5 13.6* 13.1*   HEMATOCRIT 47.0 41.5* 40.7*   MCV 85.1 85.0 86.6   MCH 28.1 27.9 27.9   MCHC 33.0* 32.8* 32.2*   RDW 43.8 43.3 44.1   PLATELETCT 221 200 212   MPV 10.2 10.4 10.6     Recent Labs     01/02/23  1501 01/03/23  0255 01/04/23  0136   SODIUM 130* 132* 135   POTASSIUM 4.3 3.8 3.6   CHLORIDE 97 101 101   CO2 22 21 24   GLUCOSE 257* 224* 183*   BUN 19 15 11   CREATININE 0.98 0.72 0.64   CALCIUM 9.1 8.4 8.3*     Recent Labs     01/02/23  1501 01/03/23  0255 01/04/23  0136   ALTSGPT  --  13  --    ASTSGOT  --  13  --    ALKPHOSPHAT  --  84  --    TBILIRUBIN  --  1.5  --    GLUCOSE 257* 224* 183*         Recent Labs     01/04/23 0136   NTPROBNP 458*         No results for input(s): TROPONINT in the last 72 hours.    Imaging:  US-EXTREMITY ARTERY LOWER BILAT W/ONUR (COMBO)    (Results Pending)     X-Ray:  I have personally reviewed the images and compared with prior images.    Assessment/Plan:  I anticipate this patient will require at least two midnights for appropriate medical management, necessitating inpatient admission because need for LPS surface, IV antibiotics    Patient will need a MED/SURG bed on MEDICAL service .  The need is secondary for IV antibiotics.    Left diabetic foot ulcer and infection- (present on admission)  Assessment & Plan  Elevated crp during  admission at [facility] prior to transfer  MRI negative for osteomyelitis   - transferred from [facility] for LPS, per ortho recs  - LPS consulted   - holding chemical dvt ppx in setting of pending evaluation for possible intervention or I/D.  - Wound care consulted  - wound culture + MRSA. Continue unasyn and linezolid  - leukocytosis improving  - blood cultures NGTD      Type 2 diabetes mellitus with circulatory disorder, without long-term current use of insulin (HCC)- (present on admission)  Assessment & Plan  Not on home med, pt states ran out of metformin   Last A1C 9.3% on 1/2/22  - insulin glargine 10 U   - sliding scale insulin   - hypoglycemia protocol  - diabetic education     Sepsis (HCC)- (present on admission)  Assessment & Plan  Present on admission at [facility]  Source: Left diabetic foot ulcer  WBC downtrending with IV antibiotics  Afebrile    Hyponatremia- (present on admission)  Assessment & Plan  Na 130 on presentation, improved with NS infusion  - now resolved, 135 this AM  - will discontinue fluids        VTE prophylaxis: SCDs/TEDs  Hold chemical DVT ppx in setting of possible intervention/procedure/I/D pending further evaluation

## 2023-01-05 NOTE — DISCHARGE PLANNING
"Case Management Discharge Planning    Admission Date: 1/4/2023  GMLOS: 3.9  ALOS: 1    6-Clicks ADL Score: 24  6-Clicks Mobility Score: 20      Anticipated Discharge Dispo:      DME Needed: Yes likely - surgical intervention pending to foot ulcer.  DME Ordered: No    Action(s) Taken: Updated Provider/Nurse on Discharge Plan    Escalations Completed: None    Medically Clear: No    Next Steps: I&D today foot    Barriers to Discharge: None    Is the patient up for discharge tomorrow: No  Visited w/ patient @bedside.  He reports is very active hiking 8 miles/day- thus diabetic foot ulcer developed.  He reports being overwhelmed in the past with recommendations related to Diabetes and has been dismissing concerns.  His only anti-diabetic home med was Glucophage but has not filled Rx of late.  He states his goal is to \"get the foot fixed then make sure this never happens again.\" He reports having several books on Diabetes @ home, and this is a motivator to get DM under control. He plans to ask his Dtr to be available on DC day to help him transition home and to [place]- where he will move. His PCP is Dr Garcia.  Pt asks that sister Charline Yu be taken off Em Contact list and replace w/ daughter Gabby White [phone] be added instead. Done so @ this time and staff informed via Voalte.   With  Pt. Permission, return call placed to Dtr Gabby who confirms pt's plan to assist pt in the move to live w/ her and her spouse in [facility].   Informed Dtr of pending I&D of foot and his DC plan to move to [facility].  Provided Dtr w/ CM contact info @ this time.     DC plan: Pt is aware that offloading of affected foot is likely recommended w/ outpt follow up till foot is healed.   CM will cont to follow as needed.           "

## 2023-01-05 NOTE — PROGRESS NOTES
Pt has wound with ulceration to left 1st MH with forefoot bullae/blood filled, xray and MRI negative for osteo with brisk pulses. Patient has been NPO. Dr García updated, pt to have left foot I&D today.   Patient in agreement.   Bedside RN and Dr Mast updated.     Full consult note to follow.

## 2023-01-05 NOTE — PROGRESS NOTES
Telemetry Report:    Rhythm: NSR, sinus tach  Heart Rate: 102 bpm   Ectopy:    VT: 0.189 sec  QRS: 0.093 sec  QT: 0.334 sec

## 2023-01-05 NOTE — CARE PLAN
Problem: Knowledge Deficit - Standard  Goal: Patient and family/care givers will demonstrate understanding of plan of care, disease process/condition, diagnostic tests and medications  Outcome: Progressing   Note: education regarding importance of maintaining acceptable blood glucose range and management of DM.    Problem: Pain - Standard  Goal: Alleviation of pain or a reduction in pain to the patient’s comfort goal  Outcome: Progressing   The patient is Stable - Low risk of patient condition declining or worsening     Note: increase in activity and decreasing pain.

## 2023-01-05 NOTE — WOUND TEAM
Wound consult received regarding patient left plantar foot diabetic ulcer. Patient was seen by this wound RN yesterday on 1/4/23 at [facility]. Dressing care orders were placed and are still in place for bedside nursing. Patient to be seen by LPS and Orthopedics. Please refer to the note written 1/4/23 at 1127 under the [facility] encounter for further details. Wound team not following at this time, waiting for LPS recommendations should there be any changes in the plan of care. Please call for questions or concerns.

## 2023-01-05 NOTE — PROGRESS NOTES
Pt discharged to [facility] with MD order. Report given to ICU RN. Copies of discharge papers given, all questions answered.  Transported via [employer] with [employer].

## 2023-01-05 NOTE — ASSESSMENT & PLAN NOTE
Criteria: febrile 101.3, WBC=18.9, tachycardia 101, Clear source of Left diabetic foot ulcer,  Present on admission at [facility].     -Afebrile 1/7/23. Leukocytosis finally (1/7/23) trending down 9.7. Source control underway  -Antibiotics: Unasyn, Linezolid (MRSA positive cultures). Continuing as likely a polymicrobial wound, technically without source control and with potential future procedures.

## 2023-01-05 NOTE — DIETARY
Nutrition Services: Diabetes Education Consult   Day 1 of admit.  Jimmy Amaya is a 67 y.o. male with admitting DX of Diabetic foot infection (HCC) [E11.628, L08.9]    RD able to visit pt at bedside to provide diabetes education. Pt accepted type 2 Diabetes booklet, politely declined explanation from RD. RD able to answer all questions to patient's satisfaction.     No other education needs identified at this time. Consider referral to outpatient nutrition services for continuation of education as indicated or per pt preferences.     Please re-consult RD as indicated.

## 2023-01-06 ENCOUNTER — APPOINTMENT (OUTPATIENT)
Dept: RADIOLOGY | Facility: MEDICAL CENTER | Age: 68
DRG: 854 | End: 2023-01-06
Payer: MEDICARE

## 2023-01-06 LAB
ANION GAP SERPL CALC-SCNC: 9 MMOL/L (ref 7–16)
BACTERIA WND AEROBE CULT: ABNORMAL
BASOPHILS # BLD AUTO: 0.3 % (ref 0–1.8)
BASOPHILS # BLD: 0.04 K/UL (ref 0–0.12)
BUN SERPL-MCNC: 6 MG/DL (ref 8–22)
CALCIUM SERPL-MCNC: 8.1 MG/DL (ref 8.5–10.5)
CHLORIDE SERPL-SCNC: 98 MMOL/L (ref 96–112)
CO2 SERPL-SCNC: 24 MMOL/L (ref 20–33)
CREAT SERPL-MCNC: 0.59 MG/DL (ref 0.5–1.4)
E COLI SXT1+2 STL IA: NORMAL
EOSINOPHIL # BLD AUTO: 0.06 K/UL (ref 0–0.51)
EOSINOPHIL NFR BLD: 0.4 % (ref 0–6.9)
ERYTHROCYTE [DISTWIDTH] IN BLOOD BY AUTOMATED COUNT: 42.6 FL (ref 35.9–50)
GFR SERPLBLD CREATININE-BSD FMLA CKD-EPI: 107 ML/MIN/1.73 M 2
GLUCOSE BLD STRIP.AUTO-MCNC: 182 MG/DL (ref 65–99)
GLUCOSE BLD STRIP.AUTO-MCNC: 183 MG/DL (ref 65–99)
GLUCOSE BLD STRIP.AUTO-MCNC: 185 MG/DL (ref 65–99)
GLUCOSE BLD STRIP.AUTO-MCNC: 187 MG/DL (ref 65–99)
GLUCOSE BLD STRIP.AUTO-MCNC: 213 MG/DL (ref 65–99)
GLUCOSE SERPL-MCNC: 208 MG/DL (ref 65–99)
GRAM STN SPEC: ABNORMAL
GRAM STN SPEC: ABNORMAL
HCT VFR BLD AUTO: 36.9 % (ref 42–52)
HGB BLD-MCNC: 12.2 G/DL (ref 14–18)
IMM GRANULOCYTES # BLD AUTO: 0.16 K/UL (ref 0–0.11)
IMM GRANULOCYTES NFR BLD AUTO: 1.1 % (ref 0–0.9)
LYMPHOCYTES # BLD AUTO: 1.16 K/UL (ref 1–4.8)
LYMPHOCYTES NFR BLD: 8.2 % (ref 22–41)
MAGNESIUM SERPL-MCNC: 1.8 MG/DL (ref 1.5–2.5)
MCH RBC QN AUTO: 28.2 PG (ref 27–33)
MCHC RBC AUTO-ENTMCNC: 33.1 G/DL (ref 33.7–35.3)
MCV RBC AUTO: 85.2 FL (ref 81.4–97.8)
MONOCYTES # BLD AUTO: 1.23 K/UL (ref 0–0.85)
MONOCYTES NFR BLD AUTO: 8.7 % (ref 0–13.4)
NEUTROPHILS # BLD AUTO: 11.44 K/UL (ref 1.82–7.42)
NEUTROPHILS NFR BLD: 81.3 % (ref 44–72)
NRBC # BLD AUTO: 0 K/UL
NRBC BLD-RTO: 0 /100 WBC
PLATELET # BLD AUTO: 209 K/UL (ref 164–446)
PMV BLD AUTO: 10 FL (ref 9–12.9)
POTASSIUM SERPL-SCNC: 3.6 MMOL/L (ref 3.6–5.5)
RBC # BLD AUTO: 4.33 M/UL (ref 4.7–6.1)
SIGNIFICANT IND 70042: ABNORMAL
SIGNIFICANT IND 70042: ABNORMAL
SIGNIFICANT IND 70042: NORMAL
SITE SITE: ABNORMAL
SITE SITE: ABNORMAL
SITE SITE: NORMAL
SODIUM SERPL-SCNC: 131 MMOL/L (ref 135–145)
SOURCE SOURCE: ABNORMAL
SOURCE SOURCE: ABNORMAL
SOURCE SOURCE: NORMAL
WBC # BLD AUTO: 14.1 K/UL (ref 4.8–10.8)

## 2023-01-06 PROCEDURE — A9270 NON-COVERED ITEM OR SERVICE: HCPCS | Performed by: STUDENT IN AN ORGANIZED HEALTH CARE EDUCATION/TRAINING PROGRAM

## 2023-01-06 PROCEDURE — 700102 HCHG RX REV CODE 250 W/ 637 OVERRIDE(OP)

## 2023-01-06 PROCEDURE — 99232 SBSQ HOSP IP/OBS MODERATE 35: CPT

## 2023-01-06 PROCEDURE — 700101 HCHG RX REV CODE 250

## 2023-01-06 PROCEDURE — 85025 COMPLETE CBC W/AUTO DIFF WBC: CPT

## 2023-01-06 PROCEDURE — A9270 NON-COVERED ITEM OR SERVICE: HCPCS

## 2023-01-06 PROCEDURE — 82962 GLUCOSE BLOOD TEST: CPT

## 2023-01-06 PROCEDURE — 700105 HCHG RX REV CODE 258: Performed by: FAMILY MEDICINE

## 2023-01-06 PROCEDURE — 700111 HCHG RX REV CODE 636 W/ 250 OVERRIDE (IP): Performed by: FAMILY MEDICINE

## 2023-01-06 PROCEDURE — 700102 HCHG RX REV CODE 250 W/ 637 OVERRIDE(OP): Performed by: STUDENT IN AN ORGANIZED HEALTH CARE EDUCATION/TRAINING PROGRAM

## 2023-01-06 PROCEDURE — 36415 COLL VENOUS BLD VENIPUNCTURE: CPT

## 2023-01-06 PROCEDURE — 83735 ASSAY OF MAGNESIUM: CPT

## 2023-01-06 PROCEDURE — 700105 HCHG RX REV CODE 258: Performed by: STUDENT IN AN ORGANIZED HEALTH CARE EDUCATION/TRAINING PROGRAM

## 2023-01-06 PROCEDURE — 87040 BLOOD CULTURE FOR BACTERIA: CPT | Mod: 91

## 2023-01-06 PROCEDURE — 80048 BASIC METABOLIC PNL TOTAL CA: CPT

## 2023-01-06 PROCEDURE — 700111 HCHG RX REV CODE 636 W/ 250 OVERRIDE (IP): Performed by: STUDENT IN AN ORGANIZED HEALTH CARE EDUCATION/TRAINING PROGRAM

## 2023-01-06 PROCEDURE — 770020 HCHG ROOM/CARE - TELE (206)

## 2023-01-06 PROCEDURE — 99233 SBSQ HOSP IP/OBS HIGH 50: CPT | Mod: GC | Performed by: STUDENT IN AN ORGANIZED HEALTH CARE EDUCATION/TRAINING PROGRAM

## 2023-01-06 RX ORDER — LOPERAMIDE HYDROCHLORIDE 2 MG/1
2 CAPSULE ORAL 4 TIMES DAILY PRN
Status: DISCONTINUED | OUTPATIENT
Start: 2023-01-06 | End: 2023-01-09 | Stop reason: HOSPADM

## 2023-01-06 RX ORDER — LINEZOLID 600 MG/1
600 TABLET, FILM COATED ORAL EVERY 12 HOURS
Status: DISCONTINUED | OUTPATIENT
Start: 2023-01-06 | End: 2023-01-08

## 2023-01-06 RX ORDER — POTASSIUM CHLORIDE 20 MEQ/1
40 TABLET, EXTENDED RELEASE ORAL ONCE
Status: COMPLETED | OUTPATIENT
Start: 2023-01-06 | End: 2023-01-06

## 2023-01-06 RX ORDER — SODIUM HYPOCHLORITE 1.25 MG/ML
SOLUTION TOPICAL DAILY
Status: DISCONTINUED | OUTPATIENT
Start: 2023-01-06 | End: 2023-01-09 | Stop reason: HOSPADM

## 2023-01-06 RX ADMIN — AMPICILLIN AND SULBACTAM 3 G: 1; 2 INJECTION, POWDER, FOR SOLUTION INTRAMUSCULAR; INTRAVENOUS at 18:25

## 2023-01-06 RX ADMIN — INSULIN LISPRO 1 UNITS: 100 INJECTION, SOLUTION INTRAVENOUS; SUBCUTANEOUS at 21:09

## 2023-01-06 RX ADMIN — LINEZOLID 600 MG: 600 TABLET, FILM COATED ORAL at 18:24

## 2023-01-06 RX ADMIN — LOPERAMIDE HYDROCHLORIDE 2 MG: 2 CAPSULE ORAL at 18:24

## 2023-01-06 RX ADMIN — INSULIN LISPRO 1 UNITS: 100 INJECTION, SOLUTION INTRAVENOUS; SUBCUTANEOUS at 18:28

## 2023-01-06 RX ADMIN — AMPICILLIN AND SULBACTAM 3 G: 1; 2 INJECTION, POWDER, FOR SOLUTION INTRAMUSCULAR; INTRAVENOUS at 04:50

## 2023-01-06 RX ADMIN — AMPICILLIN AND SULBACTAM 3 G: 1; 2 INJECTION, POWDER, FOR SOLUTION INTRAMUSCULAR; INTRAVENOUS at 00:17

## 2023-01-06 RX ADMIN — POTASSIUM CHLORIDE 40 MEQ: 1500 TABLET, EXTENDED RELEASE ORAL at 09:43

## 2023-01-06 RX ADMIN — LINEZOLID 600 MG: 600 INJECTION, SOLUTION INTRAVENOUS at 05:33

## 2023-01-06 RX ADMIN — LOPERAMIDE HYDROCHLORIDE 2 MG: 2 CAPSULE ORAL at 09:43

## 2023-01-06 RX ADMIN — DAKIN'S SOLUTION 0.125% (QUARTER STRENGTH) 473 ML: 0.12 SOLUTION at 09:43

## 2023-01-06 RX ADMIN — AMPICILLIN AND SULBACTAM 3 G: 1; 2 INJECTION, POWDER, FOR SOLUTION INTRAMUSCULAR; INTRAVENOUS at 13:07

## 2023-01-06 RX ADMIN — AMPICILLIN AND SULBACTAM 3 G: 1; 2 INJECTION, POWDER, FOR SOLUTION INTRAMUSCULAR; INTRAVENOUS at 23:09

## 2023-01-06 ASSESSMENT — ENCOUNTER SYMPTOMS
SHORTNESS OF BREATH: 0
NERVOUS/ANXIOUS: 1
BLURRED VISION: 0
CHILLS: 0
INSOMNIA: 0
LOSS OF CONSCIOUSNESS: 0
DEPRESSION: 1
FEVER: 0
DIZZINESS: 0
WHEEZING: 0
HEADACHES: 0
COUGH: 0
ABDOMINAL PAIN: 0
DOUBLE VISION: 0

## 2023-01-06 NOTE — OP REPORT
DATE: 1/5/2023    PREOPERATIVE DIAGNOSIS: Left foot abscess    POSTOPERATIVE DIAGNOSIS: Same    PROCEDURE PERFORMED:  Irrigation and debridement of  left foot abscess                                                      SURGEON: Giancarlo García M.D.     ASSISTANT: Chris Paz MD    ANESTHESIA: General    ESTIMATED BLOOD LOSS: 0 mL    INDICATIONS: The patient is a 67 y.o. male with a left foot abscess after long standing plantar ulcer.  I discussed the risks and benefits of the procedure, including the risks of infection, wound healing complication, neurovascular injury, need for repeat irrigation and debridement and the medical risks of anesthesia including DVT, PE, MI, stroke, and death.  Benefits include eradication of infection and resolution of sepsis.  Alternatives to surgery were also discussed, including non-operative management.  The patient signed the informed consent and the operative extremity was marked.      PROCEDURE:  The patient underwent anesthesia, and was positioned supine on the operating room table and all bony prominences were well padded.  Preoperative antibiotics were held until cultures could be obtained. Sequential compression devices were employed. The correct operative site was prepped and draped into a sterile field. A procedural pause was conducted to verify correct patient, correct extremity, presence of the surgeons initials on the operative extremity.    A 5 cm incision was made over the medial foot. A large amount of purulent fluid was encountered and sent to the lab for culture analysis. The abscess cavity was debrided in excisional fashion with knife and rongeur of skin, subcutaneous tissue, and underlying muscle down to healthy tissue. The  wound was irrigated with 3 L of saline solution. The wound was closed with 2-0 Nylon in the skin.  Sterile dressings were applied.     The patient tolerated the procedure well. There were no apparent complications. All sponge,  needle, and instrument counts were correct on two separate occasions. They were awakened, extubated, and transferred to the recovery room in satisfactory condition.     The use of Chris Paz as a surgical assistant was necessary for assistance with exposure, retraction, and closure.    Post-Operative Plan:    1.  The patient should remain full weightbearing through heel  on their operative extremity.  Gait aids (crutch or crutches, cane, walker) may be used as needed, and may be discontinued when no longer required.  2.  IV antibiotics - will be given postoperatively and adjusted by the Infectious Disease service as dictated by culture results.  3.  DVT prophylaxis - SCD's and Lovenox 40 mg SQ daily while inpatient.  The patient may transition to Aspirin 325 mg PO BID as an outpatient  4.  Will need return to OR for I&D vs TMA   ____________________________________   Giancarlo García M.D.   DD: 1/5/2023  7:57 PM

## 2023-01-06 NOTE — OR NURSING
Patient arrived to PACU via bed at 2001 with siderails up x4, brake locked upon arrival. Oral airway in place. Received report from Dr. Ojeda and CARTER Peace  RN, assumed care of patient. Left foot dressing CDI. Foot elevated on pillows, ice pack applied. Left toes cap refill less than 3. Zyvox infusing. Rouses to voice at 2020, oral airway removed. . Oriented x 4 at 2025, denies pain or nausea. Tolerating sips of water at 2030. Patient resting comfortably at 2040, continues to deny pain. Left foot dressing remains CDI. Vital signs stable. Report to Swetha GAINES RN at 2050. Discharged to room S150.

## 2023-01-06 NOTE — DISCHARGE PLANNING
Rec'd request for Application for Medicaid.   Call to [facility] Pt Financial Assistance X [phone].  PFA Rep will see pt today or tomorrow to assist w/ application.   Noted: Per MD foot ulcer is severe and may require surgical intervention.

## 2023-01-06 NOTE — PROGRESS NOTES
Send request for case management to speak with patient in regards to Medicaid.    Sent request to physician for imodium. Patient states he has chronic diarrhea.

## 2023-01-06 NOTE — ANESTHESIA POSTPROCEDURE EVALUATION
Patient: Jimmy Amaya    Procedure Summary     Date: 01/05/23 Room / Location: [place] [address] / SURGERY [facility]    Anesthesia Start: 1926 Anesthesia Stop: 2004    Procedure: INCISION AND DRAINAGE, WOUND, BY ORTHOPEDICS - FOOT (Left: Foot) Diagnosis: (left foot abscess)    Surgeons: Giancarlo García M.D. Responsible Provider: Chris Ojeda M.D.    Anesthesia Type: general ASA Status: 3 - Emergent          Final Anesthesia Type: general  Last vitals  BP   Blood Pressure : (!) 149/72    Temp   36.8 °C (98.2 °F)    Pulse   (!) 107   Resp   18    SpO2   91 %      Anesthesia Post Evaluation    Patient location during evaluation: PACU  Patient participation: complete - patient participated  Level of consciousness: sleepy but conscious  Pain score: 2    Airway patency: patent  Anesthetic complications: no  Cardiovascular status: hemodynamically stable  Respiratory status: acceptable  Hydration status: euvolemic    PONV: none          No notable events documented.

## 2023-01-06 NOTE — PROGRESS NOTES
"[facility] Internal Medicine Daily Progress Note    Date of Service  1/6/2023    [facility] Team: R IM Green Team   Attending: Skinny Diggs M.d.  Senior Resident: Dr. Benavidez  Intern:  Dr. Mast  Contact Number: [phone]    Chief Complaint  Jimmy Amaya is a 67 y.o. male admitted 1/4/2023 with Sepsis secondary to left diabetic foot ulcer    Hospital Course  Jimmy Amaya is a 67 y.o. male with past medical history of uncontrolled diabetes (pt states ran out of metformin) who presented 1/4/2023 as a transfer from [facility] for L diabetic foot ulcer requiring LPS service.  Patient was placed on Unasyn and Linezolid with good benefit. Ortho completed an I&D on 1/5/23, but likely will have to go back to OR for further I&D or TMA.     Interval Problem Update  -No acute events overnight  -Successful I&D 1/5/12. Possibly may have to go back to OR later this hospital course for another I&D or TMA.  -Evaluated wound with wound care later in afternoon. Cellulitis appears much improved, will continue antibiotic regimen. Evaluation of ~2\" deep wound that runs from the plantar aspect of great toe base almost through to the dorsal superficial skin.       I have discussed this patient's plan of care and discharge plan at IDT rounds today with Case Management, Nursing, Nursing leadership, and other members of the IDT team.    Consultants/Specialty  orthopedics and Limb preservation service    Code Status  Full Code    Disposition  Patient is not medically cleared for discharge.   Anticipate discharge to to home with close outpatient follow-up.  I have placed the appropriate orders for post-discharge needs.    Review of Systems  Review of Systems   Constitutional:  Negative for chills, fever and malaise/fatigue.   HENT:  Negative for hearing loss.    Eyes:  Negative for blurred vision and double vision.   Respiratory:  Negative for cough, shortness of breath and wheezing.    Cardiovascular:  Positive for leg swelling (appears " improved along superior aspects.). Negative for chest pain.   Gastrointestinal:  Negative for abdominal pain.   Genitourinary:  Negative for dysuria.   Musculoskeletal:  Negative for joint pain.   Skin:  Positive for rash. Negative for itching.   Neurological:  Negative for dizziness, loss of consciousness and headaches.   Psychiatric/Behavioral:  Positive for depression. The patient is nervous/anxious. The patient does not have insomnia.       Physical Exam  Temp:  [36.3 °C (97.3 °F)-38.7 °C (101.6 °F)] 36.3 °C (97.4 °F)  Pulse:  [] 84  Resp:  [16-20] 18  BP: ()/(55-79) 122/60  SpO2:  [90 %-96 %] 90 %    Physical Exam  Vitals and nursing note reviewed.   Constitutional:       General: He is not in acute distress.     Appearance: Normal appearance. He is obese.   HENT:      Head: Normocephalic and atraumatic.      Right Ear: External ear normal.      Left Ear: External ear normal.   Eyes:      General: No scleral icterus.     Conjunctiva/sclera: Conjunctivae normal.   Cardiovascular:      Rate and Rhythm: Normal rate and regular rhythm.      Heart sounds: Normal heart sounds. No murmur heard.  Pulmonary:      Effort: Pulmonary effort is normal. No respiratory distress.   Abdominal:      General: Abdomen is flat. There is no distension.      Tenderness: There is no abdominal tenderness.   Musculoskeletal:        Feet:    Feet:      Right foot:      Toenail Condition: Right toenails are abnormally thick.      Left foot:      Skin integrity: Ulcer and erythema present. No skin breakdown.      Toenail Condition: Left toenails are abnormally thick.      Comments: Superficial erythema, edema seems much improved.   Clear stitches present on medial aspect.  At base of great toe is a ~5cm deep wound that basically progresses to the skin on the dorsal aspect.   Tophus still present.   Skin:     General: Skin is warm.      Coloration: Skin is not jaundiced.      Findings: Erythema and rash present.   Neurological:       General: No focal deficit present.      Mental Status: He is alert and oriented to person, place, and time. Mental status is at baseline.   Psychiatric:         Mood and Affect: Mood normal.         Behavior: Behavior normal.         Thought Content: Thought content normal.         Judgment: Judgment normal.       Fluids    Intake/Output Summary (Last 24 hours) at 1/6/2023 1438  Last data filed at 1/6/2023 1136  Gross per 24 hour   Intake 960 ml   Output 200 ml   Net 760 ml       Laboratory  Recent Labs     01/04/23 0136 01/05/23  0209 01/06/23  0415   WBC 13.7* 15.8* 14.1*   RBC 4.70 4.56* 4.33*   HEMOGLOBIN 13.1* 12.6* 12.2*   HEMATOCRIT 40.7* 38.4* 36.9*   MCV 86.6 84.2 85.2   MCH 27.9 27.6 28.2   MCHC 32.2* 32.8* 33.1*   RDW 44.1 42.4 42.6   PLATELETCT 212 216 209   MPV 10.6 10.1 10.0     Recent Labs     01/04/23 0136 01/05/23 0209 01/06/23 0415   SODIUM 135 130* 131*   POTASSIUM 3.6 3.3* 3.6   CHLORIDE 101 98 98   CO2 24 23 24   GLUCOSE 183* 201* 208*   BUN 11 8 6*   CREATININE 0.64 0.54 0.59   CALCIUM 8.3* 8.1* 8.1*                   Imaging  US-ONUR SINGLE LEVEL BILAT   Final Result      DX-CHEST-PORTABLE (1 VIEW)    (Results Pending)        Assessment/Plan  Problem Representation:    * Sepsis (HCC)- (present on admission)  Assessment & Plan  Criteria: febrile 101.3, WBC=18.9, tachycardia 101, Clear source of Left diabetic foot ulcer,  Present on admission at [facility]. WBC stable ~14-17. Source control underway    Antibiotics: Unasyn, Linezolid (MRSA nares negative, but positive cultures)  Spiked fever again 1/6: CXR, UA, blood cultures, ordered    Left diabetic foot ulcer and infection- (present on admission)  Assessment & Plan  Elevated crp during admission at [facility] prior to transfer  MRI negative for osteomyelitis. ONUR shows normal arterial flow.     - Wound care consulted, following.  - wound culture + MRSA. Continue unasyn and linezolid  - leukocytosis improving, blood  cultures NGTD  -I&D 1/05/23, successful, looks improved 1/06/23  -Holding chemical dvt ppx for upcoming procedures another I&D vs. TMA      Type 2 diabetes mellitus with circulatory disorder, without long-term current use of insulin (HCC)- (present on admission)  Assessment & Plan  Not on home med, pt states ran out of metformin. Last A1C 9.3% on 1/2/22    - insulin glargine 10 U   - sliding scale insulin   - hypoglycemia protocol  - diabetic education     Hypokalemia- (present on admission)  Assessment & Plan  Replace for goal greater than 4.    MRSA cellulitis of left foot- (present on admission)  Assessment & Plan  As per wound cultures.    Hyponatremia- (present on admission)  Assessment & Plan  Na 130 on presentation, improved with NS infusion  -Resolved           VTE prophylaxis: SCDs/TEDs and pharmacologic prophylaxis contraindicated due to Procedure    I have performed a physical exam and reviewed and updated ROS and Plan today (1/6/2023). In review of yesterday's note (1/5/2023), there are no changes except as documented above.      Discharge pending I&D, Infection resolution.

## 2023-01-06 NOTE — CONSULTS
"1/5/2023    The patient was seen at the request of Dr guzman    HPI: Jimmy Amaya is a 67 y.o. male who presents with complaints of pain to left foot.  This started 4 months ago after ulcer.  The pain is 5/10 and is described as sharp.  The pain is made worse by palpation of the area and made better by rest and immobilization.    Past Medical History:   Diagnosis Date    Diabetes (HCC)        Past Surgical History:   Procedure Laterality Date    DENTAL EXTRACTION(S)         Medications  No current facility-administered medications on file prior to encounter.     No current outpatient medications on file prior to encounter.       Allergies  Patient has no known allergies.    ROS  Left foot draining wound. All other systems were reviewed and found to be negative    Family History   Problem Relation Age of Onset    Cancer Father        Social History     Socioeconomic History    Marital status: Unknown   Tobacco Use    Smoking status: Never    Smokeless tobacco: Never   Vaping Use    Vaping Use: Never used   Substance and Sexual Activity    Alcohol use: Not Currently    Drug use: Never    Sexual activity: Not Currently       Physical Exam  Vitals  BP (!) 149/72   Pulse (!) 107   Temp 36.8 °C (98.2 °F) (Temporal)   Resp 18   Ht 1.88 m (6' 2.02\")   Wt 116 kg (256 lb 2.8 oz)   SpO2 91%   General: Well Developed, Well Nourished, Age appropriate appearance  HEENT: Normocephalic, atraumatic  Psych: Normal mood and affect  Neck: Supple, nontender, no masses  Lungs: Breathing unlabored, No audible wheezing  Heart: Regular heart rate and rhythm  Abdomen: Soft, NT, ND  Neuro: Sensation grossly intact to BUE and BLE, moving all four extremities  Skin: Intact, no open wounds  Vascular: 1+DP/PT, Capillary refill <2 seconds  MSK: Left foot plantar wound with purulent drainedge      Radiographs:  MRI shows no osteomyelitis  US-ONUR SINGLE LEVEL BILAT   Final Result          Laboratory Values  Recent Labs     01/03/23  0255 " 01/04/23  0136 01/05/23  0209   WBC 14.0* 13.7* 15.8*   RBC 4.88 4.70 4.56*   HEMOGLOBIN 13.6* 13.1* 12.6*   HEMATOCRIT 41.5* 40.7* 38.4*   MCV 85.0 86.6 84.2   MCH 27.9 27.9 27.6   MCHC 32.8* 32.2* 32.8*   RDW 43.3 44.1 42.4   PLATELETCT 200 212 216   MPV 10.4 10.6 10.1     Recent Labs     01/03/23  0255 01/04/23  0136 01/05/23  0209   SODIUM 132* 135 130*   POTASSIUM 3.8 3.6 3.3*   CHLORIDE 101 101 98   CO2 21 24 23   GLUCOSE 224* 183* 201*   BUN 15 11 8             Impression:Left foot plantar infection    Plan:We discussed the diagnosis and findings with the patient at length.  We reviewed possible non operative and operative interventions and the risks and benefits of each of these.  he had a chance to ask questions and all of these were answered to his satisfaction. The patient chose to proceed with  operative intervention. Risks and benefits of surgery were discussed which include but are not limited to bleeding, infection, neurovascular damage, malunion, nonunion, instability, limb length discrepancy, DVT, PE, MI, Stroke and death. They understand these risks and wish to proceed.

## 2023-01-06 NOTE — ANESTHESIA PROCEDURE NOTES
Airway    Date/Time: 1/5/2023 7:31 PM  Performed by: Chris Ojeda M.D.  Authorized by: Chris Ojeda M.D.     Location:  OR  Urgency:  Elective  Indications for Airway Management:  Anesthesia      Spontaneous Ventilation: absent    Sedation Level:  Deep  Preoxygenated: Yes    Patient Position:  Sniffing  Mask Difficulty Assessment:  0 - not attempted  Final Airway Type:  Supraglottic airway  Final Supraglottic Airway:  Standard LMA    SGA Size:  5  Number of Attempts at Approach:  1

## 2023-01-06 NOTE — ANESTHESIA PREPROCEDURE EVALUATION
Case: [id number] Date/Time: 01/05/23 1905    Procedure: INCISION AND DRAINAGE, WOUND, BY ORTHOPEDICS - FOOT (Left: Foot)    Location: [place] [address] / SURGERY [facility]    Surgeons: Giancarlo García M.D.          Relevant Problems   ENDO   (positive) Type 2 diabetes mellitus with circulatory disorder, without long-term current use of insulin (HCC)   (positive) Type 2 diabetes mellitus with diabetic polyneuropathy (HCC)       Physical Exam    Airway   Mallampati: II  TM distance: >3 FB  Neck ROM: full       Cardiovascular - normal exam  Rhythm: regular  Rate: normal  (-) murmur     Dental - normal exam           Pulmonary - normal exam  Breath sounds clear to auscultation     Abdominal    Neurological - normal exam                 Anesthesia Plan    ASA 3- EMERGENT   ASA physical status 3 criteria: diabetes - poorly controlledASA physical status emergent criteria: acutely contaminated wound or identified infection source    Plan - general       Airway plan will be LMA          Induction: intravenous    Postoperative Plan: Postoperative administration of opioids is intended.    Pertinent diagnostic labs and testing reviewed    Informed Consent:    Anesthetic plan and risks discussed with patient.    Use of blood products discussed with: patient whom consented to blood products.

## 2023-01-06 NOTE — PROGRESS NOTES
Telemetry Report:    Rhythm: NSR  Hear Rate: 93bpm  Ectopy:    IN: 0.152 sec  QRS: 0.093 sec  QT: 0.374 sec

## 2023-01-06 NOTE — HOSPITAL COURSE
Jimmy Amaya is a 67 y.o. male with past medical history of uncontrolled diabetes (pt states ran out of metformin) who presented 1/4/2023 as a transfer from [facility] for L diabetic foot ulcer requiring LPS service.  Patient was placed on Unasyn and Linezolid with good benefit. Ortho completed an I&D on 1/5/23, but likely will have to go back to OR for further I&D or TMA.

## 2023-01-06 NOTE — ANESTHESIA TIME REPORT
Anesthesia Start and Stop Event Times     Date Time Event    1/5/2023 1839 Ready for Procedure     1926 Anesthesia Start     2004 Anesthesia Stop        Responsible Staff  01/05/23    Name Role Begin End    Chris Ojeda M.D. Anesth 1926 2004        Overtime Reason:  no overtime (within assigned shift)    Comments:

## 2023-01-06 NOTE — CARE PLAN
Problem: Knowledge Deficit - Standard  Goal: Patient and family/care givers will demonstrate understanding of plan of care, disease process/condition, diagnostic tests and medications  Outcome: Progressing    Note: pt will be educated regarding wound care management     Problem: Physical Regulation  Goal: Signs and symptoms of infection will decrease  Outcome: Progressing   Note: encourage early ambulation and ADLs participation    Problem: Pain - Standard  Goal: Alleviation of pain or a reduction in pain to the patient’s comfort goal  Outcome: Progressing   The patient is Stable - Low risk of patient condition declining or worsening  Note; monitor pain and alleviate pain    Shift Goals  Clinical Goals: ambulate to the restroom  Patient Goals: increase in comfort  Family Goals: n/a

## 2023-01-06 NOTE — PROGRESS NOTES
4 Eyes Skin Assessment Completed by DEBRA Babcock and DEBRA Hernandez.    Head WDL  Ears WDL  Nose WDL  Mouth WDL  Neck WDL  Breast/Chest WDL  Shoulder Blades WDL  Spine WDL  (R) Arm/Elbow/Hand WDL  (L) Arm/Elbow/Hand WDL  Abdomen WDL  Groin WDL  Scrotum/Coccyx/Buttocks WDL  (R) Leg WDL  (L) Leg Redness, Swelling, Shiny, Edema, and Incision  (R) Heel/Foot/Toe WDL  (L) Heel/Foot/Toe Edema          Devices In Places Blood Pressure Cuff and Nasal Cannula      Interventions In Place Gray Ear Foams and Pillows    Possible Skin Injury No    Pictures Uploaded Into Epic No, needs to be completed  Wound Consult Placed Yes  RN Wound Prevention Protocol Ordered  Yes

## 2023-01-06 NOTE — CONSULTS
Diabetes education: Pt has a hx of diabetes on metformin per chart ( previously on Semaglutide ). Pt was admitted with blood sugar of 257 at [facility] and 183 after transfer to [facility].  Pt is currently on Glargine 15 units pm ( not yet started/given), and Lispro per sliding scale and hs ( 175, 171 but not given as per MAR pt was NPO).  CDE attempted to meet pt but he was preparing to leave for surgery.  Plan: CDE to follow up tomorrow.

## 2023-01-07 PROBLEM — A41.02 SEPSIS DUE TO METHICILLIN RESISTANT STAPHYLOCOCCUS AUREUS (MRSA) WITHOUT ACUTE ORGAN DYSFUNCTION (HCC): Status: ACTIVE | Noted: 2023-01-02

## 2023-01-07 LAB
ALBUMIN SERPL BCP-MCNC: 2.5 G/DL (ref 3.2–4.9)
ALBUMIN/GLOB SERPL: 0.7 G/DL
ALP SERPL-CCNC: 123 U/L (ref 30–99)
ALT SERPL-CCNC: 32 U/L (ref 2–50)
ANION GAP SERPL CALC-SCNC: 7 MMOL/L (ref 7–16)
AST SERPL-CCNC: 32 U/L (ref 12–45)
BACTERIA BLD CULT: NORMAL
BACTERIA BLD CULT: NORMAL
BACTERIA STL CULT: NORMAL
BILIRUB SERPL-MCNC: 0.7 MG/DL (ref 0.1–1.5)
BUN SERPL-MCNC: 7 MG/DL (ref 8–22)
C JEJUNI+C COLI AG STL QL: NORMAL
CALCIUM ALBUM COR SERPL-MCNC: 9.3 MG/DL (ref 8.5–10.5)
CALCIUM SERPL-MCNC: 8.1 MG/DL (ref 8.5–10.5)
CHLORIDE SERPL-SCNC: 100 MMOL/L (ref 96–112)
CO2 SERPL-SCNC: 26 MMOL/L (ref 20–33)
CREAT SERPL-MCNC: 0.48 MG/DL (ref 0.5–1.4)
E COLI SXT1+2 STL IA: NORMAL
ERYTHROCYTE [DISTWIDTH] IN BLOOD BY AUTOMATED COUNT: 42.7 FL (ref 35.9–50)
FUNGUS SPEC FUNGUS STN: NORMAL
GFR SERPLBLD CREATININE-BSD FMLA CKD-EPI: 113 ML/MIN/1.73 M 2
GLOBULIN SER CALC-MCNC: 3.6 G/DL (ref 1.9–3.5)
GLUCOSE BLD STRIP.AUTO-MCNC: 159 MG/DL (ref 65–99)
GLUCOSE BLD STRIP.AUTO-MCNC: 162 MG/DL (ref 65–99)
GLUCOSE SERPL-MCNC: 168 MG/DL (ref 65–99)
HCT VFR BLD AUTO: 37.5 % (ref 42–52)
HGB BLD-MCNC: 12.2 G/DL (ref 14–18)
MAGNESIUM SERPL-MCNC: 2 MG/DL (ref 1.5–2.5)
MCH RBC QN AUTO: 27.7 PG (ref 27–33)
MCHC RBC AUTO-ENTMCNC: 32.5 G/DL (ref 33.7–35.3)
MCV RBC AUTO: 85 FL (ref 81.4–97.8)
PLATELET # BLD AUTO: 228 K/UL (ref 164–446)
PMV BLD AUTO: 9.6 FL (ref 9–12.9)
POTASSIUM SERPL-SCNC: 3.8 MMOL/L (ref 3.6–5.5)
PROT SERPL-MCNC: 6.1 G/DL (ref 6–8.2)
RBC # BLD AUTO: 4.41 M/UL (ref 4.7–6.1)
SIGNIFICANT IND 70042: NORMAL
SITE SITE: NORMAL
SODIUM SERPL-SCNC: 133 MMOL/L (ref 135–145)
SOURCE SOURCE: NORMAL
WBC # BLD AUTO: 9.7 K/UL (ref 4.8–10.8)

## 2023-01-07 PROCEDURE — A9270 NON-COVERED ITEM OR SERVICE: HCPCS | Performed by: STUDENT IN AN ORGANIZED HEALTH CARE EDUCATION/TRAINING PROGRAM

## 2023-01-07 PROCEDURE — 770020 HCHG ROOM/CARE - TELE (206)

## 2023-01-07 PROCEDURE — A9270 NON-COVERED ITEM OR SERVICE: HCPCS

## 2023-01-07 PROCEDURE — 85027 COMPLETE CBC AUTOMATED: CPT

## 2023-01-07 PROCEDURE — 80053 COMPREHEN METABOLIC PANEL: CPT

## 2023-01-07 PROCEDURE — A9270 NON-COVERED ITEM OR SERVICE: HCPCS | Performed by: FAMILY MEDICINE

## 2023-01-07 PROCEDURE — 99232 SBSQ HOSP IP/OBS MODERATE 35: CPT | Mod: GC | Performed by: STUDENT IN AN ORGANIZED HEALTH CARE EDUCATION/TRAINING PROGRAM

## 2023-01-07 PROCEDURE — 36415 COLL VENOUS BLD VENIPUNCTURE: CPT

## 2023-01-07 PROCEDURE — 82962 GLUCOSE BLOOD TEST: CPT | Mod: 91

## 2023-01-07 PROCEDURE — 700102 HCHG RX REV CODE 250 W/ 637 OVERRIDE(OP): Performed by: STUDENT IN AN ORGANIZED HEALTH CARE EDUCATION/TRAINING PROGRAM

## 2023-01-07 PROCEDURE — 700102 HCHG RX REV CODE 250 W/ 637 OVERRIDE(OP)

## 2023-01-07 PROCEDURE — 700101 HCHG RX REV CODE 250

## 2023-01-07 PROCEDURE — 700102 HCHG RX REV CODE 250 W/ 637 OVERRIDE(OP): Performed by: FAMILY MEDICINE

## 2023-01-07 PROCEDURE — 700111 HCHG RX REV CODE 636 W/ 250 OVERRIDE (IP)

## 2023-01-07 PROCEDURE — 99024 POSTOP FOLLOW-UP VISIT: CPT | Performed by: ORTHOPAEDIC SURGERY

## 2023-01-07 PROCEDURE — 83735 ASSAY OF MAGNESIUM: CPT

## 2023-01-07 PROCEDURE — 700105 HCHG RX REV CODE 258: Performed by: STUDENT IN AN ORGANIZED HEALTH CARE EDUCATION/TRAINING PROGRAM

## 2023-01-07 PROCEDURE — 700111 HCHG RX REV CODE 636 W/ 250 OVERRIDE (IP): Performed by: STUDENT IN AN ORGANIZED HEALTH CARE EDUCATION/TRAINING PROGRAM

## 2023-01-07 RX ORDER — POTASSIUM CHLORIDE 20 MEQ/1
20 TABLET, EXTENDED RELEASE ORAL ONCE
Status: COMPLETED | OUTPATIENT
Start: 2023-01-07 | End: 2023-01-07

## 2023-01-07 RX ORDER — HEPARIN SODIUM 5000 [USP'U]/ML
5000 INJECTION, SOLUTION INTRAVENOUS; SUBCUTANEOUS EVERY 8 HOURS
Status: DISCONTINUED | OUTPATIENT
Start: 2023-01-07 | End: 2023-01-09 | Stop reason: HOSPADM

## 2023-01-07 RX ADMIN — AMPICILLIN AND SULBACTAM 3 G: 1; 2 INJECTION, POWDER, FOR SOLUTION INTRAMUSCULAR; INTRAVENOUS at 12:02

## 2023-01-07 RX ADMIN — AMPICILLIN AND SULBACTAM 3 G: 1; 2 INJECTION, POWDER, FOR SOLUTION INTRAMUSCULAR; INTRAVENOUS at 04:36

## 2023-01-07 RX ADMIN — INSULIN LISPRO 1 UNITS: 100 INJECTION, SOLUTION INTRAVENOUS; SUBCUTANEOUS at 14:17

## 2023-01-07 RX ADMIN — HEPARIN SODIUM 5000 UNITS: 5000 INJECTION, SOLUTION INTRAVENOUS; SUBCUTANEOUS at 17:45

## 2023-01-07 RX ADMIN — INSULIN LISPRO 1 UNITS: 100 INJECTION, SOLUTION INTRAVENOUS; SUBCUTANEOUS at 19:03

## 2023-01-07 RX ADMIN — DAKIN'S SOLUTION 0.125% (QUARTER STRENGTH) 0.12 ML: 0.12 SOLUTION at 04:37

## 2023-01-07 RX ADMIN — INSULIN LISPRO 1 UNITS: 100 INJECTION, SOLUTION INTRAVENOUS; SUBCUTANEOUS at 09:29

## 2023-01-07 RX ADMIN — LINEZOLID 600 MG: 600 TABLET, FILM COATED ORAL at 04:34

## 2023-01-07 RX ADMIN — AMPICILLIN AND SULBACTAM 3 G: 1; 2 INJECTION, POWDER, FOR SOLUTION INTRAMUSCULAR; INTRAVENOUS at 23:29

## 2023-01-07 RX ADMIN — OXYCODONE HYDROCHLORIDE 5 MG: 5 TABLET ORAL at 03:58

## 2023-01-07 RX ADMIN — DOCUSATE SODIUM 50 MG AND SENNOSIDES 8.6 MG 2 TABLET: 8.6; 5 TABLET, FILM COATED ORAL at 18:07

## 2023-01-07 RX ADMIN — INSULIN LISPRO 1 UNITS: 100 INJECTION, SOLUTION INTRAVENOUS; SUBCUTANEOUS at 21:14

## 2023-01-07 RX ADMIN — LOPERAMIDE HYDROCHLORIDE 2 MG: 2 CAPSULE ORAL at 04:34

## 2023-01-07 RX ADMIN — POTASSIUM CHLORIDE 20 MEQ: 1500 TABLET, EXTENDED RELEASE ORAL at 18:04

## 2023-01-07 RX ADMIN — LINEZOLID 600 MG: 600 TABLET, FILM COATED ORAL at 18:04

## 2023-01-07 ASSESSMENT — ENCOUNTER SYMPTOMS
DOUBLE VISION: 0
BLURRED VISION: 0
LOSS OF CONSCIOUSNESS: 0
COUGH: 0
DIZZINESS: 0
CHILLS: 0
ABDOMINAL PAIN: 0
HEADACHES: 0
DEPRESSION: 1
FEVER: 0
SHORTNESS OF BREATH: 0
WHEEZING: 0
INSOMNIA: 0
NERVOUS/ANXIOUS: 1

## 2023-01-07 ASSESSMENT — PAIN DESCRIPTION - PAIN TYPE
TYPE: ACUTE PAIN
TYPE: CHRONIC PAIN
TYPE: ACUTE PAIN

## 2023-01-07 NOTE — PROGRESS NOTES
POD#2  S/P foot I&D  Does not want amputation  Continue dressing changes and ABX  WBAT through heel

## 2023-01-07 NOTE — CARE PLAN
The patient is Stable - Low risk of patient condition declining or worsening    Shift Goals  Clinical Goals: ABX  Patient Goals: sleep  Family Goals: MICHELINE    Progress made toward(s) clinical / shift goals:    Problem: Urinary - Renal Perfusion  Goal: Ability to achieve and maintain adequate renal perfusion and functioning will improve  Outcome: Progressing  Note: Pt UOP adequate at this time. Pt using urinal to void. Water offered but pt refused at this time.     Problem: Pain - Standard  Goal: Alleviation of pain or a reduction in pain to the patient’s comfort goal  Outcome: Progressing  Note: Pt has no complaints of pain at this time.       Patient is not progressing towards the following goals:

## 2023-01-07 NOTE — PROGRESS NOTES
LIMB PRESERVATION SERVICE   POST SURGICAL PROGRESS NOTE      HPI:  Jimmy Amaya is a 67 y.o.  with a past medical history that includes type 2 diabetes.  Admitted 1/4/2023 for Diabetic foot infection (HCC) [E11.628, L08.9].     LPS has been consulted for plantar left first MTH wound with purulent drainage.  The ulcer started approximately 3 to 4 months ago in late September or early October 2022.  Patient states she originally developed a small blister to his left plantar first MTH.  This then ruptured and developed into a wound.  He has been providing his own wound care and felt that it was improving slowly.  He states the area began to have a callus which she has been shaving down.  Patient states that approximately just over a week ago he was in a hurry to get to work and did not notice that the lid to a Gatorade bottle had fallen into his shoe.  Patient states he has severe neuropathy bilaterally and he walked at work for 7 hours with the lid in his left shoe.  He states he then developed a worsening bullae to the forefoot of his left foot.  Due to the increasing pain erythema and edema patient presented to ED at [facility].  Patient had wound cultures there which showed positive for MRSA.  Patient was transferred to [facility] in order to be evaluated by orthopedic surgery due to severity of left foot wound.    SURGERY DATE: 1/5/2023 Dr. García  PROCEDURE: Irrigation and debridement of  left foot abscess      INTERVAL HISTORY:  1/6/2023: POD #1.  Patient denies fevers, chills, nausea, vomiting.  Pain well controlled.  Discussed plan for dressing change today and initiation of Dakin's wet-to-dry.  Discussed with patient need for return to the OR with either further I&D versus TMA.  Patient very hesitant on thought of amputation.  Did discuss orthotic assistance postsurgically to remain mobile.  Patient helps prevent any level of amputation through Dakin's wet-to-dry and further I&D in the  "OR.      PERTINENT LPS RESULTS:   Pathology: None  OR culture: 1/5/2023-pending    SURGICAL SITE EXAM:      /61   Pulse 81   Temp 36.9 °C (98.5 °F) (Temporal)   Resp 18   Ht 1.88 m (6' 2.02\")   Wt 116 kg (256 lb 2.8 oz)   SpO2 92%   BMI 32.88 kg/m²   Monofilament testing completed on 1/5/2023  Sensation: insensate  Surgical foot warm     Pedal Pulses:   R foot: palpable DP, palpable PT  L foot: palpable DP, palpable PT      Incision   location: Left medial first ray   Incision well approximated, sutures intact.   Decreasing erythema  Mild edema  Scant, bloody drainage        Open surgical wound   location: Left first MTH  Full thickness, does  probe to bone  Wound characteristics:  red tissue  Moderate erythema  Bloody, drainage  No odor  Measures: 0.5 x 1 x 3      dressing care completed by LPS APRN.   Left lateral first ray incision: Applied single layer of Adaptic over sutures. Secured with gauze, roll gauze, tape, Ace.   Left plantar first MTH: Dakin's wet-to-dry-packed with strip packing soaked with Dakin's, covered with gauze soaked with Dakin's, secured with ABD pad, roll gauze, tape, Ace wrap    Photo(s):               DIABETES MANAGEMENT:    A1c:   Lab Results   Component Value Date/Time    HBA1C 9.0 (H) 01/05/2023 02:09 AM            INFECTION MANAGEMENT:    Results from last 7 days   Lab Units 01/06/23  0415 01/05/23  0209 01/04/23  0136 01/03/23  0255 01/02/23  1501   WBC 1501 K/uL 14.1* 15.8* 13.7* 14.0* 18.9*   PLATELET COUNT 1518 K/uL 209 216 212 200 221     Wound culture results:   Results       Procedure Component Value Units Date/Time    CULTURE WOUND W/ GRAM STAIN [[id number]]  (Abnormal) Collected: 01/05/23 1946    Order Status: Completed Specimen: Wound Updated: 01/06/23 1555     Significant Indicator POS     Source WND     Site Left Foot     Culture Result -     Gram Stain Result Moderate WBCs.  Few Gram positive cocci.       Culture Result Methicillin Resistant Staphylococcus " aureus  Light growth  See previous culture for sensitivity report.      Narrative:      Surgery - swabs received    Anaerobic Culture [[id number]] Collected: 01/05/23 1946    Order Status: No result Specimen: Wound Updated: 01/06/23 1555     Significant Indicator NEG     Source WND     Site Left Foot     Culture Result -    Narrative:      Surgery - swabs received    Fungal Culture [[id number]] Collected: 01/05/23 1946    Order Status: Completed Specimen: Wound Updated: 01/06/23 1555     Significant Indicator NEG     Source WND     Site Left Foot     Culture Result Culture in progress.     Fungal Smear Results -    Narrative:      Surgery - swabs received    BLOOD CULTURE [[id number]]     Order Status: Sent Specimen: Blood from Peripheral     BLOOD CULTURE [[id number]]     Order Status: Sent Specimen: Blood from Peripheral     URINALYSIS [[id number]]     Order Status: No result Specimen: Urine, Clean Catch     GRAM STAIN [[id number]] Collected: 01/05/23 1946    Order Status: Completed Specimen: Wound Updated: 01/06/23 0138     Significant Indicator .     Source WND     Site Left Foot     Gram Stain Result Moderate WBCs.  Few Gram positive cocci.      Narrative:      Surgery - swabs received    S. Aureus By PCR, Nasal Complete [[id number]]     Order Status: No result Specimen: Respirate from Respiratory     CULTURE STOOL [[id number]] Collected: 01/05/23 1147    Order Status: Sent Specimen: Stool Updated: 01/05/23 1205    Narrative:      Contact  Collected By: [id number] FREY APRIL  For adult patients only; culture includes screen for  Campylobacter.                 ASSESSMENT/PLAN:   POD #1 S/P Irrigation and debridement of  left foot abscess Dr. García    -Discussed plan for return to the OR with Dr. García for further debridement of left foot abscess and possible need for TMA.  -Initiated Dakin's wet-to-dry to open plantar wound  -Dry dressing initiated to left lateral first ray incision      Wound care:   -Wound care  orders updated for nursing by LPS   Left lateral first ray incision: Applied single layer of Adaptic over sutures. Secured with gauze, roll gauze, tape, Ace.   Left plantar first MTH: Dakin's wet-to-dry-packed with strip packing soaked with Dakin's, covered with gauze soaked with Dakin's, secured with ABD pad, roll gauze, tape, Ace wrap    Imaging/Labs:  -COVID-19: Negative on 1/2/2023    Vascular status:   - palpable pedal pulses bilaterally     Surgery:   -- Plan for return to the OR this week for further left foot I&D versus left TMA with Dr. García    Antibiotics:   -ID not involved. Antibiotics managed by hospitalist.    Weight Bearing Status:   -Heel weight bearing    Offloading:   -Offloading shoe;   - Offloading device at bedside      PT/OT:   -not involved at this time      Diabetes Education:   - consult placed for CDE and RD.     - Implications of loss of protective sensation (LOPS) discussed with patient- including increased risk for amputation.  Advised to check feet at least daily, moisturize feet, and to always wear protective foot wear.   -avoid trimming own nails. See podiatrist or certified foot and nail RN  -keep blood sugars <150 for improved wound healing            DISCHARGE PLAN:    Disposition:   -TBD      Discussed with: pt, RN, Dr. García, Dr. Mast             Please note that this dictation was created using voice recognition software. I have  worked with technical experts from [facility]  [employer] to optimize the interface.  I have made every reasonable attempt to correct obvious errors, but there may be errors of grammar and possibly content that I did not discover before finalizing the note.      Shefali Francsi, A.P.R.N.    If any questions or concerns, please contact [facility] through voalte.

## 2023-01-08 PROBLEM — R74.8 ELEVATED ALKALINE PHOSPHATASE LEVEL: Status: ACTIVE | Noted: 2023-01-08

## 2023-01-08 PROBLEM — L03.116 MRSA CELLULITIS OF LEFT FOOT: Status: RESOLVED | Noted: 2023-01-05 | Resolved: 2023-01-08

## 2023-01-08 PROBLEM — E88.09 HYPOALBUMINEMIA: Status: ACTIVE | Noted: 2023-01-08

## 2023-01-08 PROBLEM — E11.628 DIABETIC FOOT INFECTION (HCC): Status: RESOLVED | Noted: 2023-01-02 | Resolved: 2023-01-08

## 2023-01-08 PROBLEM — L08.9 DIABETIC FOOT INFECTION (HCC): Status: RESOLVED | Noted: 2023-01-02 | Resolved: 2023-01-08

## 2023-01-08 PROBLEM — B95.62 MRSA CELLULITIS OF LEFT FOOT: Status: RESOLVED | Noted: 2023-01-05 | Resolved: 2023-01-08

## 2023-01-08 PROBLEM — L97.429 DIABETIC ULCER OF LEFT MIDFOOT ASSOCIATED WITH TYPE 2 DIABETES MELLITUS (HCC): Status: ACTIVE | Noted: 2023-01-08

## 2023-01-08 PROBLEM — E11.621 DIABETIC ULCER OF LEFT MIDFOOT ASSOCIATED WITH TYPE 2 DIABETES MELLITUS (HCC): Status: ACTIVE | Noted: 2023-01-08

## 2023-01-08 LAB
ALBUMIN SERPL BCP-MCNC: 2.9 G/DL (ref 3.2–4.9)
ALBUMIN/GLOB SERPL: 0.9 G/DL
ALP SERPL-CCNC: 124 U/L (ref 30–99)
ALT SERPL-CCNC: 38 U/L (ref 2–50)
ANION GAP SERPL CALC-SCNC: 8 MMOL/L (ref 7–16)
AST SERPL-CCNC: 28 U/L (ref 12–45)
BACTERIA SPEC ANAEROBE CULT: NORMAL
BILIRUB SERPL-MCNC: 0.7 MG/DL (ref 0.1–1.5)
BUN SERPL-MCNC: 6 MG/DL (ref 8–22)
CALCIUM ALBUM COR SERPL-MCNC: 9.3 MG/DL (ref 8.5–10.5)
CALCIUM SERPL-MCNC: 8.4 MG/DL (ref 8.5–10.5)
CHLORIDE SERPL-SCNC: 99 MMOL/L (ref 96–112)
CO2 SERPL-SCNC: 26 MMOL/L (ref 20–33)
CREAT SERPL-MCNC: 0.54 MG/DL (ref 0.5–1.4)
ERYTHROCYTE [DISTWIDTH] IN BLOOD BY AUTOMATED COUNT: 42.5 FL (ref 35.9–50)
GFR SERPLBLD CREATININE-BSD FMLA CKD-EPI: 109 ML/MIN/1.73 M 2
GLOBULIN SER CALC-MCNC: 3.4 G/DL (ref 1.9–3.5)
GLUCOSE BLD STRIP.AUTO-MCNC: 125 MG/DL (ref 65–99)
GLUCOSE BLD STRIP.AUTO-MCNC: 139 MG/DL (ref 65–99)
GLUCOSE BLD STRIP.AUTO-MCNC: 151 MG/DL (ref 65–99)
GLUCOSE BLD STRIP.AUTO-MCNC: 164 MG/DL (ref 65–99)
GLUCOSE BLD STRIP.AUTO-MCNC: 168 MG/DL (ref 65–99)
GLUCOSE BLD STRIP.AUTO-MCNC: 180 MG/DL (ref 65–99)
GLUCOSE SERPL-MCNC: 130 MG/DL (ref 65–99)
HCT VFR BLD AUTO: 39.8 % (ref 42–52)
HGB BLD-MCNC: 12.8 G/DL (ref 14–18)
MAGNESIUM SERPL-MCNC: 2 MG/DL (ref 1.5–2.5)
MCH RBC QN AUTO: 27.5 PG (ref 27–33)
MCHC RBC AUTO-ENTMCNC: 32.2 G/DL (ref 33.7–35.3)
MCV RBC AUTO: 85.6 FL (ref 81.4–97.8)
PLATELET # BLD AUTO: 285 K/UL (ref 164–446)
PMV BLD AUTO: 9.8 FL (ref 9–12.9)
POTASSIUM SERPL-SCNC: 3.6 MMOL/L (ref 3.6–5.5)
PROT SERPL-MCNC: 6.3 G/DL (ref 6–8.2)
RBC # BLD AUTO: 4.65 M/UL (ref 4.7–6.1)
SIGNIFICANT IND 70042: NORMAL
SITE SITE: NORMAL
SODIUM SERPL-SCNC: 133 MMOL/L (ref 135–145)
SOURCE SOURCE: NORMAL
WBC # BLD AUTO: 9 K/UL (ref 4.8–10.8)

## 2023-01-08 PROCEDURE — 82962 GLUCOSE BLOOD TEST: CPT | Mod: 91

## 2023-01-08 PROCEDURE — A9270 NON-COVERED ITEM OR SERVICE: HCPCS | Performed by: STUDENT IN AN ORGANIZED HEALTH CARE EDUCATION/TRAINING PROGRAM

## 2023-01-08 PROCEDURE — A9270 NON-COVERED ITEM OR SERVICE: HCPCS | Performed by: FAMILY MEDICINE

## 2023-01-08 PROCEDURE — 80053 COMPREHEN METABOLIC PANEL: CPT

## 2023-01-08 PROCEDURE — 700105 HCHG RX REV CODE 258: Performed by: INTERNAL MEDICINE

## 2023-01-08 PROCEDURE — 700102 HCHG RX REV CODE 250 W/ 637 OVERRIDE(OP): Performed by: STUDENT IN AN ORGANIZED HEALTH CARE EDUCATION/TRAINING PROGRAM

## 2023-01-08 PROCEDURE — 700102 HCHG RX REV CODE 250 W/ 637 OVERRIDE(OP): Performed by: INTERNAL MEDICINE

## 2023-01-08 PROCEDURE — 99222 1ST HOSP IP/OBS MODERATE 55: CPT | Performed by: INTERNAL MEDICINE

## 2023-01-08 PROCEDURE — 700102 HCHG RX REV CODE 250 W/ 637 OVERRIDE(OP): Performed by: FAMILY MEDICINE

## 2023-01-08 PROCEDURE — 99232 SBSQ HOSP IP/OBS MODERATE 35: CPT | Mod: GC | Performed by: STUDENT IN AN ORGANIZED HEALTH CARE EDUCATION/TRAINING PROGRAM

## 2023-01-08 PROCEDURE — 700111 HCHG RX REV CODE 636 W/ 250 OVERRIDE (IP)

## 2023-01-08 PROCEDURE — A9270 NON-COVERED ITEM OR SERVICE: HCPCS | Performed by: INTERNAL MEDICINE

## 2023-01-08 PROCEDURE — 770001 HCHG ROOM/CARE - MED/SURG/GYN PRIV*

## 2023-01-08 PROCEDURE — 83735 ASSAY OF MAGNESIUM: CPT

## 2023-01-08 PROCEDURE — 700111 HCHG RX REV CODE 636 W/ 250 OVERRIDE (IP): Performed by: INTERNAL MEDICINE

## 2023-01-08 PROCEDURE — 85027 COMPLETE CBC AUTOMATED: CPT

## 2023-01-08 PROCEDURE — 700105 HCHG RX REV CODE 258: Performed by: STUDENT IN AN ORGANIZED HEALTH CARE EDUCATION/TRAINING PROGRAM

## 2023-01-08 PROCEDURE — 700111 HCHG RX REV CODE 636 W/ 250 OVERRIDE (IP): Performed by: STUDENT IN AN ORGANIZED HEALTH CARE EDUCATION/TRAINING PROGRAM

## 2023-01-08 RX ORDER — AMOXICILLIN AND CLAVULANATE POTASSIUM 875; 125 MG/1; MG/1
1 TABLET, FILM COATED ORAL EVERY 12 HOURS
Status: DISCONTINUED | OUTPATIENT
Start: 2023-01-08 | End: 2023-01-09 | Stop reason: HOSPADM

## 2023-01-08 RX ADMIN — HEPARIN SODIUM 5000 UNITS: 5000 INJECTION, SOLUTION INTRAVENOUS; SUBCUTANEOUS at 14:34

## 2023-01-08 RX ADMIN — VANCOMYCIN HYDROCHLORIDE 3000 MG: 500 INJECTION, POWDER, LYOPHILIZED, FOR SOLUTION INTRAVENOUS at 14:35

## 2023-01-08 RX ADMIN — INSULIN LISPRO 1 UNITS: 100 INJECTION, SOLUTION INTRAVENOUS; SUBCUTANEOUS at 17:35

## 2023-01-08 RX ADMIN — OXYCODONE HYDROCHLORIDE 5 MG: 5 TABLET ORAL at 04:08

## 2023-01-08 RX ADMIN — LINEZOLID 600 MG: 600 TABLET, FILM COATED ORAL at 04:55

## 2023-01-08 RX ADMIN — DAKIN'S SOLUTION 0.125% (QUARTER STRENGTH) 1 ML: 0.12 SOLUTION at 06:00

## 2023-01-08 RX ADMIN — AMOXICILLIN AND CLAVULANATE POTASSIUM 1 TABLET: 875; 125 TABLET, FILM COATED ORAL at 17:36

## 2023-01-08 RX ADMIN — DOCUSATE SODIUM 50 MG AND SENNOSIDES 8.6 MG 2 TABLET: 8.6; 5 TABLET, FILM COATED ORAL at 17:36

## 2023-01-08 RX ADMIN — HEPARIN SODIUM 5000 UNITS: 5000 INJECTION, SOLUTION INTRAVENOUS; SUBCUTANEOUS at 04:55

## 2023-01-08 RX ADMIN — HEPARIN SODIUM 5000 UNITS: 5000 INJECTION, SOLUTION INTRAVENOUS; SUBCUTANEOUS at 21:25

## 2023-01-08 RX ADMIN — AMPICILLIN AND SULBACTAM 3 G: 1; 2 INJECTION, POWDER, FOR SOLUTION INTRAMUSCULAR; INTRAVENOUS at 04:55

## 2023-01-08 RX ADMIN — INSULIN LISPRO 1 UNITS: 100 INJECTION, SOLUTION INTRAVENOUS; SUBCUTANEOUS at 21:24

## 2023-01-08 ASSESSMENT — ENCOUNTER SYMPTOMS
DOUBLE VISION: 0
COUGH: 0
CHILLS: 0
DIZZINESS: 0
LOSS OF CONSCIOUSNESS: 0
DEPRESSION: 1
BLURRED VISION: 0
FEVER: 0
SHORTNESS OF BREATH: 0
WHEEZING: 0
NERVOUS/ANXIOUS: 1
ABDOMINAL PAIN: 0
INSOMNIA: 0
HEADACHES: 0

## 2023-01-08 ASSESSMENT — PAIN DESCRIPTION - PAIN TYPE
TYPE: ACUTE PAIN
TYPE: CHRONIC PAIN;ACUTE PAIN

## 2023-01-08 NOTE — DISCHARGE PLANNING
note:  Discussed with charge nurse, pt is not being discharged yet since he needs 2 days of IV abx.

## 2023-01-08 NOTE — CARE PLAN
Problem: Knowledge Deficit - Standard  Goal: Patient and family/care givers will demonstrate understanding of plan of care, disease process/condition, diagnostic tests and medications  Outcome: Progressing teaching about wound care     Problem: Fluid Volume  Goal: Fluid volume balance will be maintained  Outcome: Progressing good UO measured and recorded   The patient is Stable - Low risk of patient condition declining or worsening    Shift Goals  Clinical Goals: wound care and teaching  Patient Goals: rest pain management  Family Goals: MICHELINE    Progress made toward(s) clinical / shift goals:  Assumed care of patient at 0715. Received bedside report from night shift RN. Bed is locked and lowest position, call light within reach. Treaded socks in place. Patient updated on plan of care, no complaints or pain at this time. White board updated. Pt AxOx4 Patient breathing pattern is unlabored. Pt is medical. Dressing on foot CDI, due be changed today see order. All needs met at this time. Will continue care and monitoring as ordered.

## 2023-01-08 NOTE — PROGRESS NOTES
Pharmacy Vancomycin Kinetics Note for 1/8/2023     67 y.o. male on Vancomycin day # 1     Vancomycin Indication (AUC Dosing):  (Diabetic MRSA foot infection)      Provider specified end date: 01/15/23    Active Antibiotics (From admission, onward)      Ordered     Ordering Provider       Sun Jan 8, 2023 12:19 PM    01/08/23 1219  vancomycin (VANCOCIN) 3,000 mg in  mL IVPB  (vancomycin (VANCOCIN) IV (LD + Maintenance))  ONCE         DRISS Mata Jan 8, 2023 12:08 PM    01/08/23 1208  amoxicillin-clavulanate (AUGMENTIN) 875-125 MG per tablet 1 Tablet  EVERY 12 HOURS         Jesus Cardenas M.D.    01/08/23 1208  MD Alert...Vancomycin per Pharmacy  PHARMACY TO DOSE        Note to Pharmacy: Diabetic MRSA foot infection   Question:  Indication(s) for vancomycin?  Answer:  Skin and soft tissue infection    Jesus Cardenas M.D.            Dosing Weight: 116 kg (255 lb 11.7 oz)      Admission History: Admitted on 1/4/2023 for Diabetic foot infection (HCC) [E11.628, L08.9]  Pertinent history: 67 year old male started on vancomycin for diabetic MRSA foot infection    Allergies:     Patient has no known allergies.     Pertinent cultures to date:     Results       Procedure Component Value Units Date/Time    Fungal Culture [[id number]] Collected: 01/05/23 1946    Order Status: Completed Specimen: Wound Updated: 01/08/23 1049     Significant Indicator NEG     Source WND     Site Left Foot     Culture Result Culture in progress.     Fungal Smear Results No fungal elements seen.    Narrative:      Surgery - swabs received    CULTURE WOUND W/ GRAM STAIN [[id number]]  (Abnormal) Collected: 01/05/23 1946    Order Status: Completed Specimen: Wound Updated: 01/08/23 1049     Significant Indicator POS     Source WND     Site Left Foot     Culture Result -     Gram Stain Result Moderate WBCs.  Few Gram positive cocci.       Culture Result Methicillin Resistant Staphylococcus aureus  Light growth  See  previous culture for sensitivity report.        Coagulase-negative Staphylococcus species  Staphylococcus piscifermentans        Enterococcus faecalis      Staphylococcus epidermidis    Narrative:      Surgery - swabs received    Anaerobic Culture [[id number]] Collected: 01/05/23 1946    Order Status: Completed Specimen: Wound Updated: 01/08/23 1049     Significant Indicator NEG     Source WND     Site Left Foot     Culture Result Culture in progress.    Narrative:      Surgery - swabs received    GRAM STAIN [[id number]]  (Abnormal) Collected: 01/05/23 1946    Order Status: Completed Specimen: Wound Updated: 01/07/23 2003     Significant Indicator .     Source WND     Site Left Foot     Gram Stain Result Moderate WBCs.  Few Gram positive cocci.      Narrative:      Surgery - swabs received    Fungal Smear [[id number]] Collected: 01/05/23 1946    Order Status: Completed Specimen: Wound Updated: 01/07/23 2003     Significant Indicator NEG     Source WND     Site Left Foot     Fungal Smear Results No fungal elements seen.    Narrative:      Surgery - swabs received    CULTURE STOOL [[id number]] Collected: 01/05/23 1147    Order Status: Completed Specimen: Stool Updated: 01/07/23 1701     Significant Indicator NEG     Source STL     Site STOOL     Culture Result No enteric pathogens isolated.  NOTE:  Stool cultures are screened for Shiga Toxins 1 and 2,  Salmonella, Shigella, Campylobacter, Aeromonas,  Plesiomonas, and Vibrio.       [facility] Negative for Shiga Toxin 1 and 2.     Campylobactor Antigen --     Negative for Campylobacter Antigen.  Test results are to be used in conjunction with information  available from the patient clinical evaluation and other  diagnostic procedures.      Narrative:      Contact  Collected By: [id number] SHANTE APRIL  For adult patients only; culture includes screen for  Campylobacter.  Contact    BLOOD CULTURE [[id number]] Collected: 01/06/23 1604    Order Status: Completed Specimen: Blood from  "Peripheral Updated: 01/07/23 0754     Significant Indicator NEG     Source BLD     Site PERIPHERAL     Culture Result No Growth  Note: Blood cultures are incubated for 5 days and  are monitored continuously.Positive blood cultures  are called to the RN and reported as soon as  they are identified.      Narrative:      Contact  Per Hospital Policy: Only change Specimen Src: to \"Line\" if  specified by physician order.  Left AC    BLOOD CULTURE [[id number]] Collected: 01/06/23 1640    Order Status: Completed Specimen: Blood from Peripheral Updated: 01/07/23 0754     Significant Indicator NEG     Source BLD     Site PERIPHERAL     Culture Result No Growth  Note: Blood cultures are incubated for 5 days and  are monitored continuously.Positive blood cultures  are called to the RN and reported as soon as  they are identified.      Narrative:      Contact  Per Hospital Policy: Only change Specimen Src: to \"Line\" if  specified by physician order.  Left AC    [facility](Shiga Toxin)Detection [[id number]] Collected: 01/05/23 1147    Order Status: Completed Specimen: Stool Updated: 01/06/23 1744     Significant Indicator NEG     Source STL     Site STOOL     [facility] Negative for Shiga Toxin 1 and 2.    Narrative:      Contact  Collected By: [id number] SHANTE APRIL  For adult patients only; culture includes screen for  Campylobacter.  Contact    URINALYSIS [[id number]]     Order Status: No result Specimen: Urine, Clean Catch     S. Aureus By PCR, Nasal Complete [[id number]]     Order Status: No result Specimen: Respirate from Respiratory             Labs:     Estimated Creatinine Clearance: 182.1 mL/min (by C-G formula based on SCr of 0.54 mg/dL).  Recent Labs     01/06/23 0415 01/07/23 0630 01/08/23 0927   WBC 14.1* 9.7 9.0   NEUTSPOLYS 81.30*  --   --      Recent Labs     01/06/23 0415 01/07/23 0630 01/08/23 0927   BUN 6* 7* 6*   CREATININE 0.59 0.48* 0.54   ALBUMIN  --  2.5* 2.9*       Intake/Output Summary (Last 24 hours) at 1/8/2023 " "1346  Last data filed at 2023 1000  Gross per 24 hour   Intake 1030 ml   Output 3330 ml   Net -2300 ml      BP (!) 145/70   Pulse 74   Temp 36.9 °C (98.5 °F) (Temporal)   Resp 18   Ht 1.88 m (6' 2.02\")   Wt 116 kg (256 lb 2.8 oz)   SpO2 90%  Temp (24hrs), Av.1 °C (98.7 °F), Min:36.7 °C (98.1 °F), Max:37.3 °C (99.2 °F)      List concerns for Vancomycin clearance:     Age;Obesity    Pharmacokinetics:     AUC kinetics:   Ke (hr ^-1): 0.1438 hr^-1  Half life: 4.82 hr  Clearance: 5.606  Estimated TDD: 2803  Estimated Dose: 794  Estimated interval: 6.8        A/P:     -  Vancomycin dose: vancomycin 3000 mg IV loading dose followed by 1250 mg IV every 12 hours     -  Next vancomycin level(s): defer    -  Predicted vancomycin AUC from initial AUC test calculator: 446 mg·hr/L    -  Comments:     Amita Fuentes, PharmD    "

## 2023-01-08 NOTE — CONSULTS
DATE OF SERVICE:  01/08/2023     INFECTIOUS DISEASE CONSULTATION     REQUESTING PHYSICIAN:  Skinny Diggs M.D.     IDENTIFICATION:  A 67-year-old patient seen for antibiotic advice for diabetic   foot infection.     HISTORY OF PRESENT ILLNESS:  This is a patient who  with type 2 diabetes   complicated by neuropathy in his lower extremities.  Apparently developed a   blister underneath his left first great toe.  He then was walking at work with   a bottle top inside his shoe and this caused development of an ulcer.  The   ulcer progressed to the point that it was swollen.  He had drainage and   erythema to the leg and he came to the hospital. At that time, he had a   leukocytosis.  An MRI was done, which showed no abscess or osteomyelitis.  He   was placed on linezolid and Unasyn and he was taken to the operating room by   Dr. García where a purulent abscess was found. The wound was packed.  Dr. García then recommend the patient consider amputation, but he now definitely   refused it and I am seeing him today for antibiotic advice.  He had a fever   after surgery, but presently is afebrile.  His wound has been dressed, but he   noted increasing drainage and redness in the last day.He is not a smoker.  He has good circulation to   his feet. His major underlying problem has just been the diabetes, for which   he was on metformin, but he has stopped taking that recently.  The patient   hopes to leave [place] the next day or two, accompanied by his daughter who lives   in [place], where he hopes to take up residence.     ALLERGIES:  NO KNOWN ANTIBIOTIC ALLERGIES.     SOCIAL HISTORY:  He lives with his 2 dogs, which he takes for a walk about 8   miles a day.  He does not smoke or drink and he is retired.     MEDICATIONS:  At present include IV ampicillin/sulbactam and oral linezolid as   well as diabetic treatment.     REVIEW OF SYSTEMS:  Negative except as noted above.  He also has no history of   any recent infection,  any antibiotic use, history of staph, etc. And has not   been exposed to anybody he is aware of that has staph infections.     PHYSICAL EXAMINATION:  GENERAL:  He is a middle-aged male who is presently 36.9 degrees.  VITAL SIGNS:  His blood pressure is 145/70.  He is not on oxygen.  EXTREMITIES:  Physical exam focusing on his foot, the dorsum is significantly   erythematous about correction up the dorsal aspect.  His plantar aspect, he has a   wound packing and there is some drainage and swelling.     LABORATORY DATA:  His white count on admission was elevated to 18.9.  It is   now down to 9.0.  His chemistry panel:  Sodium 133.  His BUN and creatinine   normal.  Blood sugar was 257 on admission, now it is 130. His glycohemoglobin   was 9 on admission.  Lactic acid is normal.  His albumin is 3.1 on admission,   now down to 2.9 and his liver tests are normal.     MICROBIOLOGY:  Blood cultures are negative.  He has cultures done   intraoperatively, which showed MRSA, group B strep and an anaerobic   gram-positive shea.  On admission, the patient had drainage culture, but showed   methicillin-resistant Staph aureus, susceptible to vancomycin OUMOU of  1, Bactrim, tetracycline, daptomycin, clindamycin sensitive. He also had   moderate growth of group B strep and anaerobic gram-positive shea, heavy   growth.  Intraoperative cultures from the fifth again showed MRSA,   Enterococcus, and some coag-negative staph.     IMAGING:  MRI of the foot did not show osteomyelitis or abscess.     ASSESSMENT:  Complicated diabetic foot infection of the first metatarsal head   and tissue. So far, MRI did not show evidence of bone infection, but at the   present time, despite antibiotics, there is still continued drainage and   erythema.  The patient is eager to leave, but I have encouraged him to stay   another 2-3 days to get this infection under better control before we switch him to   oral regimen since he is refusing amputation.      RECOMMENDATIONS:  1.  Discontinue linezolid.  2.  Switch to IV vancomycin with pharmacy dosing.  3.  We can stop the Unasyn and switch him to Augmentin, which will have 100%   coverage against group B strep and anaerobes.  4.  When the patient is doing better or insists on leaving, he already has a   special orthotic shoes, so he will be nonweightbearing on his foot and then we   can switch him to Augmentin 875 b.i.d., which I would continue probably for   2-3 months combined with either Bactrim double strength 1 tablet p.o. b.i.d.,   also for 2-3 months or doxycycline 100 mg b.i.d. for 2-3 months. Both   doxycycline and Bactrim have good bone penetration and we will cover the MRSA   very well.  Obviously, the patient also needs to be adherent to   nonweightbearing and better diabetic control and good wound care.  I have   discussed this in detail with him and his family and the family is encouraging   him to stay to get this infection  under a little bit better control before he   leaves and moves to [place].        ______________________________  MD SOLOMON BENITO/FABIÁN    DD:  01/08/2023 12:18  DT:  01/08/2023 12:50    Job#:  [id number]

## 2023-01-08 NOTE — DISCHARGE PLANNING
note:  Dr. Diggs notified CM that pt will be discharged today after they talk to ID. If pt does not need continued IV abx then pt will be dc and he will need to go to a hotel.

## 2023-01-08 NOTE — DISCHARGE PLANNING
"  Met with pt. He does not want to be discharged today instead his daughter is coming tonight from [place] so they can fly to [place] together and pt can get a second opinion. Discussed IMM and pt declined to sign this. Pt said \" I dont want to appeal the discharge instead I want to be discharged tomorrow because my daughter is coming tonight to take me to [place] tomorrow.\".  Pt said that he wants a seconds opinion on whether he needs an amputation or not.     Notified MD.     Care Transition Team Assessment    Information Source  Orientation Level: Oriented X4  Information Given By: Patient  Who is responsible for making decisions for patient? : Patient    Elopement Risk  Legal Hold: No  Ambulatory or Self Mobile in Wheelchair: No-Not an Elopement Risk  Disoriented: No  Psychiatric Symptoms: None  History of Wandering: No  Elopement this Admit: No  Vocalizing Wanting to Leave: No  Displays Behaviors, Body Language Wanting to Leave: No-Not at Risk for Elopement  Elopement Risk: Not at Risk for Elopement    Interdisciplinary Discharge Planning  Does Admitting Nurse Feel This Could be a Complex Discharge?: No  Primary Care Physician: Jose  Lives with - Patient's Self Care Capacity: Alone and Able to Care For Self  Patient or legal guardian wants to designate a caregiver: No  Support Systems: Children, Family Member(s)  Housing / Facility: [address]  Do You Take your Prescribed Medications Regularly: Yes  Able to Return to Previous ADL's: Future Time w/Therapy  Mobility Issues: Yes  Prior Services: None, Home-Independent  Patient Prefers to be Discharged to:: Home  Assistance Needed: Yes  Durable Medical Equipment: Not Applicable    Discharge Preparedness  What is your plan after discharge?: Home with help  What are your discharge supports?: Child, Sibling  Prior Functional Level: Ambulatory  Difficulity with ADLs: None  Difficulity with IADLs: None    Functional Assesment  Prior Functional Level: " Ambulatory    Finances  Financial Barriers to Discharge: No  Prescription Coverage: Yes    Vision / Hearing Impairment  Vision Impairment : No  Hearing Impairment : No    Values / Beliefs / Concerns  Values / Beliefs Concerns : No    Advance Directive  Advance Directive?: None    Domestic Abuse  Have you ever been the victim of abuse or violence?: No  Physical Abuse or Sexual Abuse: No  Verbal Abuse or Emotional Abuse: No  Possible Abuse/Neglect Reported to:: Not Applicable    Discharge Risks or Barriers  Discharge risks or barriers?: Post-acute placement / services, Complex medical needs  Patient risk factors: Complex medical needs, Vulnerable adult    Anticipated Discharge Information  Discharge Disposition: Discharged to home/self care (01)

## 2023-01-08 NOTE — CARE PLAN
The patient is Stable - Low risk of patient condition declining or worsening    Shift Goals  Clinical Goals: wound care  Patient Goals: rest  Family Goals: MICHELINE    Progress made toward(s) clinical / shift goals:    Problem: Knowledge Deficit - Standard  Goal: Patient and family/care givers will demonstrate understanding of plan of care, disease process/condition, diagnostic tests and medications  Outcome: Progressing  Note: Pt actively participates in POC. Pt and board updated, all questions and concerns answered. Pt encouraged to voice all questions and concerns.      Problem: Respiratory  Goal: Patient will achieve/maintain optimum respiratory ventilation and gas exchange  Outcome: Progressing  Note: No signs or symptoms of respiratory distress. No complaints of Sob at this time. O2 saturation WDL at this time.       Patient is not progressing towards the following goals:

## 2023-01-08 NOTE — PROGRESS NOTES
"[facility] Internal Medicine Daily Progress Note    Date of Service  1/7/2023    [facility] Team: R IM Green Team   Attending: Skinny Diggs M.d.  Senior Resident: Dr. Benavidez  Intern:  Dr. Mast  Contact Number: [phone]    Chief Complaint  Jimmy Amaya is a 67 y.o. male admitted 1/4/2023 with Sepsis secondary to left diabetic foot ulcer    Hospital Course  Jimmy Amaya is a 67 y.o. male with past medical history of uncontrolled diabetes (pt states ran out of metformin) who presented 1/4/2023 as a transfer from [facility] for L diabetic foot ulcer requiring LPS service.  Patient was placed on Unasyn and Linezolid with good benefit. Ortho completed an I&D on 1/5/23, but likely will have to go back to OR for further I&D or TMA.     Interval Problem Update  -No acute events overnight  -Patient states \"would rather die than get the amputation\".   -Foot continues to clinically improve from an infectious point of view.       I have discussed this patient's plan of care and discharge plan at IDT rounds today with Case Management, Nursing, Nursing leadership, and other members of the IDT team.    Consultants/Specialty  orthopedics and Limb preservation service    Code Status  Full Code    Disposition  Patient is not medically cleared for discharge.   Anticipate discharge to to home with close outpatient follow-up.  I have placed the appropriate orders for post-discharge needs.    Review of Systems  Review of Systems   Constitutional:  Negative for chills, fever and malaise/fatigue.   HENT:  Negative for hearing loss.    Eyes:  Negative for blurred vision and double vision.   Respiratory:  Negative for cough, shortness of breath and wheezing.    Cardiovascular:  Positive for leg swelling (appears improved along superior aspects.). Negative for chest pain.   Gastrointestinal:  Negative for abdominal pain.   Genitourinary:  Negative for dysuria.   Musculoskeletal:  Negative for joint pain.   Skin:  Positive for rash. " Negative for itching.   Neurological:  Negative for dizziness, loss of consciousness and headaches.   Psychiatric/Behavioral:  Positive for depression. The patient is nervous/anxious. The patient does not have insomnia.       Physical Exam  Temp:  [36.6 °C (97.9 °F)-37.6 °C (99.6 °F)] 37.2 °C (99 °F)  Pulse:  [71-86] 77  Resp:  [18-19] 18  BP: (121-133)/(60-74) 133/74  SpO2:  [89 %-91 %] 90 %    Physical Exam  Vitals and nursing note reviewed.   Constitutional:       General: He is not in acute distress.     Appearance: Normal appearance. He is obese.   HENT:      Head: Normocephalic and atraumatic.      Right Ear: External ear normal.      Left Ear: External ear normal.   Eyes:      General: No scleral icterus.     Conjunctiva/sclera: Conjunctivae normal.   Cardiovascular:      Rate and Rhythm: Normal rate and regular rhythm.      Heart sounds: Normal heart sounds. No murmur heard.  Pulmonary:      Effort: Pulmonary effort is normal. No respiratory distress.   Abdominal:      General: Abdomen is flat. There is no distension.      Tenderness: There is no abdominal tenderness.   Musculoskeletal:        Feet:    Feet:      Right foot:      Toenail Condition: Right toenails are abnormally thick.      Left foot:      Skin integrity: Ulcer and erythema present. No skin breakdown.      Toenail Condition: Left toenails are abnormally thick.      Comments: Superficial erythema, edema seems much improved.   Clear stitches present on medial aspect.  At base of great toe is a ~5cm deep wound that basically progresses to the skin on the dorsal aspect.   Tophus still present.   Skin:     General: Skin is warm.      Coloration: Skin is not jaundiced.      Findings: Erythema and rash present.   Neurological:      General: No focal deficit present.      Mental Status: He is alert and oriented to person, place, and time. Mental status is at baseline.   Psychiatric:         Mood and Affect: Mood normal.         Behavior: Behavior  normal.         Thought Content: Thought content normal.         Judgment: Judgment normal.       Fluids    Intake/Output Summary (Last 24 hours) at 1/7/2023 1726  Last data filed at 1/7/2023 1400  Gross per 24 hour   Intake 1889.12 ml   Output 2500 ml   Net -610.88 ml       Laboratory  Recent Labs     01/05/23  0209 01/06/23 0415 01/07/23  0630   WBC 15.8* 14.1* 9.7   RBC 4.56* 4.33* 4.41*   HEMOGLOBIN 12.6* 12.2* 12.2*   HEMATOCRIT 38.4* 36.9* 37.5*   MCV 84.2 85.2 85.0   MCH 27.6 28.2 27.7   MCHC 32.8* 33.1* 32.5*   RDW 42.4 42.6 42.7   PLATELETCT 216 209 228   MPV 10.1 10.0 9.6     Recent Labs     01/05/23  0209 01/06/23  0415 01/07/23  0630   SODIUM 130* 131* 133*   POTASSIUM 3.3* 3.6 3.8   CHLORIDE 98 98 100   CO2 23 24 26   GLUCOSE 201* 208* 168*   BUN 8 6* 7*   CREATININE 0.54 0.59 0.48*   CALCIUM 8.1* 8.1* 8.1*                   Imaging  US-ONUR SINGLE LEVEL BILAT   Final Result           Assessment/Plan  Problem Representation:    * Sepsis due to methicillin resistant Staphylococcus aureus (MRSA) without acute organ dysfunction (HCC)- (present on admission)  Assessment & Plan  Criteria: febrile 101.3, WBC=18.9, tachycardia 101, Clear source of Left diabetic foot ulcer,  Present on admission at [facility].     -Afebrile 1/7/23. Leukocytosis finally (1/7/23) trending down 9.7. Source control underway  -Antibiotics: Unasyn, Linezolid (MRSA positive cultures). Continuing as likely a polymicrobial wound, technically without source control and with potential future procedures.     Left diabetic foot ulcer and infection- (present on admission)  Assessment & Plan  Elevated crp during admission at [facility] prior to transfer  MRI negative for osteomyelitis. ONUR shows normal arterial flow.     - Wound care consulted, following.  - wound culture positive for MRSA. Continue unasyn and linezolid  - leukocytosis improving, blood cultures NGTD  -I&D 1/05/23, successful.  -Starting heparin 5000 TID,  as unlikely procedure this weekend. Ortho will re-evaulate for another I&D vs. TMA on 1/09/23.      Type 2 diabetes mellitus with circulatory disorder, without long-term current use of insulin (HCC)- (present on admission)  Assessment & Plan  Not on home med, pt states ran out of metformin. Last A1C 9.3% on 1/2/22    - insulin glargine 10 U   - sliding scale insulin   - hypoglycemia protocol  - diabetic education     Hypokalemia- (present on admission)  Assessment & Plan  Replace for goal greater than 4.    MRSA cellulitis of left foot- (present on admission)  Assessment & Plan  As per wound cultures.    Hyponatremia- (present on admission)  Assessment & Plan  Na 130 on presentation, improved with NS infusion  -Resolved           VTE prophylaxis: SCDs/TEDs and pharmacologic prophylaxis contraindicated due to Procedure    I have performed a physical exam and reviewed and updated ROS and Plan today (1/7/2023). In review of yesterday's note (1/6/2023), there are no changes except as documented above.      Discharge pending I&D, Infection resolution.

## 2023-01-09 ENCOUNTER — PHARMACY VISIT (OUTPATIENT)
Dept: PHARMACY | Facility: MEDICAL CENTER | Age: 68
End: 2023-01-09
Payer: COMMERCIAL

## 2023-01-09 VITALS
TEMPERATURE: 97.3 F | OXYGEN SATURATION: 90 % | HEART RATE: 74 BPM | DIASTOLIC BLOOD PRESSURE: 79 MMHG | HEIGHT: 74 IN | RESPIRATION RATE: 18 BRPM | SYSTOLIC BLOOD PRESSURE: 137 MMHG | BODY MASS INDEX: 32.88 KG/M2 | WEIGHT: 256.17 LBS

## 2023-01-09 LAB
ALBUMIN SERPL BCP-MCNC: 2.7 G/DL (ref 3.2–4.9)
ALBUMIN/GLOB SERPL: 0.7 G/DL
ALP SERPL-CCNC: 115 U/L (ref 30–99)
ALT SERPL-CCNC: 35 U/L (ref 2–50)
ANION GAP SERPL CALC-SCNC: 10 MMOL/L (ref 7–16)
AST SERPL-CCNC: 26 U/L (ref 12–45)
BACTERIA BLD CULT: NORMAL
BACTERIA BLD CULT: NORMAL
BACTERIA WND AEROBE CULT: ABNORMAL
BASOPHILS # BLD AUTO: 0.6 % (ref 0–1.8)
BASOPHILS # BLD: 0.05 K/UL (ref 0–0.12)
BILIRUB SERPL-MCNC: 0.6 MG/DL (ref 0.1–1.5)
BUN SERPL-MCNC: 6 MG/DL (ref 8–22)
CALCIUM ALBUM COR SERPL-MCNC: 9.4 MG/DL (ref 8.5–10.5)
CALCIUM SERPL-MCNC: 8.4 MG/DL (ref 8.5–10.5)
CHLORIDE SERPL-SCNC: 101 MMOL/L (ref 96–112)
CO2 SERPL-SCNC: 23 MMOL/L (ref 20–33)
CREAT SERPL-MCNC: 0.5 MG/DL (ref 0.5–1.4)
EOSINOPHIL # BLD AUTO: 0.17 K/UL (ref 0–0.51)
EOSINOPHIL NFR BLD: 2.2 % (ref 0–6.9)
ERYTHROCYTE [DISTWIDTH] IN BLOOD BY AUTOMATED COUNT: 42.4 FL (ref 35.9–50)
GFR SERPLBLD CREATININE-BSD FMLA CKD-EPI: 112 ML/MIN/1.73 M 2
GLOBULIN SER CALC-MCNC: 3.7 G/DL (ref 1.9–3.5)
GLUCOSE BLD STRIP.AUTO-MCNC: 129 MG/DL (ref 65–99)
GLUCOSE SERPL-MCNC: 126 MG/DL (ref 65–99)
GRAM STN SPEC: ABNORMAL
HCT VFR BLD AUTO: 41.4 % (ref 42–52)
HGB BLD-MCNC: 13.7 G/DL (ref 14–18)
IMM GRANULOCYTES # BLD AUTO: 0.1 K/UL (ref 0–0.11)
IMM GRANULOCYTES NFR BLD AUTO: 1.3 % (ref 0–0.9)
LYMPHOCYTES # BLD AUTO: 1.54 K/UL (ref 1–4.8)
LYMPHOCYTES NFR BLD: 19.6 % (ref 22–41)
MCH RBC QN AUTO: 28.1 PG (ref 27–33)
MCHC RBC AUTO-ENTMCNC: 33.1 G/DL (ref 33.7–35.3)
MCV RBC AUTO: 84.8 FL (ref 81.4–97.8)
MONOCYTES # BLD AUTO: 0.6 K/UL (ref 0–0.85)
MONOCYTES NFR BLD AUTO: 7.6 % (ref 0–13.4)
NEUTROPHILS # BLD AUTO: 5.39 K/UL (ref 1.82–7.42)
NEUTROPHILS NFR BLD: 68.7 % (ref 44–72)
NRBC # BLD AUTO: 0 K/UL
NRBC BLD-RTO: 0 /100 WBC
PLATELET # BLD AUTO: 284 K/UL (ref 164–446)
PMV BLD AUTO: 9.7 FL (ref 9–12.9)
POTASSIUM SERPL-SCNC: 4 MMOL/L (ref 3.6–5.5)
PROT SERPL-MCNC: 6.4 G/DL (ref 6–8.2)
RBC # BLD AUTO: 4.88 M/UL (ref 4.7–6.1)
SIGNIFICANT IND 70042: ABNORMAL
SIGNIFICANT IND 70042: NORMAL
SIGNIFICANT IND 70042: NORMAL
SITE SITE: ABNORMAL
SITE SITE: NORMAL
SITE SITE: NORMAL
SODIUM SERPL-SCNC: 134 MMOL/L (ref 135–145)
SOURCE SOURCE: ABNORMAL
SOURCE SOURCE: NORMAL
SOURCE SOURCE: NORMAL
WBC # BLD AUTO: 7.9 K/UL (ref 4.8–10.8)

## 2023-01-09 PROCEDURE — 85025 COMPLETE CBC W/AUTO DIFF WBC: CPT

## 2023-01-09 PROCEDURE — 700105 HCHG RX REV CODE 258: Performed by: INTERNAL MEDICINE

## 2023-01-09 PROCEDURE — 80053 COMPREHEN METABOLIC PANEL: CPT

## 2023-01-09 PROCEDURE — 82962 GLUCOSE BLOOD TEST: CPT

## 2023-01-09 PROCEDURE — 700111 HCHG RX REV CODE 636 W/ 250 OVERRIDE (IP): Performed by: INTERNAL MEDICINE

## 2023-01-09 PROCEDURE — A9270 NON-COVERED ITEM OR SERVICE: HCPCS | Performed by: INTERNAL MEDICINE

## 2023-01-09 PROCEDURE — RXMED WILLOW AMBULATORY MEDICATION CHARGE

## 2023-01-09 PROCEDURE — 99239 HOSP IP/OBS DSCHRG MGMT >30: CPT | Mod: GC | Performed by: STUDENT IN AN ORGANIZED HEALTH CARE EDUCATION/TRAINING PROGRAM

## 2023-01-09 PROCEDURE — 700111 HCHG RX REV CODE 636 W/ 250 OVERRIDE (IP)

## 2023-01-09 PROCEDURE — A9270 NON-COVERED ITEM OR SERVICE: HCPCS | Performed by: FAMILY MEDICINE

## 2023-01-09 PROCEDURE — 700102 HCHG RX REV CODE 250 W/ 637 OVERRIDE(OP): Performed by: FAMILY MEDICINE

## 2023-01-09 PROCEDURE — 700102 HCHG RX REV CODE 250 W/ 637 OVERRIDE(OP): Performed by: INTERNAL MEDICINE

## 2023-01-09 RX ORDER — AMOXICILLIN AND CLAVULANATE POTASSIUM 875; 125 MG/1; MG/1
1 TABLET, FILM COATED ORAL 2 TIMES DAILY
Qty: 180 TABLET | Refills: 0 | Status: SHIPPED | OUTPATIENT
Start: 2023-01-09 | End: 2023-04-09

## 2023-01-09 RX ORDER — GLUCOSAMINE HCL/CHONDROITIN SU 500-400 MG
CAPSULE ORAL
Qty: 100 EACH | Refills: 0 | Status: SHIPPED | OUTPATIENT
Start: 2023-01-09

## 2023-01-09 RX ORDER — DOXYCYCLINE 100 MG/1
100 CAPSULE ORAL 2 TIMES DAILY
Qty: 180 CAPSULE | Refills: 0 | Status: SHIPPED | OUTPATIENT
Start: 2023-01-09 | End: 2023-04-09

## 2023-01-09 RX ORDER — LANCETS 30 GAUGE
EACH MISCELLANEOUS
Qty: 100 EACH | Refills: 0 | Status: SHIPPED | OUTPATIENT
Start: 2023-01-09

## 2023-01-09 RX ORDER — BLOOD-GLUCOSE METER
KIT MISCELLANEOUS
Qty: 1 KIT | Refills: 0 | Status: SHIPPED | OUTPATIENT
Start: 2023-01-09

## 2023-01-09 RX ORDER — INSULIN GLARGINE 100 [IU]/ML
15 INJECTION, SOLUTION SUBCUTANEOUS EVERY EVENING
Qty: 1 EACH | Refills: 2 | Status: SHIPPED | OUTPATIENT
Start: 2023-01-09 | End: 2023-01-09 | Stop reason: SDUPTHER

## 2023-01-09 RX ORDER — AMOXICILLIN AND CLAVULANATE POTASSIUM 875; 125 MG/1; MG/1
1 TABLET, FILM COATED ORAL 2 TIMES DAILY
Qty: 120 TABLET | Refills: 0 | Status: SHIPPED | OUTPATIENT
Start: 2023-01-09 | End: 2023-01-09 | Stop reason: SDUPTHER

## 2023-01-09 RX ORDER — INSULIN GLARGINE 100 [IU]/ML
15 INJECTION, SOLUTION SUBCUTANEOUS EVERY EVENING
Qty: 15 ML | Refills: 1 | Status: SHIPPED | OUTPATIENT
Start: 2023-01-09

## 2023-01-09 RX ORDER — OXYCODONE HYDROCHLORIDE 5 MG/1
5 TABLET ORAL EVERY 8 HOURS PRN
Qty: 10 TABLET | Refills: 0 | Status: SHIPPED | OUTPATIENT
Start: 2023-01-09 | End: 2023-01-13

## 2023-01-09 RX ORDER — DOXYCYCLINE 100 MG/1
100 CAPSULE ORAL 2 TIMES DAILY
Qty: 120 CAPSULE | Refills: 0 | Status: SHIPPED | OUTPATIENT
Start: 2023-01-09 | End: 2023-01-09 | Stop reason: SDUPTHER

## 2023-01-09 RX ADMIN — HEPARIN SODIUM 5000 UNITS: 5000 INJECTION, SOLUTION INTRAVENOUS; SUBCUTANEOUS at 04:42

## 2023-01-09 RX ADMIN — DAKIN'S SOLUTION 0.125% (QUARTER STRENGTH) 1 ML: 0.12 SOLUTION at 04:45

## 2023-01-09 RX ADMIN — VANCOMYCIN HYDROCHLORIDE 1250 MG: 500 INJECTION, POWDER, LYOPHILIZED, FOR SOLUTION INTRAVENOUS at 04:44

## 2023-01-09 RX ADMIN — AMOXICILLIN AND CLAVULANATE POTASSIUM 1 TABLET: 875; 125 TABLET, FILM COATED ORAL at 04:43

## 2023-01-09 RX ADMIN — OXYCODONE HYDROCHLORIDE 2.5 MG: 5 TABLET ORAL at 03:41

## 2023-01-09 ASSESSMENT — PAIN DESCRIPTION - PAIN TYPE
TYPE: ACUTE PAIN
TYPE: ACUTE PAIN

## 2023-01-09 NOTE — CARE PLAN
The patient is Stable - Low risk of patient condition declining or worsening    Shift Goals  Clinical Goals: wound care  Patient Goals: sleep  Family Goals: MICHELINE    Progress made toward(s) clinical / shift goals:    Problem: Knowledge Deficit - Standard  Goal: Patient and family/care givers will demonstrate understanding of plan of care, disease process/condition, diagnostic tests and medications  Outcome: Progressing  Note: Pt actively participates in POC. Pt and board updated, all questions and concerns answered. Pt encouraged to voice all questions and concerns. Explained to pt change in IV ABX.      Problem: Pain - Standard  Goal: Alleviation of pain or a reduction in pain to the patient’s comfort goal  Outcome: Progressing  Note: No complaints of pain at this time.       Patient is not progressing towards the following goals:

## 2023-01-09 NOTE — DISCHARGE INSTRUCTIONS
Make sure you follow up with wound care immediately for your foot. Establish care with a PCP to obtain referral to see a specialist.  If your wound becomes exquisitely painful and is worsening or spreading, go to the ED immediately.  If you are spiking fevers or feeling ill, go to the ED immediately.   Greatest concern is if the infection develops into necrotizing fascitis which is a life threatening emergency.   Please be adherent to nonweightberaing and better diabetic control and good wound care. Please take your antibiotics as directed.

## 2023-01-09 NOTE — DISCHARGE PLANNING
Received Choice Form @: 1002  Agency/ Facility Name: [facility]  Referral Sent per Choice Form @: Not sent as there are no orders or F2F

## 2023-01-09 NOTE — PROGRESS NOTES
[facility] Internal Medicine Daily Progress Note    Date of Service  1/8/2023    R Team: R IM Green Team   Attending: Skinny Diggs M.d.  Senior Resident: Dr. Benavidez  Intern:  Dr. Mast  Contact Number: [phone]    Chief Complaint  Jimmy Amaya is a 67 y.o. male admitted 1/4/2023 with Sepsis secondary to left diabetic foot ulcer    Hospital Course  Jimmy Amaya is a 67 y.o. male with past medical history of uncontrolled diabetes (pt states ran out of metformin) who presented 1/4/2023 as a transfer from [facility] for L diabetic foot ulcer requiring LPS service.  Patient was placed on Unasyn and Linezolid with good benefit. Ortho completed an I&D on 1/5/23, but likely will have to go back to OR for further I&D or TMA.     Interval Problem Update   Patient declines amputation        I have discussed this patient's plan of care and discharge plan at IDT rounds today with Case Management, Nursing, Nursing leadership, and other members of the IDT team.    Consultants/Specialty  orthopedics and Limb preservation service    Code Status  Full Code    Disposition  Patient is not medically cleared for discharge.   Anticipate discharge to to home with close outpatient follow-up.  I have placed the appropriate orders for post-discharge needs.    Review of Systems  Review of Systems   Constitutional:  Negative for chills, fever and malaise/fatigue.   HENT:  Negative for hearing loss.    Eyes:  Negative for blurred vision and double vision.   Respiratory:  Negative for cough, shortness of breath and wheezing.    Cardiovascular:  Positive for leg swelling (appears improved along superior aspects.). Negative for chest pain.   Gastrointestinal:  Negative for abdominal pain.   Genitourinary:  Negative for dysuria.   Musculoskeletal:  Negative for joint pain.   Skin:  Positive for rash. Negative for itching.   Neurological:  Negative for dizziness, loss of consciousness and headaches.   Psychiatric/Behavioral:  Positive for  depression. The patient is nervous/anxious. The patient does not have insomnia.       Physical Exam  Temp:  [36.7 °C (98.1 °F)-37.3 °C (99.2 °F)] 37.2 °C (99 °F)  Pulse:  [74-85] 76  Resp:  [18] 18  BP: (127-145)/(69-81) 135/81  SpO2:  [89 %-98 %] 91 %    Physical Exam  Vitals and nursing note reviewed.   Constitutional:       General: He is not in acute distress.     Appearance: Normal appearance. He is obese.   HENT:      Head: Normocephalic and atraumatic.      Right Ear: External ear normal.      Left Ear: External ear normal.   Eyes:      General: No scleral icterus.     Conjunctiva/sclera: Conjunctivae normal.   Cardiovascular:      Rate and Rhythm: Normal rate and regular rhythm.      Heart sounds: Normal heart sounds. No murmur heard.  Pulmonary:      Effort: Pulmonary effort is normal. No respiratory distress.   Abdominal:      General: Abdomen is flat. There is no distension.      Tenderness: There is no abdominal tenderness.   Musculoskeletal:        Feet:    Feet:      Right foot:      Toenail Condition: Right toenails are abnormally thick.      Left foot:      Skin integrity: Ulcer and erythema present. No skin breakdown.      Toenail Condition: Left toenails are abnormally thick.      Comments: Superficial erythema, and  tenderness with  some degree of bruising blister between 1st and second toes.   At base of great toe is a ~5cm deep wound that basically progresses to the skin on the dorsal aspect.   Tophus still present.   Skin:     General: Skin is warm.      Coloration: Skin is not jaundiced.      Findings: Erythema and rash present.   Neurological:      General: No focal deficit present.      Mental Status: He is alert and oriented to person, place, and time. Mental status is at baseline.   Psychiatric:         Mood and Affect: Mood normal.         Behavior: Behavior normal.         Thought Content: Thought content normal.         Judgment: Judgment normal.       Fluids    Intake/Output Summary  (Last 24 hours) at 1/8/2023 1916  Last data filed at 1/8/2023 1537  Gross per 24 hour   Intake 1030 ml   Output 3180 ml   Net -2150 ml       Laboratory  Recent Labs     01/06/23 0415 01/07/23  0630 01/08/23  0927   WBC 14.1* 9.7 9.0   RBC 4.33* 4.41* 4.65*   HEMOGLOBIN 12.2* 12.2* 12.8*   HEMATOCRIT 36.9* 37.5* 39.8*   MCV 85.2 85.0 85.6   MCH 28.2 27.7 27.5   MCHC 33.1* 32.5* 32.2*   RDW 42.6 42.7 42.5   PLATELETCT 209 228 285   MPV 10.0 9.6 9.8     Recent Labs     01/06/23 0415 01/07/23 0630 01/08/23  0927   SODIUM 131* 133* 133*   POTASSIUM 3.6 3.8 3.6   CHLORIDE 98 100 99   CO2 24 26 26   GLUCOSE 208* 168* 130*   BUN 6* 7* 6*   CREATININE 0.59 0.48* 0.54   CALCIUM 8.1* 8.1* 8.4*                   Imaging  US-ONUR SINGLE LEVEL BILAT   Final Result           Assessment/Plan  Problem Representation:    * Sepsis due to methicillin resistant Staphylococcus aureus (MRSA) without acute organ dysfunction (HCC)- (present on admission)  Assessment & Plan  Criteria: febrile 101.3, WBC=18.9, tachycardia 101, Clear source of Left diabetic foot ulcer,  Present on admission at [facility].     -Afebrile 1/7/23. Leukocytosis finally (1/7/23) trending down 9.7. Source control underway  -Antibiotics: Unasyn, Linezolid (MRSA positive cultures). Continuing as likely a polymicrobial wound, technically without source control and with potential future procedures.     Diabetic ulcer of left midfoot associated with type 2 diabetes mellitus (HCC)  Assessment & Plan  - MRI negative for osteomyelitis. ONUR shows normal arterial flow.   - Wound care consulted, following.  - wound culture positive for MRSA GBS and atopobium parvum  - leukocytosis improving, blood cultures NGTD  - I&D 1/05/23, successful.,  Ortho recommends TMA, however patient declines  - ID consulted, recommended IV vancomycin for 2-3 days followed by suppressive therapy augmentin and linezolid for 2-3 months.   - Continue to monitor for signs of necrotizing  fasciitis  -Patient declines amputation and plans to go for second opinion outpatient in [place] .         Hypoalbuminemia- (present on admission)  Assessment & Plan  Mild.  Likely due to inflammatory process.  Continue to monitor    Elevated alkaline phosphatase level- (present on admission)  Assessment & Plan  Likely reactive in the setting of active infection    Hypokalemia- (present on admission)  Assessment & Plan  Replace for goal greater than 4.    Hyponatremia- (present on admission)  Assessment & Plan  Na 130 on presentation, improved with NS infusion  -Resolved      Type 2 diabetes mellitus with circulatory disorder, without long-term current use of insulin (HCC)- (present on admission)  Assessment & Plan  Not on home med, pt states ran out of metformin. Last A1C 9.3% on 1/2/22    - insulin glargine 15 U   - sliding scale insulin   - hypoglycemia protocol  - diabetic education          VTE prophylaxis: SCDs/TEDs and pharmacologic prophylaxis contraindicated due to Procedure    I have performed a physical exam and reviewed and updated ROS and Plan today (1/8/2023). In review of yesterday's note (1/7/2023), there are no changes except as documented above.      Discharge pending I&D, Infection resolution.

## 2023-01-09 NOTE — DISCHARGE SUMMARY
[facility] Internal Medicine Discharge Summary    Attending: Skinny Diggs M.d.  Senior Resident: Dr. Benavidez  Intern:  Dr. Lei  Contact Number: [phone]    CHIEF COMPLAINT ON ADMISSION  Sepsis secondary to left diabetic foot ulcer    Reason for Admission  Diabetic foot wound     Admission Date  1/4/2023    CODE STATUS  Full Code    HPI & HOSPITAL COURSE  This is a 67 y.o. male here with PMH significant for uncontrolled type 2 diabetes (per patient, ran out of metformin) who presented on 1/4/2023 as a transfer from [facility] for a left diabetic foot ulcer requiring LPS service.  She stated that the ulcer presented 4 months prior.  Wound cultures grew MRSA GBS and atopobium parvum. Patient was started on unasyn and linezolid with improvement of ulcer.  MRI was negative for osteomyelitis.  ONUR within normal limits.  He underwent I&D with orthopedic surgery on 1/5/23 but likely will have to return to OR for further I&D and/or TMA which patient refused.  He stated that he would like to obtain a second opinion from orthopedics when he moved back to [place].  He was treated with 6 days of Unasyn, 4 days of vancomycin and 3 days of linezolid.    On 1/8, per ID recommendations unasyn was changed to augmentin and linezolid was changed to IV vancomycin for one additional day of IV MRSA coverage. On 1/9, patient's inpatient antibiotic regimen was transitioned to outpatient antibiotic regimen of doxycycline and augmentin for 3 months suppressive therapy. He was also advised to keep his blood glucose under control and discharged with insulin glargine (lantus) 15U at night (which he was on inpatient and blood glucose as well controlled in the 120s to 160s range) as well as diabetic supplies.  He was seen by diabetes educator and stated that he felt comfortable administering insulin.    Patient was also prescribed 5 days of oxycodone 5mg per request as his daughter and he are completing a 5 day road trip from [place] to  [place] (patient's original place of residence).     Patient was counseled on risk of complications of his cellulitis and foot ulcer including osteomyelitis and necrotizing fasciitis.  He was told to stop at the nearest emergency department immediately with any signs of progression of the infection, severe pain, fever and chills.  He was told to follow-up with PCP as soon as possible to obtain an urgent referral to wound care and orthopedics.  He was discharged with diabetic supplies in addition to wound dressing changes.    Therefore, he is discharged in fair and stable condition to home with close outpatient follow-up.    The patient met 2-midnight criteria for an inpatient stay at the time of discharge.    Discharge Date  1/9/2023    Physical Exam on Day of Discharge  Physical Exam  Vitals and nursing note reviewed.   Constitutional:       General: He is not in acute distress.     Appearance: Normal appearance. He is obese.   HENT:      Head: Normocephalic and atraumatic.      Right Ear: External ear normal.      Left Ear: External ear normal.   Eyes:      General: No scleral icterus.     Conjunctiva/sclera: Conjunctivae normal.   Cardiovascular:      Rate and Rhythm: Normal rate and regular rhythm.      Heart sounds: Normal heart sounds. No murmur heard.  Pulmonary:      Effort: Pulmonary effort is normal. No respiratory distress.   Abdominal:      General: Abdomen is flat. There is no distension.      Tenderness: There is no abdominal tenderness.   Musculoskeletal:        Feet:    Feet:      Right foot:      Toenail Condition: Right toenails are abnormally thick.      Left foot:      Skin integrity: Ulcer and erythema present. No skin breakdown.      Toenail Condition: Left toenails are abnormally thick.      Comments: Superficial erythema, and  tenderness with  some degree of bruising blister between 1st and second toes.   At base of great toe is a ~5cm deep wound that basically progresses to the skin on the  dorsal aspect.   Tophus still present.   Skin:     General: Skin is warm.      Coloration: Skin is not jaundiced.      Findings: Erythema and rash present.   Neurological:      General: No focal deficit present.      Mental Status: He is alert and oriented to person, place, and time. Mental status is at baseline.   Psychiatric:         Mood and Affect: Mood normal.         Behavior: Behavior normal.         Thought Content: Thought content normal.         Judgment: Judgment normal.       FOLLOW UP ITEMS POST DISCHARGE  - PCP  - Wound care  - Ortho    DISCHARGE DIAGNOSES  Principal Problem:    Sepsis due to methicillin resistant Staphylococcus aureus (MRSA) without acute organ dysfunction (HCC) POA: Yes  Active Problems:    Type 2 diabetes mellitus with circulatory disorder, without long-term current use of insulin (HCC) POA: Yes    Hyponatremia POA: Yes    Hypokalemia POA: Yes    Elevated alkaline phosphatase level POA: Yes    Hypoalbuminemia POA: Yes    Diabetic ulcer of left midfoot associated with type 2 diabetes mellitus (HCC) POA: Unknown  Resolved Problems:    Left diabetic foot ulcer and infection POA: Yes    MRSA cellulitis of left foot POA: Yes      FOLLOW UP  No future appointments.  Channing Garcia M.D.  [facility] [address] [id number] [phone]    Schedule an appointment as soon as possible for a visit  Call and make appt with primary care provider as soon as possible.    Call PCP for referral to wound care and orthopedics as soon as possible.      MEDICATIONS ON DISCHARGE     Medication List        START taking these medications        Instructions   Alcohol Prep 70 % Pads   Doctor's comments: Per formulary preference. ICD-10 code: E11.65 Uncontrolled type 2 Diabetes Mellitus  Wipe site with prep pad prior to injection.     amoxicillin-clavulanate 875-125 MG Tabs  Commonly known as: AUGMENTIN   Take 1 Tablet by mouth 2 times a day for 90 days.  Dose: 1 Tablet     doxycycline 100  MG capsule  Commonly known as: MONODOX   Take 1 Capsule by mouth 2 times a day for 90 days.  Dose: 100 mg     Insulin Syringes U-100 0.3 mL   Doctor's comments: Per formulary preference. ICD-10 code: E11.65 Uncontrolled type 2 Diabetes Mellitus  Use one syringe to inject insulin once daily.     Lantus SoloStar 100 UNIT/ML Sopn injection  Generic drug: insulin glargine   Inject 15 Units under the skin every evening.  Dose: 15 Units     oxyCODONE immediate-release 5 MG Tabs  Commonly known as: ROXICODONE   Take 1 Tablet by mouth every 8 hours as needed for Severe Pain for up to 10 doses.  Dose: 5 mg     TechLite Lancets Misc   Doctor's comments: Or per formulary preference. ICD-10 code: E11.65 Uncontrolled type 2 Diabetes Mellitus  Use one Abbott Ellsworth Lite lancet to test blood sugar once daily early morning before first meal.     True Metrix Blood Glucose Test strip  Generic drug: glucose blood   Doctor's comments: Or per formulary preference. ICD-10 code: E11.65 Uncontrolled type 2 Diabetes Mellitus  Use one Abbott Freedom Lite strip to test blood sugar once daily early morning before first meal  (Use one Abbott Freedom Lite strip to test blood sugar once daily early morning before first meal.)     True Metrix Meter w/Device Kit   Doctor's comments: Or per formulary preference. ICD-10 code: E11.65 Uncontrolled type 2 Diabetes Mellitus  Test blood sugar as recommended by provider. [employer]obbins Freedom Lite blood glucose monitoring kit.     TRUEplus 5-Bevel Pen Needles  Generic drug: Insulin Pen Needle 32 G x 4 mm   Doctor's comments: Per patient/formulary preference. ICD-10 code: E11.65 Uncontrolled type 2 Diabetes Mellitus  Use one pen needle in pen device to inject insulin once daily.              Allergies  No Known Allergies    DIET  Orders Placed This Encounter   Procedures    Diet Order Diet: Consistent CHO (Diabetic)     Standing Status:   Standing     Number of Occurrences:   1     Order Specific Question:    Diet:     Answer:   Consistent CHO (Diabetic) [4]       ACTIVITY  As tolerated.  Non-weight bearing LEFT leg    CONSULTATIONS  Orthopedic surgery  Infectious disease    PROCEDURES    1/5/2022: I&D with orthopedic surgery    LABORATORY  Lab Results   Component Value Date    SODIUM 134 (L) 01/09/2023    POTASSIUM 4.0 01/09/2023    CHLORIDE 101 01/09/2023    CO2 23 01/09/2023    GLUCOSE 126 (H) 01/09/2023    BUN 6 (L) 01/09/2023    CREATININE 0.50 01/09/2023        Lab Results   Component Value Date    WBC 7.9 01/09/2023    HEMOGLOBIN 13.7 (L) 01/09/2023    HEMATOCRIT 41.4 (L) 01/09/2023    PLATELETCT 284 01/09/2023        Total time of the discharge process exceeds 41 minutes.

## 2023-01-09 NOTE — PROGRESS NOTES
Crutches have been delivered to pt's room for pt's staff to set up and size for pt to use when ready.   Patient aware of results and recommendations. See result note.

## 2023-01-09 NOTE — ASSESSMENT & PLAN NOTE
- MRI negative for osteomyelitis. ONUR shows normal arterial flow.   - Wound care consulted, following.  - wound culture positive for MRSA GBS and atopobium parvum  - leukocytosis improving, blood cultures NGTD  - I&D 1/05/23, successful.,  Ortho recommends TMA, however patient declines  - ID consulted, recommended IV vancomycin for 2-3 days followed by suppressive therapy augmentin and linezolid for 2-3 months.   - Continue to monitor for signs of necrotizing fasciitis  -Patient declines amputation and plans to go for second opinion outpatient in [place] .        Ab Pelayo(Attending)

## 2023-01-09 NOTE — DISCHARGE PLANNING
Case Management Discharge Planning    Admission Date: 1/4/2023  GMLOS: 5.1  ALOS: 5    6-Clicks ADL Score: 21  6-Clicks Mobility Score: 20      Anticipated Discharge Dispo: Discharge Disposition: Discharged to home/self care (01)    DME Needed: Yes    DME Ordered: Yes    Action(s) Taken: Updated Provider/Nurse on Discharge Plan    Escalations Completed: DME Company    Medically Clear: Yes    Next Steps:     Barriers to Discharge: None    Is the patient up for discharge tomorrow: No TODAY    Noted DC today order.   Visited w/ pt who has new boxed orthopedic shoe @ bedside.   Lft foot surgical dressing w/ ace in place @ this time.  Addressed w/ pt possibility of home health to in light of mobility issues for wound f/u. Pt declines offer from  to facilitate a HH referral this morning.  He does not own crutches and will need before DC: [facility] LLE- [facility] to [facility] [employer] [facility] order pending.  DC plan: Ride from Bro in law a possibility VS waiting for DTR from [place]  around midnight. CM submits to pt to patient to provide Bro in [place] w/ his discharge info regarding late this afternoon /early evening after final IV dose of ABX. Pt advised to track Bld sugars and keep all follow up visits.   Needs: Crutches  order and picked up from [employer] under [employer]. Ride home VS hotel from [employer] after afternoon ABX. Declined HH, ammendable to wound care follow up outpatient but wishes to move to [facility] (ASAP per pt report).  1200 Rec'd word from bedside RN that home equipment for mobility is being recommended by provider and pt has ride w/ family possible will go to a motel likely until ready for home until going to [facility] w/ dtr is feasible.  DC plan: home w/ family providing ride-

## 2023-01-09 NOTE — PROGRESS NOTES
Attention order for walker. Walker has been left on T-3 by DME closet door for pt's staff to  and deliver to pt when ready in return of not needing crutches. Nursing staff stated they will return unused crutches still in package back to [employer]  DME area for return.

## 2023-01-10 ENCOUNTER — PATIENT OUTREACH (OUTPATIENT)
Dept: MEDICAL GROUP | Facility: PHYSICIAN GROUP | Age: 68
End: 2023-01-10
Payer: MEDICARE

## 2023-01-11 LAB
BACTERIA BLD CULT: NORMAL
BACTERIA BLD CULT: NORMAL
SIGNIFICANT IND 70042: NORMAL
SIGNIFICANT IND 70042: NORMAL
SITE SITE: NORMAL
SITE SITE: NORMAL
SOURCE SOURCE: NORMAL
SOURCE SOURCE: NORMAL

## 2023-01-31 LAB
FUNGUS SPEC CULT: NORMAL
FUNGUS SPEC FUNGUS STN: NORMAL
SIGNIFICANT IND 70042: NORMAL
SITE SITE: NORMAL
SOURCE SOURCE: NORMAL

## 2023-06-30 ENCOUNTER — TELEPHONE (OUTPATIENT)
Dept: HEALTH INFORMATION MANAGEMENT | Facility: OTHER | Age: 68
End: 2023-06-30

## 2023-12-06 PROBLEM — S62.616B: Status: ACTIVE | Noted: 2023-12-06

## 2023-12-06 PROBLEM — S66.327A: Status: ACTIVE | Noted: 2023-12-06

## 2023-12-07 ENCOUNTER — HOSPITAL ENCOUNTER (OUTPATIENT)
Facility: MEDICAL CENTER | Age: 68
End: 2023-12-07
Attending: ORTHOPAEDIC SURGERY | Admitting: ORTHOPAEDIC SURGERY
Payer: MEDICARE

## 2023-12-07 ENCOUNTER — APPOINTMENT (OUTPATIENT)
Dept: RADIOLOGY | Facility: MEDICAL CENTER | Age: 68
End: 2023-12-07
Attending: ORTHOPAEDIC SURGERY
Payer: MEDICARE

## 2023-12-07 ENCOUNTER — ANESTHESIA (OUTPATIENT)
Dept: SURGERY | Facility: MEDICAL CENTER | Age: 68
End: 2023-12-07
Payer: MEDICARE

## 2023-12-07 ENCOUNTER — ANESTHESIA EVENT (OUTPATIENT)
Dept: SURGERY | Facility: MEDICAL CENTER | Age: 68
End: 2023-12-07
Payer: MEDICARE

## 2023-12-07 VITALS
SYSTOLIC BLOOD PRESSURE: 128 MMHG | DIASTOLIC BLOOD PRESSURE: 80 MMHG | BODY MASS INDEX: 32.54 KG/M2 | TEMPERATURE: 97.5 F | OXYGEN SATURATION: 93 % | HEART RATE: 78 BPM | HEIGHT: 74 IN | WEIGHT: 253.53 LBS | RESPIRATION RATE: 12 BRPM

## 2023-12-07 LAB
ANION GAP SERPL CALC-SCNC: 9 MMOL/L (ref 7–16)
BUN SERPL-MCNC: 20 MG/DL (ref 8–22)
CALCIUM SERPL-MCNC: 8.9 MG/DL (ref 8.5–10.5)
CHLORIDE SERPL-SCNC: 103 MMOL/L (ref 96–112)
CO2 SERPL-SCNC: 25 MMOL/L (ref 20–33)
CREAT SERPL-MCNC: 0.68 MG/DL (ref 0.5–1.4)
GFR SERPLBLD CREATININE-BSD FMLA CKD-EPI: 102 ML/MIN/1.73 M 2
GLUCOSE BLD STRIP.AUTO-MCNC: 186 MG/DL (ref 65–99)
GLUCOSE SERPL-MCNC: 204 MG/DL (ref 65–99)
POTASSIUM SERPL-SCNC: 4.3 MMOL/L (ref 3.6–5.5)
SODIUM SERPL-SCNC: 137 MMOL/L (ref 135–145)

## 2023-12-07 PROCEDURE — 160025 RECOVERY II MINUTES (STATS): Performed by: ORTHOPAEDIC SURGERY

## 2023-12-07 PROCEDURE — 700102 HCHG RX REV CODE 250 W/ 637 OVERRIDE(OP): Performed by: ANESTHESIOLOGY

## 2023-12-07 PROCEDURE — 700111 HCHG RX REV CODE 636 W/ 250 OVERRIDE (IP): Performed by: ANESTHESIOLOGY

## 2023-12-07 PROCEDURE — 160048 HCHG OR STATISTICAL LEVEL 1-5: Performed by: ORTHOPAEDIC SURGERY

## 2023-12-07 PROCEDURE — 160028 HCHG SURGERY MINUTES - 1ST 30 MINS LEVEL 3: Performed by: ORTHOPAEDIC SURGERY

## 2023-12-07 PROCEDURE — 36415 COLL VENOUS BLD VENIPUNCTURE: CPT

## 2023-12-07 PROCEDURE — 502240 HCHG MISC OR SUPPLY RC 0272: Performed by: ORTHOPAEDIC SURGERY

## 2023-12-07 PROCEDURE — 26746 TREAT FINGER FRACTURE EACH: CPT | Mod: F9 | Performed by: ORTHOPAEDIC SURGERY

## 2023-12-07 PROCEDURE — 82962 GLUCOSE BLOOD TEST: CPT

## 2023-12-07 PROCEDURE — 160046 HCHG PACU - 1ST 60 MINS PHASE II: Performed by: ORTHOPAEDIC SURGERY

## 2023-12-07 PROCEDURE — A9270 NON-COVERED ITEM OR SERVICE: HCPCS | Performed by: ANESTHESIOLOGY

## 2023-12-07 PROCEDURE — 73140 X-RAY EXAM OF FINGER(S): CPT | Mod: RT

## 2023-12-07 PROCEDURE — 160035 HCHG PACU - 1ST 60 MINS PHASE I: Performed by: ORTHOPAEDIC SURGERY

## 2023-12-07 PROCEDURE — C1713 ANCHOR/SCREW BN/BN,TIS/BN: HCPCS | Performed by: ORTHOPAEDIC SURGERY

## 2023-12-07 PROCEDURE — 80048 BASIC METABOLIC PNL TOTAL CA: CPT

## 2023-12-07 PROCEDURE — 700101 HCHG RX REV CODE 250: Performed by: ORTHOPAEDIC SURGERY

## 2023-12-07 PROCEDURE — 26418 REPAIR FINGER TENDON: CPT | Performed by: ORTHOPAEDIC SURGERY

## 2023-12-07 PROCEDURE — 11012 DEB SKIN BONE AT FX SITE: CPT | Performed by: ORTHOPAEDIC SURGERY

## 2023-12-07 PROCEDURE — 160039 HCHG SURGERY MINUTES - EA ADDL 1 MIN LEVEL 3: Performed by: ORTHOPAEDIC SURGERY

## 2023-12-07 PROCEDURE — 502000 HCHG MISC OR IMPLANTS RC 0278: Performed by: ORTHOPAEDIC SURGERY

## 2023-12-07 PROCEDURE — 700101 HCHG RX REV CODE 250: Performed by: ANESTHESIOLOGY

## 2023-12-07 PROCEDURE — 160002 HCHG RECOVERY MINUTES (STAT): Performed by: ORTHOPAEDIC SURGERY

## 2023-12-07 PROCEDURE — 700105 HCHG RX REV CODE 258: Performed by: ORTHOPAEDIC SURGERY

## 2023-12-07 PROCEDURE — 160009 HCHG ANES TIME/MIN: Performed by: ORTHOPAEDIC SURGERY

## 2023-12-07 DEVICE — IMPLANTABLE DEVICE: Type: IMPLANTABLE DEVICE | Site: FINGER | Status: FUNCTIONAL

## 2023-12-07 RX ORDER — CEFAZOLIN SODIUM 1 G/3ML
INJECTION, POWDER, FOR SOLUTION INTRAMUSCULAR; INTRAVENOUS PRN
Status: DISCONTINUED | OUTPATIENT
Start: 2023-12-07 | End: 2023-12-07 | Stop reason: SURG

## 2023-12-07 RX ORDER — OXYCODONE HCL 5 MG/5 ML
5 SOLUTION, ORAL ORAL
Status: DISCONTINUED | OUTPATIENT
Start: 2023-12-07 | End: 2023-12-07 | Stop reason: HOSPADM

## 2023-12-07 RX ORDER — HYDROMORPHONE HYDROCHLORIDE 1 MG/ML
0.1 INJECTION, SOLUTION INTRAMUSCULAR; INTRAVENOUS; SUBCUTANEOUS
Status: DISCONTINUED | OUTPATIENT
Start: 2023-12-07 | End: 2023-12-07 | Stop reason: HOSPADM

## 2023-12-07 RX ORDER — HYDRALAZINE HYDROCHLORIDE 20 MG/ML
5 INJECTION INTRAMUSCULAR; INTRAVENOUS
Status: DISCONTINUED | OUTPATIENT
Start: 2023-12-07 | End: 2023-12-07 | Stop reason: HOSPADM

## 2023-12-07 RX ORDER — CEFAZOLIN SODIUM 1 G/3ML
2 INJECTION, POWDER, FOR SOLUTION INTRAMUSCULAR; INTRAVENOUS ONCE
Status: DISCONTINUED | OUTPATIENT
Start: 2023-12-07 | End: 2023-12-07 | Stop reason: HOSPADM

## 2023-12-07 RX ORDER — EPHEDRINE SULFATE 50 MG/ML
INJECTION, SOLUTION INTRAVENOUS PRN
Status: DISCONTINUED | OUTPATIENT
Start: 2023-12-07 | End: 2023-12-07 | Stop reason: SURG

## 2023-12-07 RX ORDER — EPHEDRINE SULFATE 50 MG/ML
10 INJECTION, SOLUTION INTRAVENOUS
Status: DISCONTINUED | OUTPATIENT
Start: 2023-12-07 | End: 2023-12-07 | Stop reason: HOSPADM

## 2023-12-07 RX ORDER — LIDOCAINE HYDROCHLORIDE 20 MG/ML
INJECTION, SOLUTION EPIDURAL; INFILTRATION; INTRACAUDAL; PERINEURAL PRN
Status: DISCONTINUED | OUTPATIENT
Start: 2023-12-07 | End: 2023-12-07 | Stop reason: SURG

## 2023-12-07 RX ORDER — MEPERIDINE HYDROCHLORIDE 25 MG/ML
12.5 INJECTION INTRAMUSCULAR; INTRAVENOUS; SUBCUTANEOUS
Status: DISCONTINUED | OUTPATIENT
Start: 2023-12-07 | End: 2023-12-07 | Stop reason: HOSPADM

## 2023-12-07 RX ORDER — SODIUM CHLORIDE, SODIUM LACTATE, POTASSIUM CHLORIDE, CALCIUM CHLORIDE 600; 310; 30; 20 MG/100ML; MG/100ML; MG/100ML; MG/100ML
INJECTION, SOLUTION INTRAVENOUS CONTINUOUS
Status: DISCONTINUED | OUTPATIENT
Start: 2023-12-07 | End: 2023-12-07 | Stop reason: HOSPADM

## 2023-12-07 RX ORDER — HYDROMORPHONE HYDROCHLORIDE 1 MG/ML
0.4 INJECTION, SOLUTION INTRAMUSCULAR; INTRAVENOUS; SUBCUTANEOUS
Status: DISCONTINUED | OUTPATIENT
Start: 2023-12-07 | End: 2023-12-07 | Stop reason: HOSPADM

## 2023-12-07 RX ORDER — DEXAMETHASONE SODIUM PHOSPHATE 4 MG/ML
INJECTION, SOLUTION INTRA-ARTICULAR; INTRALESIONAL; INTRAMUSCULAR; INTRAVENOUS; SOFT TISSUE PRN
Status: DISCONTINUED | OUTPATIENT
Start: 2023-12-07 | End: 2023-12-07 | Stop reason: SURG

## 2023-12-07 RX ORDER — AMOXICILLIN AND CLAVULANATE POTASSIUM 875; 125 MG/1; MG/1
1 TABLET, FILM COATED ORAL 2 TIMES DAILY
Qty: 28 TABLET | Refills: 0 | Status: SHIPPED | OUTPATIENT
Start: 2023-12-07

## 2023-12-07 RX ORDER — LIDOCAINE HYDROCHLORIDE 10 MG/ML
INJECTION, SOLUTION INFILTRATION; PERINEURAL
Status: DISCONTINUED | OUTPATIENT
Start: 2023-12-07 | End: 2023-12-07 | Stop reason: HOSPADM

## 2023-12-07 RX ORDER — HALOPERIDOL 5 MG/ML
1 INJECTION INTRAMUSCULAR
Status: DISCONTINUED | OUTPATIENT
Start: 2023-12-07 | End: 2023-12-07 | Stop reason: HOSPADM

## 2023-12-07 RX ORDER — ONDANSETRON 2 MG/ML
INJECTION INTRAMUSCULAR; INTRAVENOUS PRN
Status: DISCONTINUED | OUTPATIENT
Start: 2023-12-07 | End: 2023-12-07 | Stop reason: SURG

## 2023-12-07 RX ORDER — ACETAMINOPHEN 500 MG
1000 TABLET ORAL EVERY 6 HOURS PRN
Status: DISCONTINUED | OUTPATIENT
Start: 2023-12-07 | End: 2023-12-07 | Stop reason: HOSPADM

## 2023-12-07 RX ORDER — OXYCODONE HCL 5 MG/5 ML
10 SOLUTION, ORAL ORAL
Status: DISCONTINUED | OUTPATIENT
Start: 2023-12-07 | End: 2023-12-07 | Stop reason: HOSPADM

## 2023-12-07 RX ORDER — BUPIVACAINE HYDROCHLORIDE 5 MG/ML
INJECTION, SOLUTION EPIDURAL; INTRACAUDAL
Status: DISCONTINUED | OUTPATIENT
Start: 2023-12-07 | End: 2023-12-07 | Stop reason: HOSPADM

## 2023-12-07 RX ORDER — DIPHENHYDRAMINE HYDROCHLORIDE 50 MG/ML
6.25 INJECTION INTRAMUSCULAR; INTRAVENOUS
Status: DISCONTINUED | OUTPATIENT
Start: 2023-12-07 | End: 2023-12-07 | Stop reason: HOSPADM

## 2023-12-07 RX ORDER — ONDANSETRON 2 MG/ML
4 INJECTION INTRAMUSCULAR; INTRAVENOUS
Status: DISCONTINUED | OUTPATIENT
Start: 2023-12-07 | End: 2023-12-07 | Stop reason: HOSPADM

## 2023-12-07 RX ORDER — HYDROMORPHONE HYDROCHLORIDE 1 MG/ML
0.2 INJECTION, SOLUTION INTRAMUSCULAR; INTRAVENOUS; SUBCUTANEOUS
Status: DISCONTINUED | OUTPATIENT
Start: 2023-12-07 | End: 2023-12-07 | Stop reason: HOSPADM

## 2023-12-07 RX ADMIN — SODIUM CHLORIDE, POTASSIUM CHLORIDE, SODIUM LACTATE AND CALCIUM CHLORIDE: 600; 310; 30; 20 INJECTION, SOLUTION INTRAVENOUS at 13:08

## 2023-12-07 RX ADMIN — ACETAMINOPHEN 1000 MG: 500 TABLET ORAL at 14:42

## 2023-12-07 RX ADMIN — CEFAZOLIN 2 G: 1 INJECTION, POWDER, FOR SOLUTION INTRAMUSCULAR; INTRAVENOUS at 13:11

## 2023-12-07 RX ADMIN — LIDOCAINE HYDROCHLORIDE 60 MG: 20 INJECTION, SOLUTION EPIDURAL; INFILTRATION; INTRACAUDAL at 13:11

## 2023-12-07 RX ADMIN — DEXAMETHASONE SODIUM PHOSPHATE 8 MG: 4 INJECTION INTRA-ARTICULAR; INTRALESIONAL; INTRAMUSCULAR; INTRAVENOUS; SOFT TISSUE at 13:14

## 2023-12-07 RX ADMIN — FENTANYL CITRATE 100 MCG: 50 INJECTION, SOLUTION INTRAMUSCULAR; INTRAVENOUS at 13:11

## 2023-12-07 RX ADMIN — ONDANSETRON 4 MG: 2 INJECTION INTRAMUSCULAR; INTRAVENOUS at 13:37

## 2023-12-07 RX ADMIN — PROPOFOL 200 MG: 10 INJECTION, EMULSION INTRAVENOUS at 13:11

## 2023-12-07 RX ADMIN — EPHEDRINE SULFATE 10 MG: 50 INJECTION, SOLUTION INTRAVENOUS at 13:13

## 2023-12-07 ASSESSMENT — PAIN DESCRIPTION - PAIN TYPE
TYPE: ACUTE PAIN;SURGICAL PAIN
TYPE: ACUTE PAIN
TYPE: ACUTE PAIN;SURGICAL PAIN
TYPE: SURGICAL PAIN
TYPE: ACUTE PAIN;SURGICAL PAIN
TYPE: ACUTE PAIN;SURGICAL PAIN

## 2023-12-07 ASSESSMENT — PAIN SCALES - GENERAL: PAIN_LEVEL: 3

## 2023-12-07 ASSESSMENT — FIBROSIS 4 INDEX: FIB4 SCORE: 1.04

## 2023-12-07 NOTE — ANESTHESIA PREPROCEDURE EVALUATION
Case: [id number] Date/Time: 12/07/23 1345    Procedures:       RIGHT SMALL FINGER OPEN REDUCTION INTERNAL FIXATION, OPEN FRACTURE DEBRIDEMENT, EXTENSOR TENDON REPAIR      IRRIGATION AND DEBRIDEMENT, WOUND (Right)      REPAIR, TENDON, EXTENSOR    Diagnosis:       Laceration of extensor muscle, fascia and tendon of left little finger at wrist and hand level, initial encounter [S66.327A]      Displaced fracture of proximal phalanx of right little finger, initial encounter for open fracture [S62.636B]    Pre-op diagnosis: Laceration of extensor muscle, fascia and tendon of left little finger at wrist and hand level, initial encounter [S66.327A]Displaced fracture of proximal phalanx of right little finger, initial encounter for open fracture [S62.556B]    Location: [facility] ROOM 21 / SURGERY SAME DAY [facility]    Surgeons: Anabelle Dallas M.D.            Relevant Problems   ENDO   (positive) Type 2 diabetes mellitus with circulatory disorder, without long-term current use of insulin (HCC)   (positive) Type 2 diabetes mellitus with diabetic polyneuropathy (HCC)      Other   (positive) Displaced fracture of proximal phalanx of right little finger, initial encounter for open fracture   (positive) Elevated blood pressure reading in office without diagnosis of hypertension   (positive) Laceration of extensor muscle, fascia and tendon of left little finger at wrist and hand level, initial encounter       Physical Exam    Airway   Mallampati: II  TM distance: >3 FB  Neck ROM: full       Cardiovascular - normal exam  Rhythm: regular  Rate: normal  (-) murmur     Dental - normal exam           Pulmonary - normal exam  Breath sounds clear to auscultation     Abdominal    Neurological - normal exam                   Anesthesia Plan    ASA 2       Plan - general       Airway plan will be LMA          Induction: intravenous    Postoperative Plan: Postoperative administration of opioids is intended.    Pertinent diagnostic labs and testing  reviewed    Informed Consent:    Anesthetic plan and risks discussed with patient.

## 2023-12-07 NOTE — LETTER
December 6, 2023    Patient Name: Jimmy Amaya  Surgeon Name: Anabelle Dallas M.D.  Surgery Facility: [facility] ([address] [place] [address])  Surgery Date: 12/7/2023    The time of your surgery is not final and may change up to and until the day of your surgery. You will be contacted 24-48 hours prior to your surgery date with your check-in and surgery time.    If you will not be at one of the below numbers please call the surgery scheduler at [phone]  Preferred Phone: [phone]    BEFORE YOUR SURGERY   Pre Registration and/or Lab Work must be done within and no earlier than 28 days prior to your surgery date. Your scheduled facility will contact you regarding all required preregistration and/or lab work. If you have not been contacted within 7 days of your scheduled procedure please call [facility] at [phone] for an appointment as soon as possible.    Instructions: Bring a list of all medications you are taking including the dosing and frequency.    DAY OF YOUR SURGERY  Nothing to eat or drink after midnight     Refrain from smoking any substance after midnight prior to surgery. Smoking may interfere with the anesthetic and frequently produces nausea during the recovery period.    Continue taking all lifesaving medications. Including the morning of your surgery with small sip of water.        Please do NOT take on the day of surgery:  Diuretics: examples- furosemide (Lasix), spironolactone, hydrochlorothiazide  ACE-inhibitors: examples- lisinopril, ramipril, enalapril  “ARBs”: examples- losartan, Olmesartan, valsartan    Please arrive at the hospital/surgery center at the check-in time provided.     An adult will need to bring you and take you home after your surgery.     AFTER YOUR SURGERY  Post op Appointment:   Date: 12/18   Time: 930am   With: Wilder Dallas MD   Location: [address] [place] [id number]    - Post Surgery - You will need someone to drive you home      TIME OFF WORK  FMLA or Disability forms can be faxed directly to: [phone] or you may drop them off at [address] [place] [address]. Our office charges a $35.00 fee per form. Forms will be completed within 10 business days of drop off and payment received. For the status of your forms you may contact our disability office directly [phone].    MEDICATION INSTRUCTIONS **Please read section completely**  The following medications should be stopped a minimum of 10 days prior to surgery:  All over the counter, Supplements & Herbal medications    Anorectics: Phentermine (Adipex-P, Lomaira and Suprenza), Phentermine-topiramate (Qsymia), Bupropion-naltrexone (Contrave)    **If you are on Bupropion for anxiety/depression, please continue this medication up until the day of surgery.     Opiod Partial Agonists/Opioid Antagonists: Buprenorphine (Suboxone, Belbuca, Butrans, Probuphine Implant, Sublocade), Naltrexone (ReVia, Vivitrol), Naloxone    Amphetamines: Dextroamphetamine/Amphetamine (Adderall, Mydayis), Methylphenidate Hydrochloride (Concerta, Metadate, Methylin, Ritalin)    The following medications should be stopped 5 days prior to surgery:  Blood Thinners: Any Aspirin, Aspirin products, anti-inflammatories such as ibuprofen and any blood thinners such as Coumadin and Plavix. Please consult your prescribing physician if you are on life saving blood thinners, in regards to when to stop medications prior to surgery.     The following medications should be stopped a minimum of 3 days prior to surgery:  PDE-5 inhibitors: Sildenafil (Viagra), Tadalafil (Cialis), Vardenafil (Levitra), Avanafil (Stendra)    MAO Inhibitors: Rasagiline (Azilect), Selegiline (Eldepryl, Emsam, Selapar), Isocarboxazid (Marplan), Phenelzine (Nardil)    You can use [employer] to message your Care Team. Don't have a [employer] account? Sign up here:       COVID and INFLUENZA NOTICE TO PATIENTS    Currently, the [facility] does not routinely test patients for COVID-19 or Influenza prior to their elective surgery.  However, if patients develop the following symptoms prior to their surgery date, they should voluntarily test for COVID-19 and Influenza and notify the surgical office of their condition and results.  The symptoms warranting testing would be any two of the following:    Fever (Temp above 100.4 F)  Chills  Cough  Shortness of breath or difficulty breathing  Fatigue  Myalgias (muscle or body aches)  Headache  Sore Throat  Congestion or Runny Nose  Nausea or vomiting  Diarrhea  New loss of taste or smell    Having these symptoms prior to surgery can significantly increase your risk of morbidity and mortality under anesthesia, which may compromise your health and require a transfer to a hospital for a higher level of care.  Therefore, it is advisable to notify the surgical team of any illness in order to get information for the appropriate time to delay the surgery to minimize these preventable risks.    Your health and safety are our number one priority at the [facility], and we are thankful that you entrust us with your care.  Please help us ensure the best possible surgical and anesthetic outcome by sharing appropriate health information with our perioperative team and staff.  We look forward to taking great care of you!    Thank you for your time and consideration on this matter.    Kalin Lind MD  Medical Director  Anesthesiologist  [facility]

## 2023-12-07 NOTE — DISCHARGE INSTR - OTHER INFO
DR. DALLAS'S POST-OPERATIVE INSTRUCTIONS    You have just undergone a surgery by Dr. Dallas in the operating room.  It is our wish that your postoperative recovery be as quick and comfortable as possible.  Please carefully review the following items that are important for your recovery.    After any operation, a certain degree of pain is to be expected. Take Advil (ibuprofen) and Extra Strength Tylenol as first line medications for mild to moderate pain. Taking each one every 6 hours, and staggering them so that you are taking one medicine every 3 hours, is the most effective. Refer to dosing instructions on the bottle, but in general ibuprofen dose is 600-800mg and Tylenol dose is 500-1000mg. For most small procedures, this should be enough to keep you comfortable. Take these medications until your followup visit. You may have been given a small prescription for stronger pain medicine which will help relieve more severe pain.  Pain medicine may make you drowsy so please keep this in mind.  Do not drive while taking pain medicine.      When you go home, please keep your operated arm elevated at all times (above the level of your heart).  If you do this, your swelling will resolve more quickly and your pain will be improve more quickly as well. You may also place an ice pack over your dressing or splint to help with swelling and pain.    It is also very important to keep your fingers moving after most procedures, unless you had a tendon repair or fracture repair in which case you will be in a splint. Keep all of your fingers moving through a full range of motion to prevent stiffness.    For small hand procedures such as carpal tunnel release, trigger finger release, and cyst excision, the dressing that you have on your extremity should remain on for 3-4 days. It may then be removed, you can wash the incision  gently with soap and water, and keep the incision covered with a band-aid or similar clean dressing. For larger procedures or if you have a splint on, these should remain on until follow up or as specifically instructed. If you feel that your dressing is too tight during the first 3 days after surgery, you may loosen it. It is normal to see minor staining on the dressing after surgery. If there is significant bleeding, you are advised to call the office during regular office hours to have this checked.  Make sure that your dressing is kept dry at all times.  You can take a shower if you cover your arm with a plastic bag. If your dressing gets wet, replace it with sterile dressing that you can obtain from your local drug store.    Follow up after surgery is typically 10-14 days, unless you were specifically instructed otherwise. This appointment is made at time of surgical scheduling. The sutures will be removed and you may be asked to see a hand therapist to optimize your functional result. Each of the hand therapists that you will be referred to have received special training in the care of the hand and upper extremity.    If you have questions regarding your surgery postop that you feel requires attention, please call the office at [phone] during business hours, or [phone] after hours for the answering service. If you feel that you have a surgical emergency postoperatively that requires immediate attention, please call the above numbers or go to the Emergency Department and ask for the Orthopedic Surgeon on call.      walks daily

## 2023-12-07 NOTE — ANESTHESIA PROCEDURE NOTES
Airway    Date/Time: 12/7/2023 1:11 PM    Performed by: Christian Bryant M.D.  Authorized by: Christian Bryant M.D.    Location:  OR  Urgency:  Elective  Difficult Airway: No    Indications for Airway Management:  Anesthesia      Spontaneous Ventilation: absent    Sedation Level:  Deep  Preoxygenated: Yes    Mask Difficulty Assessment:  0 - not attempted  Final Airway Type:  Supraglottic airway  Final Supraglottic Airway:  Standard LMA    SGA Size:  5  Number of Attempts at Approach:  1

## 2023-12-07 NOTE — ANESTHESIA TIME REPORT
Anesthesia Start and Stop Event Times       Date Time Event    12/7/2023 1250 Ready for Procedure     1308 Anesthesia Start     1352 Anesthesia Stop          Responsible Staff  12/07/23      Name Role Begin End    Christian Bryant M.D. Anesth 1308 1352          Overtime Reason:  no overtime (within assigned shift)    Comments:

## 2023-12-07 NOTE — ANESTHESIA POSTPROCEDURE EVALUATION
Patient: Jimmy Amaya    Procedure Summary       Date: 12/07/23 Room / Location: [facility] ROOM 21 / SURGERY SAME DAY [facility]    Anesthesia Start: 1308 Anesthesia Stop: 1352    Procedures:       RIGHT SMALL FINGER OPEN REDUCTION INTERNAL FIXATION, OPEN FRACTURE DEBRIDEMENT, EXTENSOR TENDON REPAIR (Right: Finger)      IRRIGATION AND DEBRIDEMENT, WOUND (Right: Finger)      REPAIR, TENDON, EXTENSOR (Right: Finger) Diagnosis:       Laceration of extensor muscle, fascia and tendon of left little finger at wrist and hand level, initial encounter      Displaced fracture of proximal phalanx of right little finger, initial encounter for open fracture      (Laceration of extensor muscle, fascia and tendon of left little finger at wrist and hand level, initial encounter [S66.327A]Displaced fracture of proximal phalanx of right little finger, initial encounter for open fracture)    Surgeons: Anabelle Dallas M.D. Responsible Provider: Christian Bryant M.D.    Anesthesia Type: general ASA Status: 2            Final Anesthesia Type: general  Last vitals  BP   Blood Pressure : 133/77    Temp   36.4 °C (97.6 °F)    Pulse   87   Resp   18    SpO2   97 %      Anesthesia Post Evaluation    Patient location during evaluation: PACU  Patient participation: complete - patient participated  Level of consciousness: awake and alert  Pain score: 3    Airway patency: patent  Anesthetic complications: no  Cardiovascular status: hemodynamically stable  Respiratory status: acceptable  Hydration status: euvolemic    PONV: none          No notable events documented.

## 2023-12-07 NOTE — OP REPORT
OPERATIVE NOTE     DATE OF PROCEDURE: 12/7/2023            PRE-OP DIAGNOSIS: Right small finger proximal phalanx intra-articular open fracture, extensor tendon laceration            POST-OP DIAGNOSIS: same            PROCEDURE: Irrigation debridement of open fracture of the right small finger proximal phalanx, ORIF proximal phalanx, repair extensor tendon on dorsum of the hand/finger            SURGEON: Anabelle Dallas M.D. - Primary            ANESTHESIA: General             ESTIMATED BLOOD LOSS: Minimal                   SPECIMENS: None            COMPLICATIONS: none            CONDITION: stable to PACU            OPERATIVE INDICATIONS AND DESCRIPTION OF PROCEDURE: Jimmy is a pleasant 68-year-old gentleman who presented to my office yesterday with an open small finger fracture and extensor tendon laceration from tablesaw injury.  The fracture was significantly comminuted and displaced.  He is also diabetic.  We had a long discussion about the severity of the injury given the comminution and bone loss as well as extensor tendon injury concurrently.  We discussed difficulties treating these as well as guarded outcomes.  We discussed treatment options.  He refused amputation which I felt would be a good option for him to regain early function and quick recovery.  He wanted to undergo I&D and ORIF with extensor tendon repair.  We discussed surgical technique, risks, benefits, alternatives, and expectations.  We discussed risks including, but not limited to, bleeding, infection, wound issues, hematoma, seroma, osteomyelitis, malunion, nonunion, hardware irritation, hardware failure, need for revision, need for amputation, extensor lag, extensor rerupture, lack of extensor function, stiffness, weakness, minimal finger function, risks of anesthesia.  He elected to proceed.  I saw him in the preoperative holding area, identified him, and marked the right small finger.  He was taken back to the operating room.  General  anesthesia was induced by the anesthesia provider.  Tourniquet was applied to the arm and the upper extremity was prepped and draped in usual orthopedic fashion.  Timeout was performed.  Tourniquet was inflated to 200 mmHg.  I first performed a digital block using a mixture of 1% lidocaine and 0.5 significant.  I then remove the sutures on the dorsum of his finger and open the wound.  I extended this proximally distally in a better fashion.  I identified the extensor apparatus which was 95% lacerated and quite shredded and frayed.  I identified the open fracture which is extremely comminuted.  There were several small metallic fragments in the wound but really no other signs of contamination.  I thoroughly debrided and performed an excisional debridement of the skin, subcutaneous tissues, and bone.  The open fracture site was thoroughly irrigated with normal saline.  I placed vancomycin powder in the fracture site.  I then proceeded with fixation.  I felt that I would be able to obtain adequate fixation given the bone stock proximally distally.  I elected to proceed with intramedullary fixation.  I placed a guidewire for [employer]Med intramedullary screw retrograde to the PIP joint and held the fracture reduced.  I bridged the comminution.  I measured for a 30 mm screw, drilled over the K wire, and then placed the screw while holding appropriate length and rotation of the digit.  I was happy with the fixation and stability as well as hardware placement.  I then proceeded with repair of the extensor tendon.  I placed 3-4 interrupted horizontal mattress sutures across the extensor tendon.  It was a little bit tight but well-approximated.  The tendon ends were little bit frayed and we did trim a little bit of the tissue to get better tendon ends.  At this point the wound was thoroughly irrigated once again, additional vancomycin powder was introduced into the wound.  Skin was closed with 4-0 nylon.  Sterile dressings were  applied.  Ulnar gutter splint with the finger in extension was applied.  Tourniquet was deflated.  He woke from anesthesia and was transferred to the PACU in stable condition.

## 2023-12-07 NOTE — OR NURSING
1351: Pt arrived from OR to PACU 9. Connected to monitor. Report received from anesthesia & RN. VSS. 02 6L via mask. Breaths calm, even, and unlabored. Right arm dressing with acewrap, extremity warm, cap refill <2 sec. OPA in place.     1405 wife updated on patient status     1422 opa dc'd , pt denies nausea and pain     1429 pt tolerating water     1446  Discharge instructions reviewed with family member. All questions answered, verbalizes understanding.     1500 IV and ID bands removed, assisted to change into own clothing.     1503 Escorted to car via wheelchair, accompanied by tech. All personal belongings & discharge instructions with patient.

## 2025-07-07 ENCOUNTER — HOSPITAL ENCOUNTER (INPATIENT)
Facility: MEDICAL CENTER | Age: 70
LOS: 11 days | DRG: 853 | End: 2025-07-18
Attending: INTERNAL MEDICINE | Admitting: STUDENT IN AN ORGANIZED HEALTH CARE EDUCATION/TRAINING PROGRAM
Payer: MEDICARE

## 2025-07-07 ENCOUNTER — HOSPITAL ENCOUNTER (OUTPATIENT)
Dept: RADIOLOGY | Facility: MEDICAL CENTER | Age: 70
End: 2025-07-07
Payer: MEDICARE

## 2025-07-07 DIAGNOSIS — E11.59 TYPE 2 DIABETES MELLITUS WITH OTHER CIRCULATORY COMPLICATION, WITHOUT LONG-TERM CURRENT USE OF INSULIN (HCC): ICD-10-CM

## 2025-07-07 DIAGNOSIS — Z79.899 ON DEEP VEIN THROMBOSIS (DVT) PROPHYLAXIS: ICD-10-CM

## 2025-07-07 DIAGNOSIS — Z89.512 S/P BKA (BELOW KNEE AMPUTATION), LEFT (HCC): ICD-10-CM

## 2025-07-07 DIAGNOSIS — R06.6 HICCUPS: ICD-10-CM

## 2025-07-07 DIAGNOSIS — K59.03 DRUG-INDUCED CONSTIPATION: ICD-10-CM

## 2025-07-07 DIAGNOSIS — L08.9 DIABETIC FOOT INFECTION (HCC): ICD-10-CM

## 2025-07-07 DIAGNOSIS — R78.81 BACTEREMIA: ICD-10-CM

## 2025-07-07 DIAGNOSIS — G47.9 SLEEP DIFFICULTIES: ICD-10-CM

## 2025-07-07 DIAGNOSIS — E11.628 DIABETIC FOOT INFECTION (HCC): ICD-10-CM

## 2025-07-07 DIAGNOSIS — S62.616B DISPLACED FRACTURE OF PROXIMAL PHALANX OF RIGHT LITTLE FINGER, INITIAL ENCOUNTER FOR OPEN FRACTURE: ICD-10-CM

## 2025-07-07 DIAGNOSIS — R41.0 DELIRIUM: Primary | ICD-10-CM

## 2025-07-07 LAB
ALBUMIN SERPL BCP-MCNC: 2.8 G/DL (ref 3.2–4.9)
ALBUMIN/GLOB SERPL: 0.8 G/DL
ALP SERPL-CCNC: 113 U/L (ref 30–99)
ALT SERPL-CCNC: 22 U/L (ref 2–50)
ANION GAP SERPL CALC-SCNC: 14 MMOL/L (ref 7–16)
AST SERPL-CCNC: 28 U/L (ref 12–45)
BASOPHILS # BLD AUTO: 0 % (ref 0–1.8)
BASOPHILS # BLD: 0 K/UL (ref 0–0.12)
BILIRUB SERPL-MCNC: 1.9 MG/DL (ref 0.1–1.5)
BUN SERPL-MCNC: 31 MG/DL (ref 8–22)
CALCIUM ALBUM COR SERPL-MCNC: 9.8 MG/DL (ref 8.5–10.5)
CALCIUM SERPL-MCNC: 8.8 MG/DL (ref 8.5–10.5)
CHLORIDE SERPL-SCNC: 95 MMOL/L (ref 96–112)
CO2 SERPL-SCNC: 23 MMOL/L (ref 20–33)
CREAT SERPL-MCNC: 0.94 MG/DL (ref 0.5–1.4)
EOSINOPHIL # BLD AUTO: 0 K/UL (ref 0–0.51)
EOSINOPHIL NFR BLD: 0 % (ref 0–6.9)
ERYTHROCYTE [DISTWIDTH] IN BLOOD BY AUTOMATED COUNT: 40.5 FL (ref 35.9–50)
GFR SERPLBLD CREATININE-BSD FMLA CKD-EPI: 88 ML/MIN/1.73 M 2
GLOBULIN SER CALC-MCNC: 3.6 G/DL (ref 1.9–3.5)
GLUCOSE BLD STRIP.AUTO-MCNC: 284 MG/DL (ref 65–99)
GLUCOSE SERPL-MCNC: 264 MG/DL (ref 65–99)
HCT VFR BLD AUTO: 39.6 % (ref 42–52)
HGB BLD-MCNC: 13.4 G/DL (ref 14–18)
LYMPHOCYTES # BLD AUTO: 1.38 K/UL (ref 1–4.8)
LYMPHOCYTES NFR BLD: 6.8 % (ref 22–41)
MACROCYTES BLD QL SMEAR: ABNORMAL
MANUAL DIFF BLD: NORMAL
MCH RBC QN AUTO: 28.2 PG (ref 27–33)
MCHC RBC AUTO-ENTMCNC: 33.8 G/DL (ref 32.3–36.5)
MCV RBC AUTO: 83.4 FL (ref 81.4–97.8)
MONOCYTES # BLD AUTO: 2.58 K/UL (ref 0–0.85)
MONOCYTES NFR BLD AUTO: 12.7 % (ref 0–13.4)
NEUTROPHILS # BLD AUTO: 16.34 K/UL (ref 1.82–7.42)
NEUTROPHILS NFR BLD: 80.5 % (ref 44–72)
NRBC # BLD AUTO: 0 K/UL
NRBC BLD-RTO: 0 /100 WBC (ref 0–0.2)
PLATELET # BLD AUTO: 211 K/UL (ref 164–446)
PLATELET BLD QL SMEAR: NORMAL
PMV BLD AUTO: 11.5 FL (ref 9–12.9)
POTASSIUM SERPL-SCNC: 3.7 MMOL/L (ref 3.6–5.5)
PROT SERPL-MCNC: 6.4 G/DL (ref 6–8.2)
RBC # BLD AUTO: 4.75 M/UL (ref 4.7–6.1)
RBC BLD AUTO: PRESENT
SODIUM SERPL-SCNC: 132 MMOL/L (ref 135–145)
WBC # BLD AUTO: 20.3 K/UL (ref 4.8–10.8)

## 2025-07-07 PROCEDURE — 82962 GLUCOSE BLOOD TEST: CPT | Performed by: INTERNAL MEDICINE

## 2025-07-07 PROCEDURE — 80053 COMPREHEN METABOLIC PANEL: CPT

## 2025-07-07 PROCEDURE — 85027 COMPLETE CBC AUTOMATED: CPT

## 2025-07-07 PROCEDURE — 87641 MR-STAPH DNA AMP PROBE: CPT

## 2025-07-07 PROCEDURE — 85007 BL SMEAR W/DIFF WBC COUNT: CPT

## 2025-07-07 PROCEDURE — 770022 HCHG ROOM/CARE - ICU (200)

## 2025-07-07 RX ORDER — HEPARIN SODIUM 5000 [USP'U]/ML
5000 INJECTION, SOLUTION INTRAVENOUS; SUBCUTANEOUS EVERY 8 HOURS
Status: DISCONTINUED | OUTPATIENT
Start: 2025-07-08 | End: 2025-07-15

## 2025-07-07 RX ORDER — DEXTROSE MONOHYDRATE 25 G/50ML
12.5-25 INJECTION, SOLUTION INTRAVENOUS PRN
Status: DISCONTINUED | OUTPATIENT
Start: 2025-07-07 | End: 2025-07-08

## 2025-07-07 ASSESSMENT — PAIN DESCRIPTION - PAIN TYPE: TYPE: ACUTE PAIN

## 2025-07-07 NOTE — PROGRESS NOTES
ABE INTENSIVIST DIRECT ADMISSION REPORT    Transferring facility: [facility]  Transferring physician: Dr Osiel Porras  Chief complaint: L foot osteomyelitis  Vitals: 101.1F, 112 bpm, 141/85, 95% on room air   Pertinent history & patient course: Noncompliant diabetic with foot wound and maggots. Xray with erosion. Vanc and Zosyn and 2 L crystalloid given. Sugar 454 and was given 10 U insulin. WBC 17.5. Lactic 3.1. Na 126. K 3.9. Bicarb 21.   Other issues: COPD, history of diabetic foot ulcers.   Code Status: presumed full  per transferring provider.  Further work up or recommendations prior to transfer: None  Consultants called prior to transfer and pertinent input from consultants: n/a  Patient accepted for transfer: yes  Consultants to be called upon arrival: n/a  Floor requested: ICU  ADT order placed for accepted patient: yes    Please inform the Intensivist upon assignment of an ICU bed and then again on arrival of the patient.

## 2025-07-08 ENCOUNTER — APPOINTMENT (OUTPATIENT)
Dept: RADIOLOGY | Facility: MEDICAL CENTER | Age: 70
DRG: 853 | End: 2025-07-08
Attending: INTERNAL MEDICINE
Payer: MEDICARE

## 2025-07-08 ENCOUNTER — APPOINTMENT (OUTPATIENT)
Dept: RADIOLOGY | Facility: MEDICAL CENTER | Age: 70
DRG: 853 | End: 2025-07-08
Attending: STUDENT IN AN ORGANIZED HEALTH CARE EDUCATION/TRAINING PROGRAM
Payer: MEDICARE

## 2025-07-08 PROBLEM — J96.01 ACUTE HYPOXIC RESPIRATORY FAILURE (HCC): Status: ACTIVE | Noted: 2025-07-08

## 2025-07-08 PROBLEM — R46.89 SELF NEGLECT: Chronic | Status: ACTIVE | Noted: 2025-07-08

## 2025-07-08 PROBLEM — R41.82 ALTERED MENTAL STATE: Status: ACTIVE | Noted: 2025-07-08

## 2025-07-08 PROBLEM — A41.9 SEPSIS (HCC): Status: ACTIVE | Noted: 2025-07-08

## 2025-07-08 LAB
ALBUMIN SERPL BCP-MCNC: 2.6 G/DL (ref 3.2–4.9)
ALBUMIN/GLOB SERPL: 0.7 G/DL
ALP SERPL-CCNC: 107 U/L (ref 30–99)
ALT SERPL-CCNC: 24 U/L (ref 2–50)
ANION GAP SERPL CALC-SCNC: 12 MMOL/L (ref 7–16)
AST SERPL-CCNC: 35 U/L (ref 12–45)
BILIRUB SERPL-MCNC: 1.5 MG/DL (ref 0.1–1.5)
BUN SERPL-MCNC: 27 MG/DL (ref 8–22)
CALCIUM ALBUM COR SERPL-MCNC: 9.6 MG/DL (ref 8.5–10.5)
CALCIUM SERPL-MCNC: 8.5 MG/DL (ref 8.5–10.5)
CHLORIDE SERPL-SCNC: 98 MMOL/L (ref 96–112)
CO2 SERPL-SCNC: 23 MMOL/L (ref 20–33)
CREAT SERPL-MCNC: 0.82 MG/DL (ref 0.5–1.4)
ERYTHROCYTE [DISTWIDTH] IN BLOOD BY AUTOMATED COUNT: 40.7 FL (ref 35.9–50)
GFR SERPLBLD CREATININE-BSD FMLA CKD-EPI: 95 ML/MIN/1.73 M 2
GLOBULIN SER CALC-MCNC: 3.6 G/DL (ref 1.9–3.5)
GLUCOSE BLD STRIP.AUTO-MCNC: 188 MG/DL (ref 65–99)
GLUCOSE BLD STRIP.AUTO-MCNC: 218 MG/DL (ref 65–99)
GLUCOSE BLD STRIP.AUTO-MCNC: 221 MG/DL (ref 65–99)
GLUCOSE BLD STRIP.AUTO-MCNC: 241 MG/DL (ref 65–99)
GLUCOSE BLD STRIP.AUTO-MCNC: 252 MG/DL (ref 65–99)
GLUCOSE SERPL-MCNC: 253 MG/DL (ref 65–99)
HCT VFR BLD AUTO: 36.6 % (ref 42–52)
HGB BLD-MCNC: 12.2 G/DL (ref 14–18)
HIV 1+2 AB+HIV1 P24 AG SERPL QL IA: NORMAL
MCH RBC QN AUTO: 27.9 PG (ref 27–33)
MCHC RBC AUTO-ENTMCNC: 33.3 G/DL (ref 32.3–36.5)
MCV RBC AUTO: 83.8 FL (ref 81.4–97.8)
PARASITE SPEC INSPECT: NORMAL
PLATELET # BLD AUTO: 200 K/UL (ref 164–446)
PMV BLD AUTO: 12.2 FL (ref 9–12.9)
POTASSIUM SERPL-SCNC: 4.8 MMOL/L (ref 3.6–5.5)
PROT SERPL-MCNC: 6.2 G/DL (ref 6–8.2)
RBC # BLD AUTO: 4.37 M/UL (ref 4.7–6.1)
SCCMEC + MECA PNL NOSE NAA+PROBE: NEGATIVE
SIGNIFICANT IND 70042: NORMAL
SITE SITE: NORMAL
SODIUM SERPL-SCNC: 133 MMOL/L (ref 135–145)
SOURCE SOURCE: NORMAL
T PALLIDUM AB SER QL IA: NORMAL
T4 FREE SERPL-MCNC: 1.57 NG/DL (ref 0.93–1.7)
TSH SERPL DL<=0.005 MIU/L-ACNC: 0.2 UIU/ML (ref 0.38–5.33)
VIT B12 SERPL-MCNC: 1097 PG/ML (ref 211–911)
WBC # BLD AUTO: 20.3 K/UL (ref 4.8–10.8)

## 2025-07-08 PROCEDURE — 73720 MRI LWR EXTREMITY W/O&W/DYE: CPT | Mod: LT

## 2025-07-08 PROCEDURE — 82962 GLUCOSE BLOOD TEST: CPT | Performed by: INTERNAL MEDICINE

## 2025-07-08 PROCEDURE — G0475 HIV COMBINATION ASSAY: HCPCS

## 2025-07-08 PROCEDURE — 82607 VITAMIN B-12: CPT

## 2025-07-08 PROCEDURE — 700111 HCHG RX REV CODE 636 W/ 250 OVERRIDE (IP): Mod: JZ | Performed by: HOSPITALIST

## 2025-07-08 PROCEDURE — 700105 HCHG RX REV CODE 258: Performed by: STUDENT IN AN ORGANIZED HEALTH CARE EDUCATION/TRAINING PROGRAM

## 2025-07-08 PROCEDURE — 700102 HCHG RX REV CODE 250 W/ 637 OVERRIDE(OP): Performed by: STUDENT IN AN ORGANIZED HEALTH CARE EDUCATION/TRAINING PROGRAM

## 2025-07-08 PROCEDURE — A9579 GAD-BASE MR CONTRAST NOS,1ML: HCPCS | Mod: JZ | Performed by: STUDENT IN AN ORGANIZED HEALTH CARE EDUCATION/TRAINING PROGRAM

## 2025-07-08 PROCEDURE — 700111 HCHG RX REV CODE 636 W/ 250 OVERRIDE (IP): Performed by: STUDENT IN AN ORGANIZED HEALTH CARE EDUCATION/TRAINING PROGRAM

## 2025-07-08 PROCEDURE — 84443 ASSAY THYROID STIM HORMONE: CPT

## 2025-07-08 PROCEDURE — 51798 US URINE CAPACITY MEASURE: CPT

## 2025-07-08 PROCEDURE — 71045 X-RAY EXAM CHEST 1 VIEW: CPT

## 2025-07-08 PROCEDURE — 99223 1ST HOSP IP/OBS HIGH 75: CPT | Performed by: INTERNAL MEDICINE

## 2025-07-08 PROCEDURE — 87169 MACROSCOPIC EXAM PARASITE: CPT

## 2025-07-08 PROCEDURE — 700111 HCHG RX REV CODE 636 W/ 250 OVERRIDE (IP): Performed by: INTERNAL MEDICINE

## 2025-07-08 PROCEDURE — 85027 COMPLETE CBC AUTOMATED: CPT

## 2025-07-08 PROCEDURE — 84439 ASSAY OF FREE THYROXINE: CPT

## 2025-07-08 PROCEDURE — 80053 COMPREHEN METABOLIC PANEL: CPT

## 2025-07-08 PROCEDURE — 99222 1ST HOSP IP/OBS MODERATE 55: CPT | Performed by: STUDENT IN AN ORGANIZED HEALTH CARE EDUCATION/TRAINING PROGRAM

## 2025-07-08 PROCEDURE — 700101 HCHG RX REV CODE 250: Performed by: STUDENT IN AN ORGANIZED HEALTH CARE EDUCATION/TRAINING PROGRAM

## 2025-07-08 PROCEDURE — 86780 TREPONEMA PALLIDUM: CPT

## 2025-07-08 PROCEDURE — 770020 HCHG ROOM/CARE - TELE (206)

## 2025-07-08 PROCEDURE — 94760 N-INVAS EAR/PLS OXIMETRY 1: CPT

## 2025-07-08 PROCEDURE — 99223 1ST HOSP IP/OBS HIGH 75: CPT | Mod: AI | Performed by: STUDENT IN AN ORGANIZED HEALTH CARE EDUCATION/TRAINING PROGRAM

## 2025-07-08 PROCEDURE — 700117 HCHG RX CONTRAST REV CODE 255: Mod: JZ | Performed by: STUDENT IN AN ORGANIZED HEALTH CARE EDUCATION/TRAINING PROGRAM

## 2025-07-08 RX ORDER — THIAMINE HYDROCHLORIDE 100 MG/ML
100 INJECTION, SOLUTION INTRAMUSCULAR; INTRAVENOUS DAILY
Status: COMPLETED | OUTPATIENT
Start: 2025-07-08 | End: 2025-07-10

## 2025-07-08 RX ORDER — ONDANSETRON 2 MG/ML
4 INJECTION INTRAMUSCULAR; INTRAVENOUS EVERY 4 HOURS PRN
Status: DISCONTINUED | OUTPATIENT
Start: 2025-07-08 | End: 2025-07-18 | Stop reason: HOSPADM

## 2025-07-08 RX ORDER — INSULIN LISPRO 100 [IU]/ML
0-14 INJECTION, SOLUTION INTRAVENOUS; SUBCUTANEOUS
Status: DISCONTINUED | OUTPATIENT
Start: 2025-07-08 | End: 2025-07-08

## 2025-07-08 RX ORDER — DEXTROSE MONOHYDRATE 25 G/50ML
12.5-25 INJECTION, SOLUTION INTRAVENOUS PRN
Status: DISCONTINUED | OUTPATIENT
Start: 2025-07-08 | End: 2025-07-08

## 2025-07-08 RX ORDER — DEXTROSE MONOHYDRATE 25 G/50ML
25 INJECTION, SOLUTION INTRAVENOUS
Status: DISCONTINUED | OUTPATIENT
Start: 2025-07-08 | End: 2025-07-08

## 2025-07-08 RX ORDER — INSULIN LISPRO 100 [IU]/ML
2-9 INJECTION, SOLUTION INTRAVENOUS; SUBCUTANEOUS
Status: DISCONTINUED | OUTPATIENT
Start: 2025-07-08 | End: 2025-07-08

## 2025-07-08 RX ORDER — GADOTERIDOL 279.3 MG/ML
20 INJECTION INTRAVENOUS ONCE
Status: COMPLETED | OUTPATIENT
Start: 2025-07-08 | End: 2025-07-08

## 2025-07-08 RX ORDER — INSULIN LISPRO 100 [IU]/ML
3-14 INJECTION, SOLUTION INTRAVENOUS; SUBCUTANEOUS EVERY 6 HOURS
Status: DISCONTINUED | OUTPATIENT
Start: 2025-07-08 | End: 2025-07-18 | Stop reason: HOSPADM

## 2025-07-08 RX ORDER — DEXTROSE MONOHYDRATE 25 G/50ML
25 INJECTION, SOLUTION INTRAVENOUS
Status: DISCONTINUED | OUTPATIENT
Start: 2025-07-08 | End: 2025-07-18 | Stop reason: HOSPADM

## 2025-07-08 RX ADMIN — INSULIN GLARGINE-YFGN 15 UNITS: 100 INJECTION, SOLUTION SUBCUTANEOUS at 00:53

## 2025-07-08 RX ADMIN — INSULIN LISPRO 3 UNITS: 100 INJECTION, SOLUTION INTRAVENOUS; SUBCUTANEOUS at 17:35

## 2025-07-08 RX ADMIN — INSULIN LISPRO 5 UNITS: 100 INJECTION, SOLUTION INTRAVENOUS; SUBCUTANEOUS at 00:52

## 2025-07-08 RX ADMIN — GADOTERIDOL 20 ML: 279.3 INJECTION, SOLUTION INTRAVENOUS at 17:10

## 2025-07-08 RX ADMIN — VANCOMYCIN HYDROCHLORIDE 1250 MG: 5 INJECTION, POWDER, LYOPHILIZED, FOR SOLUTION INTRAVENOUS at 07:47

## 2025-07-08 RX ADMIN — ONDANSETRON 4 MG: 2 INJECTION INTRAMUSCULAR; INTRAVENOUS at 20:14

## 2025-07-08 RX ADMIN — CEFEPIME 2 G: 2 INJECTION, POWDER, FOR SOLUTION INTRAVENOUS at 17:34

## 2025-07-08 RX ADMIN — INSULIN GLARGINE-YFGN 15 UNITS: 100 INJECTION, SOLUTION SUBCUTANEOUS at 17:35

## 2025-07-08 RX ADMIN — VANCOMYCIN HYDROCHLORIDE 1250 MG: 5 INJECTION, POWDER, LYOPHILIZED, FOR SOLUTION INTRAVENOUS at 18:23

## 2025-07-08 RX ADMIN — CEFEPIME 2 G: 2 INJECTION, POWDER, FOR SOLUTION INTRAVENOUS at 05:21

## 2025-07-08 RX ADMIN — CEFEPIME 2 G: 2 INJECTION, POWDER, FOR SOLUTION INTRAVENOUS at 01:01

## 2025-07-08 RX ADMIN — INSULIN LISPRO 4 UNITS: 100 INJECTION, SOLUTION INTRAVENOUS; SUBCUTANEOUS at 11:32

## 2025-07-08 RX ADMIN — HEPARIN SODIUM 5000 UNITS: 5000 INJECTION, SOLUTION INTRAVENOUS; SUBCUTANEOUS at 00:54

## 2025-07-08 RX ADMIN — THIAMINE HYDROCHLORIDE 100 MG: 100 INJECTION, SOLUTION INTRAMUSCULAR; INTRAVENOUS at 05:19

## 2025-07-08 RX ADMIN — INSULIN LISPRO 3 UNITS: 100 INJECTION, SOLUTION INTRAVENOUS; SUBCUTANEOUS at 06:20

## 2025-07-08 SDOH — ECONOMIC STABILITY: TRANSPORTATION INSECURITY
IN THE PAST 12 MONTHS, HAS THE LACK OF TRANSPORTATION KEPT YOU FROM MEDICAL APPOINTMENTS OR FROM GETTING MEDICATIONS?: NO

## 2025-07-08 SDOH — ECONOMIC STABILITY: TRANSPORTATION INSECURITY
IN THE PAST 12 MONTHS, HAS LACK OF RELIABLE TRANSPORTATION KEPT YOU FROM MEDICAL APPOINTMENTS, MEETINGS, WORK OR FROM GETTING THINGS NEEDED FOR DAILY LIVING?: NO

## 2025-07-08 ASSESSMENT — LIFESTYLE VARIABLES
TOTAL SCORE: 0
CONSUMPTION TOTAL: NEGATIVE
ALCOHOL_USE: NO
ON A TYPICAL DAY WHEN YOU DRINK ALCOHOL HOW MANY DRINKS DO YOU HAVE: 0
TOTAL SCORE: 0
HAVE YOU EVER FELT YOU SHOULD CUT DOWN ON YOUR DRINKING: NO
EVER HAD A DRINK FIRST THING IN THE MORNING TO STEADY YOUR NERVES TO GET RID OF A HANGOVER: NO
HAVE PEOPLE ANNOYED YOU BY CRITICIZING YOUR DRINKING: NO
AVERAGE NUMBER OF DAYS PER WEEK YOU HAVE A DRINK CONTAINING ALCOHOL: 0
HOW MANY TIMES IN THE PAST YEAR HAVE YOU HAD 5 OR MORE DRINKS IN A DAY: 0
DOES PATIENT WANT TO STOP DRINKING: NO
TOTAL SCORE: 0
EVER FELT BAD OR GUILTY ABOUT YOUR DRINKING: NO

## 2025-07-08 ASSESSMENT — PAIN DESCRIPTION - PAIN TYPE
TYPE: ACUTE PAIN

## 2025-07-08 ASSESSMENT — COGNITIVE AND FUNCTIONAL STATUS - GENERAL
WALKING IN HOSPITAL ROOM: TOTAL
MOBILITY SCORE: 11
STANDING UP FROM CHAIR USING ARMS: A LOT
CLIMB 3 TO 5 STEPS WITH RAILING: TOTAL
SUGGESTED CMS G CODE MODIFIER MOBILITY: CL
SUGGESTED CMS G CODE MODIFIER DAILY ACTIVITY: CK
HELP NEEDED FOR BATHING: A LOT
MOVING FROM LYING ON BACK TO SITTING ON SIDE OF FLAT BED: A LOT
MOVING TO AND FROM BED TO CHAIR: A LOT
DRESSING REGULAR UPPER BODY CLOTHING: A LITTLE
DAILY ACTIVITIY SCORE: 19
TURNING FROM BACK TO SIDE WHILE IN FLAT BAD: A LITTLE
TOILETING: A LOT

## 2025-07-08 ASSESSMENT — SOCIAL DETERMINANTS OF HEALTH (SDOH)
WITHIN THE LAST YEAR, HAVE TO BEEN RAPED OR FORCED TO HAVE ANY KIND OF SEXUAL ACTIVITY BY YOUR PARTNER OR EX-PARTNER?: NO
WITHIN THE PAST 12 MONTHS, YOU WORRIED THAT YOUR FOOD WOULD RUN OUT BEFORE YOU GOT THE MONEY TO BUY MORE: PATIENT DECLINED
IN THE PAST 12 MONTHS, HAS THE ELECTRIC, GAS, OIL, OR WATER COMPANY THREATENED TO SHUT OFF SERVICE IN YOUR HOME?: PATIENT DECLINED
WITHIN THE LAST YEAR, HAVE YOU BEEN KICKED, HIT, SLAPPED, OR OTHERWISE PHYSICALLY HURT BY YOUR PARTNER OR EX-PARTNER?: NO
WITHIN THE LAST YEAR, HAVE YOU BEEN HUMILIATED OR EMOTIONALLY ABUSED IN OTHER WAYS BY YOUR PARTNER OR EX-PARTNER?: NO
WITHIN THE PAST 12 MONTHS, THE FOOD YOU BOUGHT JUST DIDN'T LAST AND YOU DIDN'T HAVE MONEY TO GET MORE: PATIENT DECLINED
WITHIN THE LAST YEAR, HAVE YOU BEEN AFRAID OF YOUR PARTNER OR EX-PARTNER?: NO

## 2025-07-08 ASSESSMENT — PATIENT HEALTH QUESTIONNAIRE - PHQ9
SUM OF ALL RESPONSES TO PHQ9 QUESTIONS 1 AND 2: 0
2. FEELING DOWN, DEPRESSED, IRRITABLE, OR HOPELESS: NOT AT ALL
1. LITTLE INTEREST OR PLEASURE IN DOING THINGS: NOT AT ALL

## 2025-07-08 ASSESSMENT — FIBROSIS 4 INDEX: FIB4 SCORE: 2.46

## 2025-07-08 NOTE — PROGRESS NOTES
Medication history reviewed with pt. Med rec is complete.  Allergies reviewed, per pt    Pt reports no prescription medications, OTC's, or vitamins in the last 30 days or longer.    Any outpatient anti-MICROBIAL in the last 30 days?  NO    Any antibiotics in the last 30 days or longer? NO    Pt is not on any anticoagulants

## 2025-07-08 NOTE — H&P
Hospital Medicine History & Physical Note    Date of Service  7/8/2025    Primary Care Physician  Pcp Not In Computer    Consultants  None    Code Status  Full Code    Chief Complaint  Foot pain      History of Presenting Illness  Jimmy Amaya is a 70 y.o. male w/ PMH of DM2 uncontrolled who presented 7/7/2025 with foot pain L. Sent from OSH after being found with foot wound with maggots present, also concern for bedbugs, XR at OSH showed erosion suggestive of osteo. He was given Vanc and Zosyn for this as well as insulin for his hyperglycemia of 454. Also noted to be febrile, tachycardic, and have leukocytosis with elevated lactate so he was given 2 L of IVF.   Upon arrival to the ICU pt was slightly altered vitals showed improved tachycardia. Labs showed stable leukocytosis, mild hyponatremia w/ improved hyperglycemia.     I discussed the plan of care with patient and family.    Review of Systems  Review of Systems   Unable to perform ROS: Medical condition       Past Medical History   has a past medical history of Diabetes (HCC).    Surgical History   has a past surgical history that includes dental extraction(s); incision and drainage orthopedic (Left, 1/5/2023); orif, finger (Right, 12/7/2023); wound irrigation & debridement (Right, 12/7/2023); and extensor tendon repair (Right, 12/7/2023).     Family History  Family History   Problem Relation Age of Onset    Cancer Father         Family history reviewed with patient. There is no family history that is pertinent to the chief complaint.     Social History   reports that he has never smoked. He has never used smokeless tobacco. He reports that he does not currently use alcohol. He reports that he does not use drugs.    Allergies  Allergies[1]    Medications  None       Physical Exam  Temp:  [37.1 °C (98.8 °F)-37.2 °C (99 °F)] 37.1 °C (98.8 °F)  Pulse:  [] 94  Resp:  [18-32] 27  BP: (118-133)/(55-75) 118/55  SpO2:  [90 %-96 %] 96 %  Blood Pressure :  "118/55   Temperature: 37.1 °C (98.8 °F)   Pulse: 94   Respiration: (!) 27   Pulse Oximetry: 96 %       Physical Exam  Constitutional:       Appearance: He is ill-appearing.   HENT:      Head: Normocephalic and atraumatic.      Nose: Nose normal.      Mouth/Throat:      Mouth: Mucous membranes are moist.      Pharynx: Oropharynx is clear.   Eyes:      Conjunctiva/sclera: Conjunctivae normal.      Pupils: Pupils are equal, round, and reactive to light.   Cardiovascular:      Rate and Rhythm: Normal rate and regular rhythm.      Heart sounds: No murmur heard.  Pulmonary:      Effort: Pulmonary effort is normal.      Breath sounds: No wheezing, rhonchi or rales.   Abdominal:      General: There is no distension.      Palpations: Abdomen is soft.      Tenderness: There is no abdominal tenderness. There is no guarding or rebound.   Musculoskeletal:         General: Swelling, tenderness and deformity present.      Cervical back: Normal range of motion and neck supple.      Left lower leg: Edema present.   Skin:     General: Skin is warm and dry.   Neurological:      Mental Status: He is alert. He is disoriented.      GCS: GCS eye subscore is 4. GCS verbal subscore is 5. GCS motor subscore is 6.      Cranial Nerves: No facial asymmetry.   Psychiatric:         Mood and Affect: Mood normal.         Behavior: Behavior normal.         Laboratory:  Recent Labs     07/07/25  2243   WBC 20.3*   RBC 4.75   HEMOGLOBIN 13.4*   HEMATOCRIT 39.6*   MCV 83.4   MCH 28.2   MCHC 33.8   RDW 40.5   PLATELETCT 211   MPV 11.5     Recent Labs     07/07/25  2243   SODIUM 132*   POTASSIUM 3.7   CHLORIDE 95*   CO2 23   GLUCOSE 264*   BUN 31*   CREATININE 0.94   CALCIUM 8.8     Recent Labs     07/07/25  2243   ALTSGPT 22   ASTSGOT 28   ALKPHOSPHAT 113*   TBILIRUBIN 1.9*   GLUCOSE 264*         No results for input(s): \"NTPROBNP\" in the last 72 hours.      No results for input(s): \"TROPONINT\" in the last 72 hours.    Imaging:  No orders to display "       X-Ray:  I have personally reviewed the images and compared with prior images.    Assessment/Plan:  Justification for Admission Status  I anticipate this patient will require at least two midnights for appropriate medical management, necessitating inpatient admission because osteo    Patient will need a ICU (Adult and Pediatrics) bed on MEDICAL service .  The need is secondary to osteo.    * Diabetic foot infection (HCC)- (present on admission)  Assessment & Plan  Hx of prior wounds on the foot now worse, XR showing osteo, 4/4 SIRS given sepsis bolus at OSH not hypotensive  -Vanco and cefepime  -IVF  -NPO at midnight for possible surgery, SCD for DVT ppx  -LPS consult  -MRI    Sepsis (HCC)  Assessment & Plan  This is Sepsis Present on admission  SIRS criteria identified on my evaluation include: Fever, with temperature greater than 100.9 deg F, Tachycardia, with heart rate greater than 90 BPM, Tachypnea, with respirations greater than 20 per minute, and Leukocytosis, with WBC greater than 12,000  Clinical indicators of end organ dysfunction include Lactic Acid greater than 2  Source is osteo L foot  Sepsis protocol initiated  Crystalloid Fluid Administration: Fluid resuscitation ordered per standard protocol - 30 mL/kg per current or ideal body weight at OSH given  IV antibiotics as appropriate for source of sepsis  Reassessment: I have reassessed the patient's hemodynamic status    See foot infxn for mgmt    Altered mental state  Assessment & Plan  Exacerbated by his infxn, concern for baseline mentation decline per sister, house is full of garbage and pt had maggots in his wound found wandering high way  -Supportive care  -IV thiamine  -CM consult     Type 2 diabetes mellitus with diabetic polyneuropathy (HCC)- (present on admission)  Assessment & Plan  Uncontrolled due to noncompliance, no sign of DKA/HOS  -15 u of Lantus and SSI  -Accuchecks and hypoglycemia protocol        VTE prophylaxis: SCDs/TEDs          [1] No Known Allergies

## 2025-07-08 NOTE — ASSESSMENT & PLAN NOTE
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

## 2025-07-08 NOTE — PROGRESS NOTES
Daughter Gabby White [phone] pts daughter would like to be apart of rounds. This RN to pass information to day shift RN.

## 2025-07-08 NOTE — ASSESSMENT & PLAN NOTE
Psychiatry consulted for competency evaluation at behest of NOK  He apparently has had a steady decline recently and lives in an unsanitary RV alone. He reportedly does not take any medications or check his BG.   Ordered TSH, B12, HIV, Syphilis screen  Consider neuroimaging

## 2025-07-08 NOTE — ASSESSMENT & PLAN NOTE
This is Sepsis Present on admission  SIRS criteria identified on my evaluation include: Fever, with temperature greater than 100.9 deg F, Tachycardia, with heart rate greater than 90 BPM, Tachypnea, with respirations greater than 20 per minute, and Leukocytosis, with WBC greater than 12,000  Clinical indicators of end organ dysfunction include Lactic Acid greater than 2  Source is osteo L foot and bacteremia with Streptococcus viridans  Sepsis protocol initiated  Crystalloid Fluid Administration: Fluid resuscitation ordered per standard protocol - 30 mL/kg per current or ideal body weight at OSH given   antibiotics as appropriate for source of sepsis  Reassessment: I have reassessed the patient's hemodynamic status    blood cultures positive at [facility] - strep alpha and strep viridans  Continue IV Unasyn  Repeat Bcx NGTD

## 2025-07-08 NOTE — ASSESSMENT & PLAN NOTE
Uncontrolled due to noncompliance, no sign of DKA/HOS  Continue glargine 15 units nightly  Continue insulin sliding scale, Accu-Cheks and hypoglycemic protocol

## 2025-07-08 NOTE — ASSESSMENT & PLAN NOTE
Acute hypoxic respiratory failure secondary to acute illness/atelectasis  CXR ordered  -Encourage IS q15min while awake  -Mobilize when able  -Target O2 sat 88-92%

## 2025-07-08 NOTE — PROGRESS NOTES
"Pharmacy Vancomycin Kinetics Note for 2025     70 y.o. male on Vancomycin day # 1     Vancomycin Indication (AUC Dosing): Osteomyelitis    Active Antibiotics (From admission, onward)      Ordered     Ordering Provider        12:41 AM    25 0041  vancomycin 1250 mg/250mL NS IVPB premix  (vancomycin (VANCOCIN) IV (LD + Maintenance))  EVERY 12 HOURS         Johan Hearn D.O.        10:58 PM    25  cefepime (Maxipime) 2 g in  mL IVPB  EVERY 12 HOURS         Johan Hearn D.O.    25  MD Alert...Vancomycin per Pharmacy  (MD Alert...Vancomycin per Pharmacy)  PHARMACY TO DOSE        Question:  Indication(s) for vancomycin?  Answer:  Osteomyelitis    Johan Hearn D.O.            Dosing Weight: 107 kg (235 lb 14.3 oz)      Admission History: Admitted on 2025 for Diabetic foot infection (HCC) [E11.628, L08.9]  Pertinent history: Patient transferred for foot infection.    Allergies:     Patient has no known allergies.     Pertinent cultures to date:     Results       Procedure Component Value Units Date/Time    MRSA By PCR (Amp) [[id number]] Collected: 25    Order Status: Sent Specimen: Respirate from Nares Updated: 25 2331            Labs:     Estimated Creatinine Clearance: 96.6 mL/min (by C-G formula based on SCr of 0.94 mg/dL).  Recent Labs     25  2243   WBC 20.3*   NEUTSPOLYS 80.50*     Recent Labs     253   BUN 31*   CREATININE 0.94   ALBUMIN 2.8*     No intake or output data in the 24 hours ending 25 0041   /64   Pulse (!) 101   Temp 37.1 °C (98.8 °F) (Temporal)   Resp (!) 32   Ht 1.88 m (6' 2\")   Wt 107 kg (236 lb 12.4 oz)   SpO2 93%  Temp (24hrs), Av.2 °C (98.9 °F), Min:37.1 °C (98.8 °F), Max:37.2 °C (99 °F)      List concerns for Vancomycin clearance:     Obesity    Pharmacokinetics:     AUC kinetics:   Ke (hr ^-1): 0.0849 hr^-1  Half life: 8.16 hr  Clearance: " 4.806  Estimated TDD: 2403  Estimated Dose: 1021  Estimated interval: 10.2    A/P:     -  Vancomycin dose: 1250mg iv q12h    -  Predicted vancomycin AUC from initial AUC test calculator: 520 mg·hr/L    -  Comments: Patient given 2000mg (20mg/kg) at roughly 1900, will start twice daily dosing given age and anticipated clearance. Renal indices slightly elevated from baseline, anticipate improvement with hydration and glycemic control. Recommend levels at steady state. MRSA nares pending.       Greg Valles, PharmD

## 2025-07-08 NOTE — DIETARY
Nutrition Services: Follow-up for Nutrition Care Plan   Day 1 of admit. Jimmy Amaya is a 70 y.o. male with admitting DX of Diabetic foot infection (HCC) [E11.628, L08.9]    Presence of wound reported on nutrition screen. Pt w/ WT consult for wound on midline back and left foot ulcer. Per provider consult note, pt w/ diabetic foot infection that is highly suspicious for osteomyelitis. Orthopedic surgery consult pending.     Current diet order:   NPO     RD following

## 2025-07-08 NOTE — CARE PLAN
The patient is Watcher - Medium risk of patient condition declining or worsening    Shift Goals  Clinical Goals: IV Abx, monitor labs  Patient Goals: Rest, ortho consult  Family Goals: Updates    Problem: Knowledge Deficit - Standard  Goal: Patient and family/care givers will demonstrate understanding of plan of care, disease process/condition, diagnostic tests and medications  Outcome: Progressing  Note: POC discussed with pt, pt agreeable.      Problem: Skin Integrity  Goal: Skin integrity is maintained or improved  Outcome: Progressing  Note: This RN preformed wound care on pts left foot and midline back, pt agreeable to wound care. Pt agreeable to silicone foam placement and floating of heels.      Problem: Fall Risk  Goal: Patient will remain free from falls  Outcome: Progressing  Note: Pt remained free of falls throughout the shift, all fall precautions in place.      Problem: Pain - Standard  Goal: Alleviation of pain or a reduction in pain to the patient’s comfort goal  Outcome: Progressing  Note: Pt denied pain throughout the night states left foot is only painful when moved or touched. Pt able to sleep and rest throughout the night appears comfortable.        Progress made toward(s) clinical / shift goals:  See above.

## 2025-07-08 NOTE — PROGRESS NOTES
Update:    Spoke to Dr. Cynthia Wood, who performed a capacity assessment. Patient with MOCHA 26/30 and findings c/w possible vascular dementia. Per Dr. Wood, the patient does no tappear to have capacity at this time due to cognitive deficits, particularly regarding memory and executive functioning.     Per [place] law, the daughter Gabby (only offspring) is the legal DPOA and surrogate decision maker. I have notified her of these results.     Jesus Jolly DO, [facility]  Staff Pulmonologist and Intensivist  [facility]     Please note that this dictation was created using voice recognition software. The accuracy of the dictation is limited to the abilities of the software. I have made every reasonable attempt to correct obvious errors, but I expect that there are errors of grammar and possibly content that I did not discover before finalizing the note.

## 2025-07-08 NOTE — PROGRESS NOTES
4 Eyes Skin Assessment Completed by DEBRA Pritchard and DEBRA Styles.    Skin assessment is primarily focused on high risk bony prominences. Pay special attention to skin beneath and around medical devices, high risk bony prominences, skin to skin areas and areas where the patient lacks sensation to feel pain and areas where the patient previously had breakdown.     Head (Occipital):  Abrasion     Ears (Under Medical Devices): WDL   Nose (Under Medical Devices): WDL   Mouth:  WDL   Neck: WDL   Breast/Chest:  WDL   Shoulder Blades:  WDL   Spine:   Abrasion     (R) Arm/Elbow/Hand: Boggy elbow red and discolored.      (L) Arm/Elbow/Hand: Boggy elbow red and discolored    Abdomen: WDL   Pannus/Groin:  Red   Sacrum/Coccyx:   Pink/ discolored     (R) Ischial Tuberosity (Sit Bones):  discolored   (L) Ischial Tuberosity (Sit Bones):  Discolored    (R) Leg:  WDL   (L) Leg:  WDL   (R) Heel:  Red/ boggy/ discolored   (R) Foot/Toe:   Red/ callused    (L) Heel: Red/ discolored/ boggy   (L) Foot/Toe:  Ulcer of lower extremity or foot, Pink, Red, Purple/maroon, Light Purple, Black, Edema, and Weeping, blistering                   DEVICES IN USE:   Respiratory Devices:  Nasal cannula and Pulse ox  Feeding Devices:  N/A   Lines & BP Monitoring Devices:  Peripheral IV, BP cuff, and Pulse ox    Orthopedic Devices:  N/A  Miscellaneous Devices:  N/A    PROTOCOL INTERVENTIONS:   ICU Low Airloss Bed:  Already in place  Elbows Padded with Offloading Dressing:  Already in place  Offloading Dressing - Sacrum:  Already in place  Offloading Dressing - Heel:  Already in place  Float Heels with Pillows:  Already in place  Glide Sheet:  Already in place  Barrier Paste:  Already in place  Silicone Nasal Cannula Tubing:  Already in place and Reinforced/reapplied  Nasal Cannula with Gray Foams:  Already in place    WOUND PHOTOS:   Completed and in EPIC     WOUND CONSULT:   Consult ordered for the following areas midline back and left foot

## 2025-07-08 NOTE — CONSULTS
Critical Care Consultation    Date of Service  7/8/2025    Referring Physician  Dr. Johan Hearn    Consulting Physician  Jesus Jolly D.O.    Reason for Consultation  Osteomyelitis     History of Presenting Illness  Noncompliant diabetic with foot wound and maggots. Xray with erosion. Vanc and Zosyn and 2 L crystalloid given. Sugar 454 and was given 10 U insulin. WBC 17.5. Lactic 3.1. Na 126. K 3.9. Bicarb 21.   Other issues: COPD, history of diabetic foot ulcers.     Admitted to [facility] ICU on 7/7 for insulin drip.     24h events: 92-98% on 2-4 lpm.   Tubes, Lines, Drains: PIVs  Drips: n/a  Nutrition: NPO  Notable lab trends: WBC 20.3, Glc 253  Imaging: MRI pending  Tmax: afebrile  ProCal: n/a  Antibiotics: Cefepime (7/7 - )   Steroids: n/a  Cultures: n/a  I/O: tracking  PPX: SCDs  BM regimen: MiraLAX, senna-docusate, milk magnesia, bisacodyl  Last BM: prior      Review of Systems  Review of Systems   Unable to perform ROS: Acuity of condition       Past Medical History   has a past medical history of Diabetes (HCC).    Surgical History   has a past surgical history that includes dental extraction(s); incision and drainage orthopedic (Left, 1/5/2023); orif, finger (Right, 12/7/2023); wound irrigation & debridement (Right, 12/7/2023); and extensor tendon repair (Right, 12/7/2023).    Family History  Family History   Problem Relation Age of Onset    Cancer Father        Social History   reports that he has never smoked. He has never used smokeless tobacco. He reports that he does not currently use alcohol. He reports that he does not use drugs.    Medications  None       Allergies  Allergies[1]    Physical Exam  Temp:  [37.1 °C (98.8 °F)-37.2 °C (99 °F)] 37.1 °C (98.8 °F)  Pulse:  [] 90  Resp:  [18-32] 27  BP: (108-133)/(55-75) 124/60  SpO2:  [90 %-98 %] 92 %    Physical Exam  Vitals reviewed.   Constitutional:       General: He is awake. He is not in acute distress.     Appearance: He is  obese. He is ill-appearing. He is not toxic-appearing or diaphoretic.   HENT:      Mouth/Throat:      Mouth: Mucous membranes are moist.   Eyes:      General:         Right eye: No discharge.         Left eye: No discharge.      Pupils: Pupils are equal, round, and reactive to light.   Cardiovascular:      Rate and Rhythm: Normal rate.      Pulses: Normal pulses.   Pulmonary:      Effort: Pulmonary effort is normal.      Breath sounds: Normal breath sounds.   Abdominal:      Palpations: Abdomen is soft.   Musculoskeletal:         General: Swelling, deformity (Left foot eschar and infection) and signs of injury present.   Skin:     Capillary Refill: Capillary refill takes less than 2 seconds.   Neurological:      Mental Status: He is alert, oriented to person, place, and time and easily aroused.   Psychiatric:         Behavior: Behavior normal. Behavior is cooperative.         Fluids      Laboratory  Recent Labs     07/07/25 2243 07/08/25  0325   WBC 20.3* 20.3*   RBC 4.75 4.37*   HEMOGLOBIN 13.4* 12.2*   HEMATOCRIT 39.6* 36.6*   MCV 83.4 83.8   MCH 28.2 27.9   MCHC 33.8 33.3   RDW 40.5 40.7   PLATELETCT 211 200   MPV 11.5 12.2     Recent Labs     07/07/25 2243 07/08/25  0325   SODIUM 132* 133*   POTASSIUM 3.7 4.8   CHLORIDE 95* 98   CO2 23 23   GLUCOSE 264* 253*   BUN 31* 27*   CREATININE 0.94 0.82   CALCIUM 8.8 8.5                     Imaging  MR-FOOT-WITH & W/O LEFT    (Results Pending)     Laboratory Data: I personally reviewed labs including historical lab trends.     There is no immunization history for the selected administration types on file for this patient.    PFTs as reviewed by me personally show: n/a    Imaging as reviewed by me personally show:  n/a    Assessment/Plan:    ICU Problem list:  #Osteomyelitis of diabetic foot   #Acute hypoxic respiratory failure secondary to acute illness/atelectasis  #Hyperglycemia 2/2 poorly controlled DM  #Self neglect - dementia vs. Delirum   #Chronic atelectasis  secondary to obesity      Assessment/Plan  * Diabetic foot infection (HCC)- (present on admission)  Assessment & Plan  MRI pending  Highly suspicious for osteomyelitis  On Cefepime and Vancomycin   Orthopedic surgery consulted - Dr. Jacques Youngblood     Sepsis (HCC)- (present on admission)  Assessment & Plan  Met SIRS criteria on admission   Improving  WBC continues to be >20 due to diabetic foot infection   On Cefepime/Vanc    Acute hypoxic respiratory failure (HCC)- (present on admission)  Assessment & Plan  Acute hypoxic respiratory failure secondary to acute illness/atelectasis  CXR ordered  -Encourage IS q15min while awake  -Mobilize when able  -Target O2 sat 88-92%      Self neglect- (present on admission)  Assessment & Plan  Psychiatry consulted for competency evaluation at behest of NOK  He apparently has had a steady decline recently and lives in an unsanitary  alone. He reportedly does not take any medications or check his BG.   Ordered TSH, B12, HIV, Syphilis screen  Consider neuroimaging     Type 2 diabetes mellitus with diabetic polyneuropathy (HCC)- (present on admission)  Assessment & Plan  Self-neglect leading to very poor disease control   Monitor  SSI  Target glc 140-180        Dispo: Downgrade to telemetry    Total consult time: 85 minutes which included time spent on chart review, personally reviewing pertinent images and labs, time spent counseling and educating the patient and/or family members, and coordinating care with the healthcare team to include consultants.       Jesus Jolly DO, [facility]  Staff Pulmonologist and Intensivist  [facility]     Please note that this dictation was created using voice recognition software. The accuracy of the dictation is limited to the abilities of the software. I have made every reasonable attempt to correct obvious errors, but I expect that there are errors of grammar and possibly content that I did not discover before finalizing the note.             [1] No Known Allergies

## 2025-07-08 NOTE — ASSESSMENT & PLAN NOTE
Exacerbated by his infxn, concern for baseline mentation decline per sister, house is full of garbage and pt had maggots in his wound found wandering [place]    Patient is back to his baseline

## 2025-07-08 NOTE — ASSESSMENT & PLAN NOTE
MRI pending  Highly suspicious for osteomyelitis  On Cefepime and Vancomycin   Orthopedic surgery consulted - Dr. Jacques Youngblood

## 2025-07-08 NOTE — DISCHARGE PLANNING
Received a message from ICU RN Elena to call daughter Gabby. CM attempted x2 to call [phone] but no answer and unable to leave any message because  is full.

## 2025-07-08 NOTE — ASSESSMENT & PLAN NOTE
Met SIRS criteria on admission   Improving  WBC continues to be >20 due to diabetic foot infection   On Cefepime/Vanc

## 2025-07-09 ENCOUNTER — APPOINTMENT (OUTPATIENT)
Dept: RADIOLOGY | Facility: MEDICAL CENTER | Age: 70
DRG: 853 | End: 2025-07-09
Attending: INTERNAL MEDICINE
Payer: MEDICARE

## 2025-07-09 ENCOUNTER — ANESTHESIA EVENT (OUTPATIENT)
Dept: SURGERY | Facility: MEDICAL CENTER | Age: 70
DRG: 853 | End: 2025-07-09
Payer: MEDICARE

## 2025-07-09 ENCOUNTER — ANESTHESIA (OUTPATIENT)
Dept: SURGERY | Facility: MEDICAL CENTER | Age: 70
DRG: 853 | End: 2025-07-09
Payer: MEDICARE

## 2025-07-09 PROBLEM — R06.6 HICCUPS: Status: ACTIVE | Noted: 2025-07-09

## 2025-07-09 LAB
ALBUMIN SERPL BCP-MCNC: 2.6 G/DL (ref 3.2–4.9)
ALBUMIN/GLOB SERPL: 0.8 G/DL
ALP SERPL-CCNC: 113 U/L (ref 30–99)
ALT SERPL-CCNC: 20 U/L (ref 2–50)
ANION GAP SERPL CALC-SCNC: 13 MMOL/L (ref 7–16)
AST SERPL-CCNC: 32 U/L (ref 12–45)
BILIRUB SERPL-MCNC: 1.1 MG/DL (ref 0.1–1.5)
BUN SERPL-MCNC: 23 MG/DL (ref 8–22)
CALCIUM ALBUM COR SERPL-MCNC: 9.5 MG/DL (ref 8.5–10.5)
CALCIUM SERPL-MCNC: 8.4 MG/DL (ref 8.5–10.5)
CHLORIDE SERPL-SCNC: 104 MMOL/L (ref 96–112)
CO2 SERPL-SCNC: 19 MMOL/L (ref 20–33)
CREAT SERPL-MCNC: 0.65 MG/DL (ref 0.5–1.4)
ERYTHROCYTE [DISTWIDTH] IN BLOOD BY AUTOMATED COUNT: 45.8 FL (ref 35.9–50)
GFR SERPLBLD CREATININE-BSD FMLA CKD-EPI: 102 ML/MIN/1.73 M 2
GLOBULIN SER CALC-MCNC: 3.4 G/DL (ref 1.9–3.5)
GLUCOSE BLD STRIP.AUTO-MCNC: 172 MG/DL (ref 65–99)
GLUCOSE BLD STRIP.AUTO-MCNC: 176 MG/DL (ref 65–99)
GLUCOSE BLD STRIP.AUTO-MCNC: 189 MG/DL (ref 65–99)
GLUCOSE BLD STRIP.AUTO-MCNC: 197 MG/DL (ref 65–99)
GLUCOSE BLD STRIP.AUTO-MCNC: 233 MG/DL (ref 65–99)
GLUCOSE SERPL-MCNC: 173 MG/DL (ref 65–99)
HCT VFR BLD AUTO: 41.6 % (ref 42–52)
HGB BLD-MCNC: 12.9 G/DL (ref 14–18)
MCH RBC QN AUTO: 27.7 PG (ref 27–33)
MCHC RBC AUTO-ENTMCNC: 31 G/DL (ref 32.3–36.5)
MCV RBC AUTO: 89.3 FL (ref 81.4–97.8)
PATHOLOGY CONSULT NOTE: NORMAL
PLATELET # BLD AUTO: 212 K/UL (ref 164–446)
PMV BLD AUTO: 10.4 FL (ref 9–12.9)
POTASSIUM SERPL-SCNC: 4.3 MMOL/L (ref 3.6–5.5)
PROT SERPL-MCNC: 6 G/DL (ref 6–8.2)
RBC # BLD AUTO: 4.66 M/UL (ref 4.7–6.1)
SODIUM SERPL-SCNC: 136 MMOL/L (ref 135–145)
WBC # BLD AUTO: 20.7 K/UL (ref 4.8–10.8)

## 2025-07-09 PROCEDURE — 700101 HCHG RX REV CODE 250: Performed by: STUDENT IN AN ORGANIZED HEALTH CARE EDUCATION/TRAINING PROGRAM

## 2025-07-09 PROCEDURE — 94760 N-INVAS EAR/PLS OXIMETRY 1: CPT

## 2025-07-09 PROCEDURE — 0Y6J0Z1 DETACHMENT AT LEFT LOWER LEG, HIGH, OPEN APPROACH: ICD-10-PCS | Performed by: STUDENT IN AN ORGANIZED HEALTH CARE EDUCATION/TRAINING PROGRAM

## 2025-07-09 PROCEDURE — 88307 TISSUE EXAM BY PATHOLOGIST: CPT | Performed by: PATHOLOGY

## 2025-07-09 PROCEDURE — 700111 HCHG RX REV CODE 636 W/ 250 OVERRIDE (IP): Mod: JZ | Performed by: STUDENT IN AN ORGANIZED HEALTH CARE EDUCATION/TRAINING PROGRAM

## 2025-07-09 PROCEDURE — A9270 NON-COVERED ITEM OR SERVICE: HCPCS | Performed by: INTERNAL MEDICINE

## 2025-07-09 PROCEDURE — 160192 HCHG ANESTHESIA COMPLEX: Performed by: STUDENT IN AN ORGANIZED HEALTH CARE EDUCATION/TRAINING PROGRAM

## 2025-07-09 PROCEDURE — 160002 HCHG RECOVERY MINUTES (STAT): Performed by: STUDENT IN AN ORGANIZED HEALTH CARE EDUCATION/TRAINING PROGRAM

## 2025-07-09 PROCEDURE — 87077 CULTURE AEROBIC IDENTIFY: CPT

## 2025-07-09 PROCEDURE — 160048 HCHG OR STATISTICAL LEVEL 1-5: Performed by: STUDENT IN AN ORGANIZED HEALTH CARE EDUCATION/TRAINING PROGRAM

## 2025-07-09 PROCEDURE — 93922 UPR/L XTREMITY ART 2 LEVELS: CPT

## 2025-07-09 PROCEDURE — 87075 CULTR BACTERIA EXCEPT BLOOD: CPT

## 2025-07-09 PROCEDURE — A9270 NON-COVERED ITEM OR SERVICE: HCPCS | Performed by: STUDENT IN AN ORGANIZED HEALTH CARE EDUCATION/TRAINING PROGRAM

## 2025-07-09 PROCEDURE — 87205 SMEAR GRAM STAIN: CPT

## 2025-07-09 PROCEDURE — 160193 HCHG PACU STANDARD - 1ST 60 MINS: Performed by: STUDENT IN AN ORGANIZED HEALTH CARE EDUCATION/TRAINING PROGRAM

## 2025-07-09 PROCEDURE — 85027 COMPLETE CBC AUTOMATED: CPT

## 2025-07-09 PROCEDURE — 700105 HCHG RX REV CODE 258: Performed by: STUDENT IN AN ORGANIZED HEALTH CARE EDUCATION/TRAINING PROGRAM

## 2025-07-09 PROCEDURE — 700102 HCHG RX REV CODE 250 W/ 637 OVERRIDE(OP): Performed by: STUDENT IN AN ORGANIZED HEALTH CARE EDUCATION/TRAINING PROGRAM

## 2025-07-09 PROCEDURE — 700111 HCHG RX REV CODE 636 W/ 250 OVERRIDE (IP)

## 2025-07-09 PROCEDURE — 700102 HCHG RX REV CODE 250 W/ 637 OVERRIDE(OP): Performed by: INTERNAL MEDICINE

## 2025-07-09 PROCEDURE — 88311 DECALCIFY TISSUE: CPT | Mod: 26 | Performed by: PATHOLOGY

## 2025-07-09 PROCEDURE — 87102 FUNGUS ISOLATION CULTURE: CPT

## 2025-07-09 PROCEDURE — 88307 TISSUE EXAM BY PATHOLOGIST: CPT | Mod: 26 | Performed by: PATHOLOGY

## 2025-07-09 PROCEDURE — 87070 CULTURE OTHR SPECIMN AEROBIC: CPT

## 2025-07-09 PROCEDURE — 82962 GLUCOSE BLOOD TEST: CPT | Performed by: INTERNAL MEDICINE

## 2025-07-09 PROCEDURE — 80053 COMPREHEN METABOLIC PANEL: CPT

## 2025-07-09 PROCEDURE — 160039 HCHG SURGERY MINUTES - EA ADDL 1 MIN LEVEL 3: Performed by: STUDENT IN AN ORGANIZED HEALTH CARE EDUCATION/TRAINING PROGRAM

## 2025-07-09 PROCEDURE — 770020 HCHG ROOM/CARE - TELE (206)

## 2025-07-09 PROCEDURE — 99233 SBSQ HOSP IP/OBS HIGH 50: CPT | Performed by: INTERNAL MEDICINE

## 2025-07-09 PROCEDURE — 36415 COLL VENOUS BLD VENIPUNCTURE: CPT

## 2025-07-09 PROCEDURE — 160015 HCHG STAT PREOP MINUTES: Performed by: STUDENT IN AN ORGANIZED HEALTH CARE EDUCATION/TRAINING PROGRAM

## 2025-07-09 PROCEDURE — 88311 DECALCIFY TISSUE: CPT | Performed by: PATHOLOGY

## 2025-07-09 PROCEDURE — 93922 UPR/L XTREMITY ART 2 LEVELS: CPT | Mod: 26 | Performed by: INTERNAL MEDICINE

## 2025-07-09 PROCEDURE — 110371 HCHG SHELL REV 272: Performed by: STUDENT IN AN ORGANIZED HEALTH CARE EDUCATION/TRAINING PROGRAM

## 2025-07-09 PROCEDURE — 160028 HCHG SURGERY MINUTES - 1ST 30 MINS LEVEL 3: Performed by: STUDENT IN AN ORGANIZED HEALTH CARE EDUCATION/TRAINING PROGRAM

## 2025-07-09 PROCEDURE — 87076 CULTURE ANAEROBE IDENT EACH: CPT

## 2025-07-09 RX ORDER — ONDANSETRON 2 MG/ML
INJECTION INTRAMUSCULAR; INTRAVENOUS PRN
Status: DISCONTINUED | OUTPATIENT
Start: 2025-07-09 | End: 2025-07-09 | Stop reason: SURG

## 2025-07-09 RX ORDER — SODIUM CHLORIDE, SODIUM LACTATE, POTASSIUM CHLORIDE, CALCIUM CHLORIDE 600; 310; 30; 20 MG/100ML; MG/100ML; MG/100ML; MG/100ML
INJECTION, SOLUTION INTRAVENOUS
Status: DISCONTINUED | OUTPATIENT
Start: 2025-07-09 | End: 2025-07-09 | Stop reason: SURG

## 2025-07-09 RX ORDER — DEXAMETHASONE SODIUM PHOSPHATE 4 MG/ML
INJECTION, SOLUTION INTRA-ARTICULAR; INTRALESIONAL; INTRAMUSCULAR; INTRAVENOUS; SOFT TISSUE PRN
Status: DISCONTINUED | OUTPATIENT
Start: 2025-07-09 | End: 2025-07-09 | Stop reason: SURG

## 2025-07-09 RX ORDER — ONDANSETRON 2 MG/ML
4 INJECTION INTRAMUSCULAR; INTRAVENOUS
Status: DISCONTINUED | OUTPATIENT
Start: 2025-07-09 | End: 2025-07-09 | Stop reason: HOSPADM

## 2025-07-09 RX ORDER — CHLORPROMAZINE HYDROCHLORIDE 25 MG/1
25 TABLET, FILM COATED ORAL ONCE
Status: COMPLETED | OUTPATIENT
Start: 2025-07-09 | End: 2025-07-09

## 2025-07-09 RX ORDER — OXYCODONE HYDROCHLORIDE 5 MG/1
2.5 TABLET ORAL
Status: DISCONTINUED | OUTPATIENT
Start: 2025-07-09 | End: 2025-07-12

## 2025-07-09 RX ORDER — CEFAZOLIN SODIUM 2 G/100ML
INJECTION, SOLUTION INTRAVENOUS PRN
Status: DISCONTINUED | OUTPATIENT
Start: 2025-07-09 | End: 2025-07-09 | Stop reason: SURG

## 2025-07-09 RX ORDER — LABETALOL HYDROCHLORIDE 5 MG/ML
5 INJECTION, SOLUTION INTRAVENOUS
Status: DISCONTINUED | OUTPATIENT
Start: 2025-07-09 | End: 2025-07-09 | Stop reason: HOSPADM

## 2025-07-09 RX ORDER — SODIUM CHLORIDE, SODIUM LACTATE, POTASSIUM CHLORIDE, CALCIUM CHLORIDE 600; 310; 30; 20 MG/100ML; MG/100ML; MG/100ML; MG/100ML
INJECTION, SOLUTION INTRAVENOUS CONTINUOUS
Status: ACTIVE | OUTPATIENT
Start: 2025-07-09 | End: 2025-07-09

## 2025-07-09 RX ORDER — OXYCODONE HYDROCHLORIDE 5 MG/1
5 TABLET ORAL
Status: DISCONTINUED | OUTPATIENT
Start: 2025-07-09 | End: 2025-07-12

## 2025-07-09 RX ORDER — HYDROMORPHONE HYDROCHLORIDE 1 MG/ML
0.25 INJECTION, SOLUTION INTRAMUSCULAR; INTRAVENOUS; SUBCUTANEOUS
Status: DISCONTINUED | OUTPATIENT
Start: 2025-07-09 | End: 2025-07-12

## 2025-07-09 RX ORDER — EPHEDRINE SULFATE 50 MG/ML
5 INJECTION, SOLUTION INTRAVENOUS
Status: DISCONTINUED | OUTPATIENT
Start: 2025-07-09 | End: 2025-07-09 | Stop reason: HOSPADM

## 2025-07-09 RX ORDER — CHLORPROMAZINE HYDROCHLORIDE 25 MG/1
25 TABLET, FILM COATED ORAL 3 TIMES DAILY PRN
Status: DISCONTINUED | OUTPATIENT
Start: 2025-07-09 | End: 2025-07-10

## 2025-07-09 RX ORDER — DEXTROSE MONOHYDRATE 25 G/50ML
25 INJECTION, SOLUTION INTRAVENOUS
Status: DISCONTINUED | OUTPATIENT
Start: 2025-07-09 | End: 2025-07-09 | Stop reason: HOSPADM

## 2025-07-09 RX ORDER — LIDOCAINE HYDROCHLORIDE 20 MG/ML
INJECTION, SOLUTION EPIDURAL; INFILTRATION; INTRACAUDAL; PERINEURAL PRN
Status: DISCONTINUED | OUTPATIENT
Start: 2025-07-09 | End: 2025-07-09 | Stop reason: SURG

## 2025-07-09 RX ORDER — HALOPERIDOL 5 MG/ML
1 INJECTION INTRAMUSCULAR
Status: DISCONTINUED | OUTPATIENT
Start: 2025-07-09 | End: 2025-07-09 | Stop reason: HOSPADM

## 2025-07-09 RX ORDER — METOPROLOL TARTRATE 1 MG/ML
1 INJECTION, SOLUTION INTRAVENOUS
Status: DISCONTINUED | OUTPATIENT
Start: 2025-07-09 | End: 2025-07-09 | Stop reason: HOSPADM

## 2025-07-09 RX ORDER — HYDROMORPHONE HYDROCHLORIDE 1 MG/ML
0.4 INJECTION, SOLUTION INTRAMUSCULAR; INTRAVENOUS; SUBCUTANEOUS
Status: DISCONTINUED | OUTPATIENT
Start: 2025-07-09 | End: 2025-07-09 | Stop reason: HOSPADM

## 2025-07-09 RX ORDER — OXYCODONE HCL 5 MG/5 ML
10 SOLUTION, ORAL ORAL
Status: COMPLETED | OUTPATIENT
Start: 2025-07-09 | End: 2025-07-09

## 2025-07-09 RX ORDER — HYDROMORPHONE HYDROCHLORIDE 1 MG/ML
0.2 INJECTION, SOLUTION INTRAMUSCULAR; INTRAVENOUS; SUBCUTANEOUS
Status: DISCONTINUED | OUTPATIENT
Start: 2025-07-09 | End: 2025-07-09 | Stop reason: HOSPADM

## 2025-07-09 RX ORDER — INSULIN LISPRO 100 [IU]/ML
2-9 INJECTION, SOLUTION INTRAVENOUS; SUBCUTANEOUS EVERY 6 HOURS
Status: DISCONTINUED | OUTPATIENT
Start: 2025-07-09 | End: 2025-07-09 | Stop reason: HOSPADM

## 2025-07-09 RX ORDER — DIPHENHYDRAMINE HYDROCHLORIDE 50 MG/ML
12.5 INJECTION, SOLUTION INTRAMUSCULAR; INTRAVENOUS
Status: DISCONTINUED | OUTPATIENT
Start: 2025-07-09 | End: 2025-07-09 | Stop reason: HOSPADM

## 2025-07-09 RX ORDER — HYDROMORPHONE HYDROCHLORIDE 1 MG/ML
0.1 INJECTION, SOLUTION INTRAMUSCULAR; INTRAVENOUS; SUBCUTANEOUS
Status: DISCONTINUED | OUTPATIENT
Start: 2025-07-09 | End: 2025-07-09 | Stop reason: HOSPADM

## 2025-07-09 RX ORDER — OXYCODONE HCL 5 MG/5 ML
5 SOLUTION, ORAL ORAL
Status: COMPLETED | OUTPATIENT
Start: 2025-07-09 | End: 2025-07-09

## 2025-07-09 RX ADMIN — VANCOMYCIN HYDROCHLORIDE 1250 MG: 5 INJECTION, POWDER, LYOPHILIZED, FOR SOLUTION INTRAVENOUS at 07:00

## 2025-07-09 RX ADMIN — INSULIN LISPRO 3 UNITS: 100 INJECTION, SOLUTION INTRAVENOUS; SUBCUTANEOUS at 11:42

## 2025-07-09 RX ADMIN — ONDANSETRON 4 MG: 2 INJECTION INTRAMUSCULAR; INTRAVENOUS at 15:49

## 2025-07-09 RX ADMIN — CHLORPROMAZINE HYDROCHLORIDE 25 MG: 25 TABLET, FILM COATED ORAL at 12:44

## 2025-07-09 RX ADMIN — CEFAZOLIN SODIUM 2 G: 2 INJECTION, SOLUTION INTRAVENOUS at 15:49

## 2025-07-09 RX ADMIN — INSULIN LISPRO 3 UNITS: 100 INJECTION, SOLUTION INTRAVENOUS; SUBCUTANEOUS at 00:51

## 2025-07-09 RX ADMIN — INSULIN LISPRO 2 UNITS: 100 INJECTION, SOLUTION INTRAVENOUS; SUBCUTANEOUS at 18:24

## 2025-07-09 RX ADMIN — HYDROMORPHONE HYDROCHLORIDE 0.4 MG: 1 INJECTION, SOLUTION INTRAMUSCULAR; INTRAVENOUS; SUBCUTANEOUS at 19:10

## 2025-07-09 RX ADMIN — FENTANYL CITRATE 50 MCG: 50 INJECTION, SOLUTION INTRAMUSCULAR; INTRAVENOUS at 16:08

## 2025-07-09 RX ADMIN — CEFEPIME 2 G: 2 INJECTION, POWDER, FOR SOLUTION INTRAVENOUS at 06:22

## 2025-07-09 RX ADMIN — OXYCODONE 5 MG: 5 TABLET ORAL at 20:54

## 2025-07-09 RX ADMIN — FENTANYL CITRATE 100 MCG: 50 INJECTION, SOLUTION INTRAMUSCULAR; INTRAVENOUS at 15:49

## 2025-07-09 RX ADMIN — PROPOFOL 200 MG: 10 INJECTION, EMULSION INTRAVENOUS at 15:49

## 2025-07-09 RX ADMIN — HYDROMORPHONE HYDROCHLORIDE 0.4 MG: 1 INJECTION, SOLUTION INTRAMUSCULAR; INTRAVENOUS; SUBCUTANEOUS at 19:15

## 2025-07-09 RX ADMIN — THIAMINE HYDROCHLORIDE 100 MG: 100 INJECTION, SOLUTION INTRAMUSCULAR; INTRAVENOUS at 06:21

## 2025-07-09 RX ADMIN — KETAMINE HYDROCHLORIDE 25 MG: 50 INJECTION INTRAMUSCULAR; INTRAVENOUS at 17:50

## 2025-07-09 RX ADMIN — INSULIN LISPRO 3 UNITS: 100 INJECTION, SOLUTION INTRAVENOUS; SUBCUTANEOUS at 06:37

## 2025-07-09 RX ADMIN — HYDROMORPHONE HYDROCHLORIDE 0.2 MG: 1 INJECTION, SOLUTION INTRAMUSCULAR; INTRAVENOUS; SUBCUTANEOUS at 19:17

## 2025-07-09 RX ADMIN — OXYCODONE HYDROCHLORIDE 10 MG: 5 SOLUTION ORAL at 19:09

## 2025-07-09 RX ADMIN — SODIUM CHLORIDE, POTASSIUM CHLORIDE, SODIUM LACTATE AND CALCIUM CHLORIDE: 600; 310; 30; 20 INJECTION, SOLUTION INTRAVENOUS at 15:49

## 2025-07-09 RX ADMIN — DEXAMETHASONE SODIUM PHOSPHATE 4 MG: 4 INJECTION INTRA-ARTICULAR; INTRALESIONAL; INTRAMUSCULAR; INTRAVENOUS; SOFT TISSUE at 15:49

## 2025-07-09 RX ADMIN — FENTANYL CITRATE 50 MCG: 50 INJECTION, SOLUTION INTRAMUSCULAR; INTRAVENOUS at 16:19

## 2025-07-09 RX ADMIN — KETAMINE HYDROCHLORIDE 25 MG: 50 INJECTION INTRAMUSCULAR; INTRAVENOUS at 16:21

## 2025-07-09 RX ADMIN — CHLORPROMAZINE HYDROCHLORIDE 25 MG: 25 TABLET, FILM COATED ORAL at 07:58

## 2025-07-09 RX ADMIN — VANCOMYCIN HYDROCHLORIDE 1250 MG: 5 INJECTION, POWDER, LYOPHILIZED, FOR SOLUTION INTRAVENOUS at 20:58

## 2025-07-09 RX ADMIN — INSULIN LISPRO 4 UNITS: 100 INJECTION, SOLUTION INTRAVENOUS; SUBCUTANEOUS at 23:24

## 2025-07-09 RX ADMIN — LIDOCAINE HYDROCHLORIDE 80 MG: 20 INJECTION, SOLUTION EPIDURAL; INFILTRATION; INTRACAUDAL; PERINEURAL at 15:49

## 2025-07-09 RX ADMIN — OXYCODONE 5 MG: 5 TABLET ORAL at 08:31

## 2025-07-09 ASSESSMENT — PAIN DESCRIPTION - PAIN TYPE
TYPE: SURGICAL PAIN
TYPE: SURGICAL PAIN;ACUTE PAIN
TYPE: SURGICAL PAIN
TYPE: ACUTE PAIN
TYPE: SURGICAL PAIN;ACUTE PAIN
TYPE: ACUTE PAIN
TYPE: SURGICAL PAIN;ACUTE PAIN
TYPE: SURGICAL PAIN
TYPE: SURGICAL PAIN;ACUTE PAIN
TYPE: ACUTE PAIN
TYPE: SURGICAL PAIN

## 2025-07-09 ASSESSMENT — COGNITIVE AND FUNCTIONAL STATUS - GENERAL
MOVING TO AND FROM BED TO CHAIR: A LOT
SUGGESTED CMS G CODE MODIFIER DAILY ACTIVITY: CK
STANDING UP FROM CHAIR USING ARMS: A LOT
SUGGESTED CMS G CODE MODIFIER MOBILITY: CL
MOBILITY SCORE: 11
WALKING IN HOSPITAL ROOM: TOTAL
DAILY ACTIVITIY SCORE: 18
DRESSING REGULAR LOWER BODY CLOTHING: A LITTLE
TURNING FROM BACK TO SIDE WHILE IN FLAT BAD: A LITTLE
MOVING FROM LYING ON BACK TO SITTING ON SIDE OF FLAT BED: A LOT
CLIMB 3 TO 5 STEPS WITH RAILING: TOTAL
HELP NEEDED FOR BATHING: A LOT
DRESSING REGULAR UPPER BODY CLOTHING: A LITTLE
TOILETING: A LOT

## 2025-07-09 ASSESSMENT — ENCOUNTER SYMPTOMS
PHOTOPHOBIA: 0
DIARRHEA: 0
HEADACHES: 0
SPEECH CHANGE: 0
COUGH: 0
WEAKNESS: 0
SENSORY CHANGE: 0
INSOMNIA: 0
HEARTBURN: 0
DEPRESSION: 0
CLAUDICATION: 0
NERVOUS/ANXIOUS: 0
MYALGIAS: 0
DIZZINESS: 0
SHORTNESS OF BREATH: 0
FEVER: 0
ABDOMINAL PAIN: 0
CONSTIPATION: 0
VOMITING: 0
BLURRED VISION: 0
CHILLS: 0

## 2025-07-09 NOTE — CARE PLAN
The patient is Watcher - Medium risk of patient condition declining or worsening    Shift Goals  Clinical Goals: iv abx therapy, patient safety  Patient Goals: less hiccups  Family Goals: update plan of care    Progress made toward(s) clinical / shift goals:    Problem: Fall Risk  Goal: Patient will remain free from falls  Outcome: Progressing       Patient is not progressing towards the following goals:      Problem: Knowledge Deficit - Standard  Goal: Patient and family/care givers will demonstrate understanding of plan of care, disease process/condition, diagnostic tests and medications  Outcome: Not Progressing     Problem: Skin Integrity  Goal: Skin integrity is maintained or improved  Outcome: Not Progressing

## 2025-07-09 NOTE — ASSESSMENT & PLAN NOTE
Patient currently does not have decision making capacity, daughter is NOK for consents  Capacity eval done by psychiatry

## 2025-07-09 NOTE — ANESTHESIA PROCEDURE NOTES
Airway    Date/Time: 7/9/2025 4:08 PM    Performed by: Dian Lopes M.D.  Authorized by: Dian Lopes M.D.    Location:  OR  Urgency:  Elective  Indications for Airway Management:  Anesthesia      Spontaneous Ventilation: absent    Sedation Level:  Deep  Preoxygenated: Yes    Mask Difficulty Assessment:  1 - vent by mask  Final Airway Type:  Supraglottic airway  Final Supraglottic Airway:  Standard LMA    SGA Size:  4  Number of Attempts at Approach:  1  Number of Other Approaches Attempted:  0

## 2025-07-09 NOTE — OR NURSING
Patient arrived in Pre-op. Confirmed name, , and surgery to be performed. Verified allergies, medications, and preferred pharmacy. Patient is A&OX4. IV from floor not working so it was removed. Anesthesiologist at bedside attempting to get a new one because Sujit RN was unable to get one.
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
## 2025-07-09 NOTE — THERAPY
Occupational Therapy Contact Note    Patient Name: Jimmy Amaya  Age:  70 y.o., Sex:  male  Medical Record #: [id number]  Today's Date: 7/9/2025 07/09/25 1232   Initial Contact Note    Initial Contact Note Order Received and Verified, Occupational Therapy Evaluation in Progress with Full Report to Follow.   Interdisciplinary Plan of Care Collaboration   IDT Collaboration with  Nursing   Collaboration Comments OT orders rec'd.  RN recommended HOLD therapy eval as pt pending surgery for foot wound.  Will follow and see when medically appropriate.

## 2025-07-09 NOTE — CARE PLAN
The patient is Stable - Low risk of patient condition declining or worsening    Shift Goals  Clinical Goals: MRI results, Sx, no s/s of sepsis  Patient Goals: Comfort, Sx  Family Goals: Updates    Progress made toward(s) clinical / shift goals:  Thorazine added for pt hiccups which relieved hiccups for ~4 hours. PRN Rx added and given for LLE pain with good relief. FSBG covered with SSI. POC discussed extensively with pt's daughter and sister. ALEJANDRO.       Problem: Pain - Standard  Goal: Alleviation of pain or a reduction in pain to the patient’s comfort goal  Outcome: Progressing       Patient is not progressing towards the following goals:      Problem: Knowledge Deficit - Standard  Goal: Patient and family/care givers will demonstrate understanding of plan of care, disease process/condition, diagnostic tests and medications  Outcome: Progressing     Problem: Skin Integrity  Goal: Skin integrity is maintained or improved  Outcome: Progressing     Problem: Fall Risk  Goal: Patient will remain free from falls  Outcome: Progressing
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
## 2025-07-09 NOTE — PROGRESS NOTES
4 Eyes Skin Assessment Completed by DEBRA Hamlin and DEBRA Iverson.    Skin assessment is primarily focused on high risk bony prominences. Pay special attention to skin beneath and around medical devices, high risk bony prominences, skin to skin areas and areas where the patient lacks sensation to feel pain and areas where the patient previously had breakdown.     Head (Occipital):  Laceration and Abrasion     Ears (Under Medical Devices): WDL   Nose (Under Medical Devices): WDL   Mouth:  WDL   Neck: WDL   Breast/Chest:  WDL   Shoulder Blades:  WDL   Spine:   Abrasion     (R) Arm/Elbow/Hand: Abrasion, Pink, and Blanching     (L) Arm/Elbow/Hand: Pink, Red, Blanching, and Boggy     Abdomen: Pink, Red, and Boggy   Pannus/Groin:  Pink   Sacrum/Coccyx:   Pink and Blanching   (R) Ischial Tuberosity (Sit Bones):  Pink and Discolored   (L) Ischial Tuberosity (Sit Bones):  Pink and Discolored   (R) Leg:  WDL   (L) Leg:  WDL   (R) Heel:  Pink, Red, Blanching, Boggy, and Discolored   (R) Foot/Toe: Pink, Red, and Callused   (L) Heel: Pink, Red, Blanching, Boggy, and Discolored     (L) Foot/Toe:  Open wound, Ulcer of lower extremity or foot, Pink, Red, Purple/maroon, Brown, Black, Non-Blanching, Denuded (red, raw, burnt), Edema, Weeping, and maggots to open wound               DEVICES IN USE:   Respiratory Devices:  NA, patient on room air  Feeding Devices:  N/A   Lines & BP Monitoring Devices:  Peripheral IV and BP cuff    Orthopedic Devices:  N/A  Miscellaneous Devices:  Telemetry monitor    PROTOCOL INTERVENTIONS:   Low Airloss Bed:  Applied this assessment    WOUND PHOTOS:   Completed and in EPIC     WOUND CONSULT:   Consult to be ordered for the following areas LLE
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
## 2025-07-09 NOTE — CONSULTS
"Orthopaedic Surgery Consult Note:    Jacques Youngblood M.D.  Date & Time note created:    7/9/2025   2:31 PM     Referring MD:  Dr. Jean    Patient ID:   Name:             Jimmy Amaya   YOB: 1955  Age:                 70 y.o.  male   MRN:               [id number]                                                             Reason for Consult:      Left foot infection with osteomyelitis    History of Present Illness:    70M presents as transfer from [place] for evaluation of left foot infection.  Patient notes a several week history of worsening swelling and drainage from left foot wound.  Found to have several open wounds with maggots present in the wound base.  MRI demonstrates osteomyelitis of the 4th and 5th metatarsals.  Patient has a history of uncontrolled DM, bilateral lower extremity neuropathy.  Lives in [place] by himself.  Has had several occurrences of left foot infections over the past few years, underwent I&D in 2023.  Patient with altered mental status over the past week, was evaluated by psych yesterday and found to be not competent for making his own medical decisions.      Review of Systems:      See HPI            Past Medical History:   Past Medical History[1]  Active Hospital Problems    Diagnosis     Altered mental state [R41.82]     Sepsis (HCC) [A41.9]     Self neglect [R46.89]     Acute hypoxic respiratory failure (HCC) [J96.01]     Diabetic foot infection (HCC) [E11.628, L08.9]     Type 2 diabetes mellitus with diabetic polyneuropathy (HCC) [E11.42]        Past Surgical History:  Past Surgical History[2]    Current Outpatient Medications:  Prescriptions Prior to Admission[3]    Medication Allergy:  Allergies[4]    Physical Exam:  Vitals/ General Appearance:   Weight/BMI: Body mass index is 31.14 kg/m².  /72   Pulse 82   Temp 36.2 °C (97.1 °F) (Temporal)   Resp 18   Ht 1.88 m (6' 2\")   Wt 110 kg (242 lb 8.1 oz)   SpO2 95%   Vitals:    07/09/25 0427 07/09/25 0734 " 07/09/25 0831 07/09/25 1239   BP: 117/57 (!) 147/69  133/72   Pulse: 84 83  82   Resp: 18 20 20 18   Temp: 36.2 °C (97.2 °F) 36.7 °C (98 °F)  36.2 °C (97.1 °F)   TempSrc: Temporal Temporal  Temporal   SpO2: 91% 94%  95%   Weight:       Height:           Constitutional:   alert, oriented, no acute distress  Cardiovascular:  regular rate, systemically well perfused  Lungs: no increased work of breathing  Neurologic: no focal deficits noted  Psychiatric: affect normal  Musculoskeletal:   Eschar over the lateral forefoot overlying the dorsum of the 4th and 5th metatarsals.  Open wound over the lateral aspect of the 5th metatarsal head with maggots present in the wound.  Surrounding erythema and edema throughout the lateral mid and forefoot.  Sensation to light touch absent throughout much of the foot.  Foot well perfused with good capillary refill    Lab Data Review:  Recent Results (from the past 24 hours)   POCT glucose device results    Collection Time: 07/08/25  5:30 PM   Result Value Ref Range    POC Glucose, Blood 188 (H) 65 - 99 mg/dL   POCT glucose device results    Collection Time: 07/08/25  8:09 PM   Result Value Ref Range    POC Glucose, Blood 221 (H) 65 - 99 mg/dL   POCT glucose device results    Collection Time: 07/09/25 12:45 AM   Result Value Ref Range    POC Glucose, Blood 197 (H) 65 - 99 mg/dL   CBC WITHOUT DIFFERENTIAL    Collection Time: 07/09/25  3:38 AM   Result Value Ref Range    WBC 20.7 (H) 4.8 - 10.8 K/uL    RBC 4.66 (L) 4.70 - 6.10 M/uL    Hemoglobin 12.9 (L) 14.0 - 18.0 g/dL    Hematocrit 41.6 (L) 42.0 - 52.0 %    MCV 89.3 81.4 - 97.8 fL    MCH 27.7 27.0 - 33.0 pg    MCHC 31.0 (L) 32.3 - 36.5 g/dL    RDW 45.8 35.9 - 50.0 fL    Platelet Count 212 164 - 446 K/uL    MPV 10.4 9.0 - 12.9 fL   Comp Metabolic Panel    Collection Time: 07/09/25  3:38 AM   Result Value Ref Range    Sodium 136 135 - 145 mmol/L    Potassium 4.3 3.6 - 5.5 mmol/L    Chloride 104 96 - 112 mmol/L    Co2 19 (L) 20 - 33 mmol/L     Anion Gap 13.0 7.0 - 16.0    Glucose 173 (H) 65 - 99 mg/dL    Bun 23 (H) 8 - 22 mg/dL    Creatinine 0.65 0.50 - 1.40 mg/dL    Calcium 8.4 (L) 8.5 - 10.5 mg/dL    Correct Calcium 9.5 8.5 - 10.5 mg/dL    AST(SGOT) 32 12 - 45 U/L    ALT(SGPT) 20 2 - 50 U/L    Alkaline Phosphatase 113 (H) 30 - 99 U/L    Total Bilirubin 1.1 0.1 - 1.5 mg/dL    Albumin 2.6 (L) 3.2 - 4.9 g/dL    Total Protein 6.0 6.0 - 8.2 g/dL    Globulin 3.4 1.9 - 3.5 g/dL    A-G Ratio 0.8 g/dL   ESTIMATED GFR    Collection Time: 07/09/25  3:38 AM   Result Value Ref Range    GFR (CKD-EPI) 102 >60 mL/min/1.73 m 2   POCT glucose device results    Collection Time: 07/09/25  6:32 AM   Result Value Ref Range    POC Glucose, Blood 189 (H) 65 - 99 mg/dL   POCT glucose device results    Collection Time: 07/09/25 11:39 AM   Result Value Ref Range    POC Glucose, Blood 176 (H) 65 - 99 mg/dL       Imaging:   US-ONUR SINGLE LEVEL BILAT         MR-FOOT-WITH & W/O LEFT   Final Result      1.  There is a large area of necrotic tissue/abscess in the lateral aspect of the forefoot.   2.  Osteomyelitis with necrosis versus intraosseous gas of the fifth metatarsal and the proximal and middle phalanges.   3.  There is also abnormal signal within the fourth metatarsal and proximal phalanx which may be reactive although additional infection cannot be excluded.   4.  There is diffuse soft tissue edema and enhancement which likely represents cellulitis and myositis.      DX-CHEST-PORTABLE (1 VIEW)   Final Result      Elevation of the right hemidiaphragm. Patchy right basilar opacities, atelectasis or infection.          Assessment/Plan: 70M w chronic soft tissue infection of the left lateral foot with osteomyelitis of the 4th and 5th metatarsals, maggots present in the wound.  Discussed findings and potential treatment plans both with patient's sister and daughter (MPOA).  Given extent of infection and lack of protective sensation, low likelihood for success with partial  midfoot amputation.  Recommend BKA as definitive option.  Reviewed risks and expected postop plan.  Patient's daughter agrees to proceed with surgery.      To OR today for Left BKA  NPO  Abx per primary  Please hold chemoprophylaxis        Miguel Youngblood MD  [facility]        Jacques Youngblood M.D.  [facility]         [1]   Past Medical History:  Diagnosis Date    Diabetes (HCC)     Self neglect 07/08/2025   [2]   Past Surgical History:  Procedure Laterality Date    ORIF, FINGER Right 12/7/2023    Procedure: RIGHT SMALL FINGER OPEN REDUCTION INTERNAL FIXATION, OPEN FRACTURE DEBRIDEMENT, EXTENSOR TENDON REPAIR;  Surgeon: Anabelle Dallas M.D.;  Location: SURGERY SAME DAY [facility];  Service: Orthopedics    WOUND IRRIGATION & DEBRIDEMENT Right 12/7/2023    Procedure: IRRIGATION AND DEBRIDEMENT, WOUND;  Surgeon: Anabelle Dallas M.D.;  Location: SURGERY SAME DAY [facility];  Service: Orthopedics    EXTENSOR TENDON REPAIR Right 12/7/2023    Procedure: REPAIR, TENDON, EXTENSOR;  Surgeon: Anabelle Dallas M.D.;  Location: SURGERY SAME DAY [facility];  Service: Orthopedics    INCISION AND DRAINAGE ORTHOPEDIC Left 1/5/2023    Procedure: INCISION AND DRAINAGE, WOUND, BY ORTHOPEDICS - FOOT;  Surgeon: Giancarlo García M.D.;  Location: SURGERY [facility];  Service: Orthopedics    DENTAL EXTRACTION(S)     [3]   No medications prior to admission.   [4] No Known Allergies

## 2025-07-09 NOTE — PROGRESS NOTES
Telemetry Shift Summary     Per Monitor Tech    Rhythm: SR  HR: 78-95  Ectopy: rofPVC/rPAC/rfTrig/rCouplet/rTriplet    Measurements: .19/.07/.40

## 2025-07-09 NOTE — PROGRESS NOTES
Telemetry Shift Summary     Per monitor tech:  Rhythm SR  HR Range 78-95  Ectopy r-oPVC, rPAC/Coup/Big/Trig  Measurements 0.18/0.08/0.34      Normal Values  Rhythm SR  HR Range:   Measurements: 0.12-0.20/0.06-0.10/0.30-0.52

## 2025-07-09 NOTE — ANESTHESIA PREPROCEDURE EVALUATION
Case: [id number] Date/Time: 07/09/25 1450    Procedure: AMPUTATION, BELOW KNEE (Left)    Location:  OR 06 / SURGERY [facility]    Surgeons: Jacques Youngblood M.D.            Relevant Problems   ANESTHESIA (within normal limits)      PULMONARY (within normal limits)      NEURO (within normal limits)      CARDIAC (within normal limits)      GI (within normal limits)       (within normal limits)      ENDO   (positive) Type 2 diabetes mellitus with circulatory disorder, without long-term current use of insulin (HCC)   (positive) Type 2 diabetes mellitus with diabetic polyneuropathy (HCC)      OB (within normal limits)      Other   (positive) Absent pedal pulses   (positive) Acute hypoxic respiratory failure (HCC)   (positive) Altered mental state   (positive) Diabetic ulcer of left midfoot associated with type 2 diabetes mellitus (HCC)   (positive) Displaced fracture of proximal phalanx of right little finger, initial encounter for open fracture   (positive) Elevated alkaline phosphatase level   (positive) Elevated blood pressure reading in office without diagnosis of hypertension   (positive) Foot ulcer, left, with unspecified severity (HCC)   (positive) Hypoalbuminemia   (positive) Hypokalemia   (positive) Hyponatremia   (positive) Laceration of extensor muscle, fascia and tendon of left little finger at wrist and hand level, initial encounter   (positive) Left diabetic foot ulcer and infection (Resolved)   (positive) Obesity (BMI 30-39.9)   (positive) Self neglect   (positive) Sepsis (HCC)   (positive) Sepsis due to methicillin resistant Staphylococcus aureus (MRSA) without acute organ dysfunction (HCC)   (positive) Strain of left wrist (Resolved)       Physical Exam    Airway   Mallampati: III  TM distance: >3 FB  Neck ROM: limited       Cardiovascular - normal exam  Rhythm: regular  Rate: normal    (-) murmur     Dental - normal exam      Very poor dentition     Pulmonary - normal examBreath sounds clear to  auscultation     Abdominal    Neurological - normal exam                   Anesthesia Plan    ASA 3- EMERGENT   ASA physical status 3 criteria: diabetes - poorly controlledASA physical status emergent criteria: sepsis and compromised vital organ, limb or tissue    Plan - general       Airway plan will be LMA        Plan Factors:   Patient was previously instructed to abstain from smoking on day of procedure.  Patient did not smoke on day of procedure.      Induction: intravenous    Postoperative Plan: Postoperative administration of opioids is intended.    Pertinent diagnostic labs and testing reviewed    Informed Consent:    Anesthetic plan and risks discussed with patient.    Use of blood products discussed with: patient whom consented to blood products.         Pt w uncontrolled DMII, elevated WBC and sepsis found to have osteomyelitis of L foot presenting to pre op for amputation w ortho.  No history of CAD, MI, COPD, smoking, IVDU, PEDRO, CVA.  Spoke to Gabby, daughter who is medical decision maker and sister at bedside confirmed history. LACY Lopes MD

## 2025-07-09 NOTE — PROGRESS NOTES
12-hour chart check complete.    Monitor Summary  Rhythm: SR  Rate: 80's  Ectopy: occ pvc's, rare coup, trig, pac, big  Measurements: .17/.08/.34

## 2025-07-09 NOTE — CONSULTS
"PSYCHIATRIC CONSULTATION:  *Reason for admission:   Left foot infection  *Reason for consult:capacity   *Requesting Physician: Dr. Jolly      Legal status:  not applicable    *Chief Complaint:\" I was just stupid\"    *HPI:     Mr. Amaya is a 70-year-old male seen today for an initial psychiatric evaluation of capacity.  The patient was admitted on 7/7/2025 from an outside hospital where he was noted to have infection in his left foot.  Patient does have a history of diabetes for which he is not compliant with care.  Left foot was found with maggots present concern for bedbugs.  Outside hospital x-ray also showed possible osteomyelitis.  Patient was noted to be hyperglycemic and tachycardic.  Mild hyponatremia was noted.  This did improve with treatment of hyperglycemia.  Patient was started on vancomycin and Zosyn.  Family requested a capacity evaluation.    Patient is seen lying in bed with his sister at bedside.  He consented to his sister being present during the duration of the interview.  I asked the patient why he is in the hospital and he proceeded to point at his foot.  I asked for more specifics, asking what is wrong with his foot.  He states that his foot is hurting.  I asked why his foot is hurting and he says because his diabetes is bad.  I asked if there is any other reason why his foot is hurting and he just keeps mentioning his diabetes.  His sister redirected him and said\" yes she knows about the diabetes but she wants to know what the diabetes did to your foot, is it an infection?\"  Patient then assented that he has an infection.  He mentions that they want to cut off his foot.  I asked why they want to cut off his foot and he says\" because of what the diabetes did.\"  He did not seem to be able to make the connection between having an infection in his bones as the reason for needing an amputation.  I asked what kinds of treatment they are giving him to treat his foot right now and he says\" " "insulin.\"  He does not seem to be aware of the fact that he is on antibiotics.  He says that the insulin will treat the diabetes and therefore help his foot.  I asked him what would happen if he refuses the amputation and he says\" I guess my foot will not get better.\"  He comments that he only noted the infection about 3 days ago but his sister corrects him and said\" no, you have had this infection since about December! \" I asked what other consequences he could face if he refuses an amputation with a serious infection in his foot and he is unable to tell me saying\" I am not a doctor.\"  His sister proceeded to confront him on the fact that many doctors have told him he has an infection and that he needs to get his diabetes under better control.  He states despite many doctors telling him this information he has not been able to follow-up with the recommendations.  I asked the patient why he is not able to follow these recommendations and he says\" because I am just stupid.\"  I tried to ask if there were any financial barriers or difficulty accessing care that prevented him from seeking attention for his foot earlier.  He comments that his insulin cost $700 but his sister shakes her head and says that this is not the case.  The patient becomes defensive when he sees his sisters reaction but does admit that he does not think that his diabetes was really that big of a problem and so he did not take it seriously enough.  He is unable to give me any other clear explanation as to why he never continued treatment for the infection in his foot, he only repeats\" I guess is because I am stupid.\"  I did ask patient whether he has been feeling more depressed lately as his sister comments that not being able to work has isolated the patient and made him feel bad about himself.  However, the patient states he does not feel depressed.  He does however admit he feels overwhelmed by his medical problems because he was healthy most of " "his life so he is not sure how to take care of his health now that he has a serious medical condition.  He has insight that it probably would be beneficial for his family members to help him with decision-making as he feels very overwhelmed when doctors are discussing treatment with him.  He comments that he does trust his sister and that he does have a relationship with his daughter.  He does deny any thoughts of harming self or others.  He states he does want to live, stating his daughter and grandson is reasons to continue living.  He also states he has a dog that he cares about deeply.  He denies having any hallucinations or delusions and his sister at bedside, and that the patient typically does not have hallucinations and does not typically mention any paranoid or bizarre delusional thoughts.  Patient sister also brought up that the patient is unable to keep his apartment clean.  She comments that he has had difficulty with cleanliness throughout his life but that it has gotten worse lately.  Patient just states\" I guess I am lazy\" does not explanation as to why he is unable to keep his apartment clean.    Patient continues to be agreeable to medical treatment right now, does not desire to leave the hospital.    *Psychiatric Review of Systems:    ROS:  Mood:\" Are not depressed, just overwhelmed\"  Sleep: Denies problems  Appetite: Patient reports in the last week he has not been feeling good and so he has not been eating much.  He states he is very hungry now  Energy: Low  SI/HI: Denies  Hallucinations: Denies  Bipolar: No symptoms of mary ann or hypomania  Trauma: Does not endorse nightmares or flashbacks    *Medical Review of Systems: as reported by pt. All systems reviewed. Only those found to be + are noted below. All others are negative.   Only physical complaint today is left foot discomfort    *Past Medical Hx:   Problem List[1]     *Family Medical/Psychiatric Hx:  No known family psychiatric " "history    *Past Psychiatric Hx: No prior mental health diagnosis  IP treatment: None  OP treatment: None   Meds tried: None   Suicide attempts: None   Trauma: None    *Social Hx:  Living situation: Patient is living in his own RV in [place]  Childhood: Patient was born and raised in [place].  He has 3 brothers and 1 sister.  Patient was raised by his biological parents  Education/Employment (hx of IDD?): Patient has a degree in civil engineering and used to work as a  until he turns 63 and the company he was working for was sold.  He did try to return to work at the age of 69 when he was living with his daughter but there were conflicts with that job and he left after less than a year.  He was working at [employer] up until a few weeks ago  Relationships (estrangements?): Patient is , he has 1 daughter and a grandson  Legal Hx: None    *Drug/Alcohol/Tobacco Hx:   Patient denies any history or current use of alcohol, tobacco or illicit drugs      *Psychiatric (Physical) Examination: observed phenomenon:  Vitals:    07/08/25 1600   BP: 133/65   Pulse: 85   Resp: (!) 24   Temp: 36.6 °C (97.8 °F)   SpO2: 90%     General: Awake, alert in no acute distress  Patient Appearance: Appropriate, fairly groomed, wearing hospital gown  Behavior: Appropriate Behavior, Calm, Cooperative  Speech.: Spontaneous, Normal rate and rhythm, Answers appropriately, normal  volume  Mood Comments:\" Overwhelmed\"  Affect: appears mood congruent, full and appropriately reactive  Thought Content: Appropriate, No suicidal ideation, No thoughts of self harm,  No homicidal ideation, Contracts for safety, Feels life is worth living  Thought Process: Logical and goal directed, No loosening of associations  Psychosis: No delusions, No hallucinations  Insight: Impaired insight, possibly secondary to cognitive impairments  Judgment: Impaired  Cognition: Deficits in recent memory noted (i.e. patient thought he had a foot infection for just 3 " days and is sister detailed that he is actually had it since December and has not been compliant with care)  Concentration is intact to conversation, fully oriented to person, place, time (month and year) and circumstance.  Language: Is able to repeat phrases. and Is able to name objects.  Fund of Knowledge: Below expected for age and level of education  Neuro: no tics, tremors, dyskinesias. no dystonias. Gait not observed    Mobile Cognitive Assessment (MOCA)   conducted 2025  VISUOSPACIAL / EXECUTIVE   Clock Drawin out of 3  Trails drawin out of 1  Cube Drawin out of 1    NAMING  Naming: 3 out of 3    MEMORY  Memory: No points    ATTENTION  Digits: 1 out of 2  Letters: 1 out of 1  Subtraction: 2 out of 3    LANGUAGE  Repeat Phrases: 2 out of 2  Fluency: 0 out of 1 (only able to name 3 words that begin with F after 1 minute)    ABSTRACTION  Abstraction: 1 out of 2    DELAYED RECALL  Recall words: 2 out of 5  Category Cue (if applicable):    Multiple Choice Cue (if applicable):     ORIENTATION  Orientation: 5 out of 6    Add 1 point if less than or equal to 12 yr education level:     MOCA TOTAL SCORE:     Biomechanical/Visual Limitations (if applicable):          Allergies:   Allergies[2]     Medications (currently prescribed at [facility]  Current Medications[3]    Labs:  Lab Results   Component Value Date/Time    SODIUM 133 (L) 2025 03:25 AM    POTASSIUM 4.8 2025 03:25 AM    CHLORIDE 98 2025 03:25 AM    CO2 23 2025 03:25 AM    GLUCOSE 253 (H) 2025 03:25 AM    BUN 27 (H) 2025 03:25 AM    CREATININE 0.82 2025 03:25 AM      Recent Labs     25  2243 25  0325   ASTSGOT 28 35   ALTSGPT 22 24   TBILIRUBIN 1.9* 1.5   GLOBULIN 3.6* 3.6*     Lab Results   Component Value Date/Time    WBC 20.3 (H) 2025 03:25 AM    RBC 4.37 (L) 2025 03:25 AM    HEMOGLOBIN 12.2 (L) 2025 03:25 AM    HEMATOCRIT 36.6 (L) 2025 03:25 AM    MCV 83.8  07/08/2025 03:25 AM    MCH 27.9 07/08/2025 03:25 AM    MCHC 33.3 07/08/2025 03:25 AM    MPV 12.2 07/08/2025 03:25 AM    NEUTSPOLYS 80.50 (H) 07/07/2025 10:43 PM    LYMPHOCYTES 6.80 (L) 07/07/2025 10:43 PM    MONOCYTES 12.70 07/07/2025 10:43 PM    EOSINOPHILS 0.00 07/07/2025 10:43 PM    BASOPHILS 0.00 07/07/2025 10:43 PM          *ASSESSMENT:   Mr. Amaya is a 70-year-old male seen today for an initial psychiatric evaluation of capacity.  The patient does appear to have impaired capacity.  He is unable to clearly explain to me the reasons for his hospitalization and does not know the rationale for proposed treatments.  He does appear to have short-term memory deficits as is evidenced by the fact that he is unable to give me an accurate history regarding the course of his infection.  Victor cognitive assessment done at bedside today also does indicate cognitive dysfunction.  This could explain difficulty patient is having complying with treatment and caring for self. Patient does admit that he is very overwhelmed by discussions regarding his medical treatment.  He admits that it probably would be beneficial for his family to be involved in decision making.    Thus, I do not believe that the patient has capacity to make medical decisions based off my assessment today.  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 behavioral disturbances    *Plan/Further Workup:   Legal status: not applicable      Signing off, please reconsult with any further behavioral health questions or concerns                     [1]   Patient Active Problem List  Diagnosis    Type 2 diabetes mellitus with diabetic polyneuropathy (HCC)    Obesity (BMI 30-39.9)    Elevated blood pressure reading in office without diagnosis of hypertension    Absent pedal pulses    Foot ulcer, left, with  unspecified severity (HCC)    Sepsis due to methicillin resistant Staphylococcus aureus (MRSA) without acute organ dysfunction (HCC)    Type 2 diabetes mellitus with circulatory disorder, without long-term current use of insulin (HCC)    Hyponatremia    Hypokalemia    Elevated alkaline phosphatase level    Hypoalbuminemia    Diabetic ulcer of left midfoot associated with type 2 diabetes mellitus (HCC)    Laceration of extensor muscle, fascia and tendon of left little finger at wrist and hand level, initial encounter    Displaced fracture of proximal phalanx of right little finger, initial encounter for open fracture    Diabetic foot infection (HCC)    Altered mental state    Sepsis (HCC)    Self neglect    Acute hypoxic respiratory failure (HCC)   [2] No Known Allergies  [3]   Current Facility-Administered Medications:     insulin GLARGINE (Lantus,Semglee) injection, 15 Units, Subcutaneous, Q EVENING, Johan Hearn D.O., 15 Units at 07/08/25 1735    vancomycin 1250 mg/250mL NS IVPB premix, 1,250 mg, Intravenous, Q12HR, Johan Hearn D.O., Last Rate: 125 mL/hr at 07/08/25 1823, 1,250 mg at 07/08/25 1823    thiamine (B-1) injection 100 mg, 100 mg, Intravenous, DAILY, Johan Hearn D.O., 100 mg at 07/08/25 0519    insulin lispro (HumaLOG,AdmeLOG) subcutaneous injection, 3-14 Units, Subcutaneous, Q6HRS, 3 Units at 07/08/25 1735 **AND** POC blood glucose manual result, , , Q6H **AND** NOTIFY MD and PharmD, , , Once **AND** Administer 20 grams of glucose (approximately 8 ounces of fruit juice) every 15 minutes PRN FSBG less than 70 mg/dL, , , PRN **AND** dextrose 50 % (D50W) injection 25 g, 25 g, Intravenous, Q15 MIN PRN, Jesus Jolly D.O.    ondansetron (Zofran) syringe/vial injection 4 mg, 4 mg, Intravenous, Q4HRS PRN, Renato Patton M.D., 4 mg at 07/08/25 2014    [Held by provider] heparin injection 5,000 Units, 5,000 Units, Subcutaneous, Q8HRS, Jesus Jolly D.O., 5,000 Units at  07/08/25 0054    MD Alert...Vancomycin per Pharmacy, , Other, PHARMACY TO DOSE, Johan Hearn D.O.    cefepime (Maxipime) 2 g in  mL IVPB, 2 g, Intravenous, Q12HRS, Johan Hearn D.O., Stopped at 07/08/25 1805

## 2025-07-09 NOTE — THERAPY
Physical Therapy Contact Note    Patient Name: Jimmy Amaya  Age:  70 y.o., Sex:  male  Medical Record #: [id number]  Today's Date: 7/9/2025 07/09/25 1051   Initial Contact Note    Initial Contact Note Order Received and Verified, Physical Therapy Evaluation in Progress with Full Report to Follow.   Interdisciplinary Plan of Care Collaboration   Collaboration Comments Pt pending surgery. PT order due to low 6 clicks   acknowledged. PT to hold and  await post op orders.   Session Information   Date / Session Number  7/9 ( hold Sx )

## 2025-07-09 NOTE — CARE PLAN
The patient is Stable - Low risk of patient condition declining or worsening    Shift Goals  Clinical Goals: MRI, ortho consult  Patient Goals: MRI, ortho consult  Family Goals: Updates    Progress made toward(s) clinical / shift goals:    Problem: Knowledge Deficit - Standard  Goal: Patient and family/care givers will demonstrate understanding of plan of care, disease process/condition, diagnostic tests and medications  Outcome: Progressing     Problem: Fall Risk  Goal: Patient will remain free from falls  Outcome: Progressing       Patient is not progressing towards the following goals:

## 2025-07-10 LAB
ALBUMIN SERPL BCP-MCNC: 2.3 G/DL (ref 3.2–4.9)
ALBUMIN/GLOB SERPL: 0.6 G/DL
ALP SERPL-CCNC: 119 U/L (ref 30–99)
ALT SERPL-CCNC: 26 U/L (ref 2–50)
ANION GAP SERPL CALC-SCNC: 11 MMOL/L (ref 7–16)
AST SERPL-CCNC: 34 U/L (ref 12–45)
BILIRUB SERPL-MCNC: 1 MG/DL (ref 0.1–1.5)
BUN SERPL-MCNC: 20 MG/DL (ref 8–22)
CALCIUM ALBUM COR SERPL-MCNC: 9.8 MG/DL (ref 8.5–10.5)
CALCIUM SERPL-MCNC: 8.4 MG/DL (ref 8.5–10.5)
CHLORIDE SERPL-SCNC: 104 MMOL/L (ref 96–112)
CO2 SERPL-SCNC: 22 MMOL/L (ref 20–33)
CREAT SERPL-MCNC: 0.54 MG/DL (ref 0.5–1.4)
ERYTHROCYTE [DISTWIDTH] IN BLOOD BY AUTOMATED COUNT: 44.2 FL (ref 35.9–50)
FUNGUS SPEC FUNGUS STN: NORMAL
GFR SERPLBLD CREATININE-BSD FMLA CKD-EPI: 108 ML/MIN/1.73 M 2
GLOBULIN SER CALC-MCNC: 3.9 G/DL (ref 1.9–3.5)
GLUCOSE BLD STRIP.AUTO-MCNC: 235 MG/DL (ref 65–99)
GLUCOSE BLD STRIP.AUTO-MCNC: 257 MG/DL (ref 65–99)
GLUCOSE BLD STRIP.AUTO-MCNC: 296 MG/DL (ref 65–99)
GLUCOSE SERPL-MCNC: 286 MG/DL (ref 65–99)
GRAM STN SPEC: NORMAL
HCT VFR BLD AUTO: 39.4 % (ref 42–52)
HGB BLD-MCNC: 12.7 G/DL (ref 14–18)
MCH RBC QN AUTO: 27.7 PG (ref 27–33)
MCHC RBC AUTO-ENTMCNC: 32.2 G/DL (ref 32.3–36.5)
MCV RBC AUTO: 86 FL (ref 81.4–97.8)
PLATELET # BLD AUTO: 230 K/UL (ref 164–446)
PMV BLD AUTO: 10.6 FL (ref 9–12.9)
POTASSIUM SERPL-SCNC: 4.4 MMOL/L (ref 3.6–5.5)
PROT SERPL-MCNC: 6.2 G/DL (ref 6–8.2)
RBC # BLD AUTO: 4.58 M/UL (ref 4.7–6.1)
SIGNIFICANT IND 70042: NORMAL
SIGNIFICANT IND 70042: NORMAL
SITE SITE: NORMAL
SITE SITE: NORMAL
SODIUM SERPL-SCNC: 137 MMOL/L (ref 135–145)
SOURCE SOURCE: NORMAL
SOURCE SOURCE: NORMAL
WBC # BLD AUTO: 24.9 K/UL (ref 4.8–10.8)

## 2025-07-10 PROCEDURE — A9270 NON-COVERED ITEM OR SERVICE: HCPCS | Performed by: INTERNAL MEDICINE

## 2025-07-10 PROCEDURE — 36415 COLL VENOUS BLD VENIPUNCTURE: CPT

## 2025-07-10 PROCEDURE — 700102 HCHG RX REV CODE 250 W/ 637 OVERRIDE(OP): Performed by: INTERNAL MEDICINE

## 2025-07-10 PROCEDURE — 700111 HCHG RX REV CODE 636 W/ 250 OVERRIDE (IP): Mod: JZ | Performed by: INTERNAL MEDICINE

## 2025-07-10 PROCEDURE — 770001 HCHG ROOM/CARE - MED/SURG/GYN PRIV*

## 2025-07-10 PROCEDURE — 99233 SBSQ HOSP IP/OBS HIGH 50: CPT | Performed by: INTERNAL MEDICINE

## 2025-07-10 PROCEDURE — 700101 HCHG RX REV CODE 250: Performed by: STUDENT IN AN ORGANIZED HEALTH CARE EDUCATION/TRAINING PROGRAM

## 2025-07-10 PROCEDURE — 80053 COMPREHEN METABOLIC PANEL: CPT

## 2025-07-10 PROCEDURE — 82962 GLUCOSE BLOOD TEST: CPT | Performed by: INTERNAL MEDICINE

## 2025-07-10 PROCEDURE — 700111 HCHG RX REV CODE 636 W/ 250 OVERRIDE (IP): Performed by: STUDENT IN AN ORGANIZED HEALTH CARE EDUCATION/TRAINING PROGRAM

## 2025-07-10 PROCEDURE — 94760 N-INVAS EAR/PLS OXIMETRY 1: CPT

## 2025-07-10 PROCEDURE — 700105 HCHG RX REV CODE 258: Performed by: STUDENT IN AN ORGANIZED HEALTH CARE EDUCATION/TRAINING PROGRAM

## 2025-07-10 PROCEDURE — 85027 COMPLETE CBC AUTOMATED: CPT

## 2025-07-10 PROCEDURE — 99223 1ST HOSP IP/OBS HIGH 75: CPT | Performed by: INTERNAL MEDICINE

## 2025-07-10 RX ORDER — BACLOFEN 10 MG/1
10 TABLET ORAL 3 TIMES DAILY PRN
Status: DISCONTINUED | OUTPATIENT
Start: 2025-07-10 | End: 2025-07-18 | Stop reason: HOSPADM

## 2025-07-10 RX ADMIN — CEFEPIME 2 G: 2 INJECTION, POWDER, FOR SOLUTION INTRAVENOUS at 17:00

## 2025-07-10 RX ADMIN — THIAMINE HYDROCHLORIDE 100 MG: 100 INJECTION, SOLUTION INTRAMUSCULAR; INTRAVENOUS at 04:36

## 2025-07-10 RX ADMIN — CEFEPIME 2 G: 2 INJECTION, POWDER, FOR SOLUTION INTRAVENOUS at 04:37

## 2025-07-10 RX ADMIN — OXYCODONE 5 MG: 5 TABLET ORAL at 19:37

## 2025-07-10 RX ADMIN — CHLORPROMAZINE HYDROCHLORIDE 25 MG: 25 TABLET, FILM COATED ORAL at 12:55

## 2025-07-10 RX ADMIN — INSULIN LISPRO 4 UNITS: 100 INJECTION, SOLUTION INTRAVENOUS; SUBCUTANEOUS at 16:54

## 2025-07-10 RX ADMIN — OXYCODONE 5 MG: 5 TABLET ORAL at 01:51

## 2025-07-10 RX ADMIN — VANCOMYCIN HYDROCHLORIDE 1250 MG: 5 INJECTION, POWDER, LYOPHILIZED, FOR SOLUTION INTRAVENOUS at 05:57

## 2025-07-10 RX ADMIN — BACLOFEN 10 MG: 10 TABLET ORAL at 15:40

## 2025-07-10 RX ADMIN — INSULIN LISPRO 7 UNITS: 100 INJECTION, SOLUTION INTRAVENOUS; SUBCUTANEOUS at 05:53

## 2025-07-10 RX ADMIN — CHLORPROMAZINE HYDROCHLORIDE 25 MG: 25 TABLET, FILM COATED ORAL at 05:49

## 2025-07-10 RX ADMIN — HYDROMORPHONE HYDROCHLORIDE 0.25 MG: 1 INJECTION, SOLUTION INTRAMUSCULAR; INTRAVENOUS; SUBCUTANEOUS at 09:42

## 2025-07-10 RX ADMIN — INSULIN GLARGINE-YFGN 15 UNITS: 100 INJECTION, SOLUTION SUBCUTANEOUS at 16:54

## 2025-07-10 RX ADMIN — OXYCODONE 5 MG: 5 TABLET ORAL at 11:49

## 2025-07-10 RX ADMIN — OXYCODONE 5 MG: 5 TABLET ORAL at 08:28

## 2025-07-10 RX ADMIN — INSULIN LISPRO 7 UNITS: 100 INJECTION, SOLUTION INTRAVENOUS; SUBCUTANEOUS at 11:49

## 2025-07-10 RX ADMIN — OXYCODONE 5 MG: 5 TABLET ORAL at 15:40

## 2025-07-10 ASSESSMENT — ENCOUNTER SYMPTOMS
PHOTOPHOBIA: 0
DIARRHEA: 0
ABDOMINAL PAIN: 0
DEPRESSION: 0
SHORTNESS OF BREATH: 0
INSOMNIA: 0
CONSTIPATION: 0
CHILLS: 0
SENSORY CHANGE: 0
CLAUDICATION: 0
VOMITING: 0
HEADACHES: 0
MYALGIAS: 1
DIZZINESS: 0
NERVOUS/ANXIOUS: 0
FEVER: 0
HEARTBURN: 0
WEAKNESS: 0
COUGH: 0
SPEECH CHANGE: 0
BLURRED VISION: 0

## 2025-07-10 ASSESSMENT — PAIN DESCRIPTION - PAIN TYPE
TYPE: ACUTE PAIN
TYPE: ACUTE PAIN;SURGICAL PAIN

## 2025-07-10 ASSESSMENT — FIBROSIS 4 INDEX: FIB4 SCORE: 2

## 2025-07-10 NOTE — CARE PLAN
The patient is Stable - Low risk of patient condition declining or worsening    Shift Goals  Clinical Goals: MRI in am, Post op vitals stable, patient safety  Patient Goals: Comfort, go home  Family Goals: lydia    Progress made toward(s) clinical / shift goals:  Patient updated on POC throughout shift. Patient demonstrated turning self in bed. Patient remained free from falls throughout shift.     Problem: Knowledge Deficit - Standard  Goal: Patient and family/care givers will demonstrate understanding of plan of care, disease process/condition, diagnostic tests and medications  Outcome: Progressing     Problem: Skin Integrity  Goal: Skin integrity is maintained or improved  Outcome: Progressing     Problem: Fall Risk  Goal: Patient will remain free from falls  Outcome: Progressing     Problem: Pain - Standard  Goal: Alleviation of pain or a reduction in pain to the patient’s comfort goal  Outcome: Progressing     Patient is not progressing towards the following goals:

## 2025-07-10 NOTE — PROGRESS NOTES
RN notified by charge RN patient had 7 beats vtach. Patient asymptomatic with no complaints of chest pain or discomfort. /74. RN notified MD.

## 2025-07-10 NOTE — WOUND TEAM
Received wound consult for mid-back and L foot.  Chart and clinical media reviewed.  Mid-back with healing abrasion, no interventions indicated at this time.    Patient to OR for L foot for L BKA. Please defer to Orthopedics for post-operative management of this surgical site.    Wound team not following, re-consult if patient has further advanced wound care needs.

## 2025-07-10 NOTE — PROGRESS NOTES
Telemetry summary    Rhythm SR  Rate 78-89    Ectopy r-oPVC, rTrig/Coup  Measurements 0.18/0.08/0.36    Normal Values  Rhythm SR  HR Range   Measurements 0.12-0.20/0.06-0.10/0.30-0.52

## 2025-07-10 NOTE — PROGRESS NOTES
Pt removed second IV since the beginning of this writer's shift. MD made aware and stated that the pt can have no IV access until next IV Rx is due.

## 2025-07-10 NOTE — OR NURSING
1810: Pt from OR to PACU via hospital bed, respirations, spontaneous, and non-labored. Icepack applied over c/d/I left leg surgical dressings. Pt sleeping, not rouseable at this time.  1825: No change. FSBG 172, plan to medicate per the MAR.  1840: Pt rouses to voice, falls asleep quickly. Nods appropriately, nods no to pain and nausea.  1855: No change. Report to Christina RICHARDSON.

## 2025-07-10 NOTE — PROGRESS NOTES
[facility] Medicine Daily Progress Note    Date of Service  7/10/2025    Chief Complaint  Jimmy Amaya is a 70 y.o. male admitted 7/7/2025 with infected left diabetic foot wound and AMS.    Hospital Course  Patient is a 70-year-old male with chronic diabetes uncontrolled with minimal medical compliance lives alone in a trailer.  He drove himself to the hospital in [place] secondary to the wound on his foot and while in [place] was told that he needed to be flown to [place] for amputation and at that point he signed himself out AGAINST MEDICAL ADVICE and apparently got in his car and drove away.   was called by the hospital for a wellness check and patient was found with his car parked on the side of the freeway and was wandering in the middle of the freeway in [place] when he was picked back up again.  He was taken back to the hospital and then sent here for further evaluation.  The wounds on his foot are covered with maggots which are draining from the abscess.  Initially he was admitted to the intensive care unit and started on aggressive IV antibiotics.  Patient downgraded from the intensive care unit to the telemetry unit and orthopedic surgery consulted.  Patient has osteomyelitis in the 5th and 4th metatarsal on MRI in addition to the severe macerated skin and muscle on the left lateral border.  Patient to be taken to the OR for amputation below the knee.    Interval Problem Update  7/9 patient seen for the first time today after downgrade from the ICU.  He is aware that he has an infection in his left foot and is aware that he needs amputation.  Orthopedic surgery saw the patient and will be taking him for a transtibial amputation.  7/10 patient seems to be slightly more coherent today however this is in between sessions where he forgets where he is and yanks out his IV.  He will be without IV access until his next dose of antibiotics.  Infectious disease to consult given the continued infection despite  amputation of the foot.  ONUR actually showed good blood flow.  WBC count elevated secondary to surgical intervention yesterday.    I have discussed this patient's plan of care and discharge plan at IDT rounds today with Case Management, Nursing, Nursing leadership, and other members of the IDT team.    Consultants/Specialty  orthopedics    Code Status  Full Code    Disposition  The patient is not medically cleared for discharge to home or a post-acute facility.  Anticipate discharge to: skilled nursing facility    I have placed the appropriate orders for post-discharge needs.    Review of Systems  Review of Systems   Constitutional:  Negative for chills and fever.   HENT:  Negative for congestion.    Eyes:  Negative for blurred vision and photophobia.   Respiratory:  Negative for cough and shortness of breath.    Cardiovascular:  Negative for chest pain, claudication and leg swelling.   Gastrointestinal:  Negative for abdominal pain, constipation, diarrhea, heartburn and vomiting.   Genitourinary:  Negative for dysuria and hematuria.   Musculoskeletal:  Positive for myalgias. Negative for joint pain.   Skin:  Negative for itching and rash.   Neurological:  Negative for dizziness, sensory change, speech change, weakness and headaches.   Psychiatric/Behavioral:  Negative for depression. The patient is not nervous/anxious and does not have insomnia.         Physical Exam  Temp:  [36.1 °C (96.9 °F)-36.8 °C (98.2 °F)] 36.6 °C (97.9 °F)  Pulse:  [77-96] 81  Resp:  [16-27] 18  BP: (113-152)/(63-87) 146/87  SpO2:  [90 %-97 %] 91 %    Physical Exam  Vitals and nursing note reviewed.   Constitutional:       General: He is not in acute distress.     Appearance: Normal appearance.   HENT:      Head: Normocephalic and atraumatic.   Eyes:      General: No scleral icterus.     Extraocular Movements: Extraocular movements intact.   Cardiovascular:      Rate and Rhythm: Normal rate and regular rhythm.      Pulses: Normal pulses.       Heart sounds: Normal heart sounds. No murmur heard.  Pulmonary:      Effort: Pulmonary effort is normal. No respiratory distress.      Breath sounds: Normal breath sounds. No wheezing, rhonchi or rales.   Abdominal:      General: Abdomen is flat. Bowel sounds are normal. There is no distension.      Palpations: Abdomen is soft.      Tenderness: There is no rebound.   Musculoskeletal:         General: No swelling or tenderness.      Cervical back: Normal range of motion and neck supple.      Comments: Left below the knee amputation in place, postoperative dressing in place, wound incisional VAC in place   Lymphadenopathy:      Cervical: No cervical adenopathy.   Skin:     Coloration: Skin is not jaundiced.      Findings: No erythema.   Neurological:      General: No focal deficit present.      Mental Status: He is alert and oriented to person, place, and time. Mental status is at baseline.      Cranial Nerves: No cranial nerve deficit.   Psychiatric:         Mood and Affect: Mood normal.         Behavior: Behavior normal.         Fluids    Intake/Output Summary (Last 24 hours) at 7/10/2025 1428  Last data filed at 7/10/2025 1300  Gross per 24 hour   Intake 2020 ml   Output 1015 ml   Net 1005 ml        Laboratory  Recent Labs     07/08/25  0325 07/09/25  0338 07/10/25  0242   WBC 20.3* 20.7* 24.9*   RBC 4.37* 4.66* 4.58*   HEMOGLOBIN 12.2* 12.9* 12.7*   HEMATOCRIT 36.6* 41.6* 39.4*   MCV 83.8 89.3 86.0   MCH 27.9 27.7 27.7   MCHC 33.3 31.0* 32.2*   RDW 40.7 45.8 44.2   PLATELETCT 200 212 230   MPV 12.2 10.4 10.6     Recent Labs     07/08/25  0325 07/09/25  0338 07/10/25  0242   SODIUM 133* 136 137   POTASSIUM 4.8 4.3 4.4   CHLORIDE 98 104 104   CO2 23 19* 22   GLUCOSE 253* 173* 286*   BUN 27* 23* 20   CREATININE 0.82 0.65 0.54   CALCIUM 8.5 8.4* 8.4*                   Imaging  US-ONUR SINGLE LEVEL BILAT   Final Result      MR-FOOT-WITH & W/O LEFT   Final Result      1.  There is a large area of necrotic tissue/abscess  in the lateral aspect of the forefoot.   2.  Osteomyelitis with necrosis versus intraosseous gas of the fifth metatarsal and the proximal and middle phalanges.   3.  There is also abnormal signal within the fourth metatarsal and proximal phalanx which may be reactive although additional infection cannot be excluded.   4.  There is diffuse soft tissue edema and enhancement which likely represents cellulitis and myositis.      DX-CHEST-PORTABLE (1 VIEW)   Final Result      Elevation of the right hemidiaphragm. Patchy right basilar opacities, atelectasis or infection.           Assessment/Plan  * Diabetic foot infection (HCC)- (present on admission)  Assessment & Plan  Hx of prior wounds on the foot now worse, XR showing osteo, 4/4 SIRS given sepsis bolus at OSH not hypotensive  Continue with cefepime  Purulent track age at the anterior portion of his leg discovered during BKA, area of purulence was washed extensively and cultures obtained and wound incision VAC in place.  Because there was evidence of continued infection despite amputation, infectious disease consultation requested  Interoperative cultures showing no growth    Hiccups  Assessment & Plan  Thorazine prn      Acute hypoxic respiratory failure (HCC)- (present on admission)  Assessment & Plan  Resolved, on room air      Self neglect- (present on admission)  Assessment & Plan  Patient currently does not have decision making capacity, daughter is NOK for consents  Capacity eval done by psychiatry    Sepsis ([facility])- (present on admission)  Assessment & Plan  This is Sepsis Present on admission  SIRS criteria identified on my evaluation include: Fever, with temperature greater than 100.9 deg F, Tachycardia, with heart rate greater than 90 BPM, Tachypnea, with respirations greater than 20 per minute, and Leukocytosis, with WBC greater than 12,000  Clinical indicators of end organ dysfunction include Lactic Acid greater than 2  Source is osteo L foot  Sepsis  protocol initiated  Crystalloid Fluid Administration: Fluid resuscitation ordered per standard protocol - 30 mL/kg per current or ideal body weight at OSH given  IV antibiotics as appropriate for source of sepsis  Reassessment: I have reassessed the patient's hemodynamic status      Altered mental state  Assessment & Plan  Exacerbated by his infxn, concern for baseline mentation decline per sister, house is full of garbage and pt had maggots in his wound found wandering highway  -discussed with daughter, had memory issues in the past and difficulty caring for self but not the severe change that occurred in last week  -Will get MRI brain to r/o stroke once infection/leg in dealt with, discussed with patient today currently he does not want an MRI we will address later in the hospital stay  - Times when he is coherent and other times where he is completely confused, still not appropriate capacity to make medical decisions  - Frequent orientation      Type 2 diabetes mellitus with circulatory disorder, without long-term current use of insulin (HCC)- (present on admission)  Assessment & Plan  ISS with PO intake  Diabetic diet after surgery      Type 2 diabetes mellitus with diabetic polyneuropathy (HCC)- (present on admission)  Assessment & Plan  Uncontrolled due to noncompliance, no sign of DKA/HOS  -15 u of Lantus and SSI  -Accuchecks and hypoglycemia protocol         VTE prophylaxis:   SCDs/TEDs      I have performed a physical exam and reviewed and updated ROS and Plan today (7/10/2025). In review of yesterday's note (7/9/2025), there are no changes except as documented above.    My total time spent caring for the patient on the day of the encounter was 52 minutes.   This does not include time spent on separately billable procedures/tests.

## 2025-07-10 NOTE — OP REPORT
Operative Report    DATE OF SURGERY: 7/9/25    ATTENDING: Miguel Youngblood MD    PREOPERATIVE DIAGNOSIS:  left foot soft tissue infection with osteomyelitis of 4th and 5th metatarsal    POSTOPERATIVE DIAGNOSIS:  same    PROCEDURE PERFORMED:  left below knee amputation    SURGEON:  Miguel Youngblood MD    ASSISTANTS:  none    ANESTHESIA:  general    ESTIMATED BLOOD LOSS:  100mL    FLUIDS:  Per anesthesia report    DRAINS:  hemovac drain  Incisional wound vac    FINDINGS: purulence noted upon incision into the anterior fascial compartment, no necrotic tissue noted in the proximal stump    COMPLICATIONS:  None apparent    DISPOSITION:  Stable to recovery room    DESCRIPTION OF THE PROCEDURE:  The patient was identified in the preoperative  holding area.  The operative site was marked.  The nature of the procedure  was reviewed and informed consent was confirmed.  The patient was then evaluated by anesthesia and subsequently taken to  the operating room where anesthesia was administered.  The patient was positioned supine.  The left lower extremity was prepped and draped in a standard  fashion.  Preoperative antibiotics were administered.  A  time-out was performed.    We started by marking the limbs of the incision, measuring 10cm distal from the tibial tubercle for the anterior limb, and another 15cm for the posterior limb.  We made incision along the transverse portion of the anterior limb and carried sharply to bone.  We sharply incised through the anterior compartment, at which point we encountered purulent fluid within the compartment.  We continued our incision along the longitudinal limbs medial and lateral down to muscle fascia.  We free periosteum off the tibia and fibula.  We made a transverse osteotomy through the tibia, and again through the fibula approximately 2cm proximal.  We sharply incised through the remainder of the posterior soft tissue structures and through the posterior limb.      We thoroughly  irrigated the wound with 6L of NS.  Clean drapes were placed, dirty instruments were removed, and all members of the surgical team changed into new gown and gloves.  We then identified the neurovascular structures.  Vessels were tied off.  Nerves were dissected out and sharply transected in the proximal portion of the wound.  Tourniquet was let down and any remaining bleeding vessels were identified and cauterized.  The wound was then irrigated again with 3L NS.    The wound was closed in a layered fashion with #1 vicryl for the fascia, followed by 0 vicryl, 2-0 monocryl, and 2-0 nylon for the skin.  Drain was placed deep.  Incisional wound vac was applied, followed by compression dressing.    The patient was awakened from anesthesia and transported to the recovery room in stable condition.  There were no apparent intraoperative complications.  Counts were correct.     POSTOP PLAN:  NWB LLE  Follow up intra-op cultures  DC drain likely POD2  Broad spectrum abx until culture results  Consider ID consult for extended antibiotic management given presence of purulent fluid in the leg      Miguel Youngblood MD  [facility]

## 2025-07-10 NOTE — PROGRESS NOTES
[facility] Internal Medicine Daily Progress Note    Date of Service  1/5/2023    R Team: R IM Green Team   Attending: Skinny Diggs M.d.  Senior Resident: Dr. Benavidez  Intern:  Dr. Mast  Contact Number: [phone]    Chief Complaint  Jimmy Amaya is a 67 y.o. male admitted 1/4/2023 with Sepsis secondary to left diabetic foot ulcer    Hospital Course  No notes on file    Interval Problem Update  -No acute events overnight  -I&D scheduled in afternoon      I have discussed this patient's plan of care and discharge plan at IDT rounds today with Case Management, Nursing, Nursing leadership, and other members of the IDT team.    Consultants/Specialty  orthopedics and Limb preservation service    Code Status  Full Code    Disposition  Patient is not medically cleared for discharge.   Anticipate discharge to to home with close outpatient follow-up.  I have placed the appropriate orders for post-discharge needs.    Review of Systems  Review of Systems   Constitutional:  Negative for chills, fever and malaise/fatigue.   HENT:  Negative for hearing loss.    Eyes:  Negative for blurred vision and double vision.   Respiratory:  Negative for cough, shortness of breath and wheezing.    Cardiovascular:  Positive for leg swelling. Negative for chest pain.   Gastrointestinal:  Negative for abdominal pain.   Genitourinary:  Negative for dysuria.   Skin:  Positive for rash. Negative for itching.   Neurological:  Negative for dizziness, loss of consciousness and headaches.   Psychiatric/Behavioral:  The patient does not have insomnia.       Physical Exam  Temp:  [37.1 °C (98.8 °F)-38.5 °C (101.3 °F)] 38 °C (100.4 °F)  Pulse:  [] 97  Resp:  [16-20] 18  BP: (118-158)/(50-80) 124/50  SpO2:  [90 %-96 %] 92 %    Physical Exam  Vitals and nursing note reviewed.   Constitutional:       General: He is not in acute distress.     Appearance: Normal appearance. He is obese.   HENT:      Head: Normocephalic and atraumatic.      Right Ear:  Patient was seen in the ER yesterday, 07.09.25 and prescribed Xanax   External ear normal.      Left Ear: External ear normal.   Eyes:      General: No scleral icterus.     Conjunctiva/sclera: Conjunctivae normal.   Cardiovascular:      Rate and Rhythm: Normal rate and regular rhythm.      Heart sounds: Normal heart sounds. No murmur heard.  Abdominal:      General: Abdomen is flat. There is no distension.      Tenderness: There is no abdominal tenderness.   Musculoskeletal:        Feet:    Feet:      Left foot:      Skin integrity: Ulcer and erythema present. No skin breakdown.      Comments: Some possible lesions like tophi present on left(purple)  Skin:     General: Skin is warm.      Coloration: Skin is not jaundiced.      Findings: Rash present. No erythema.   Neurological:      General: No focal deficit present.      Mental Status: He is alert and oriented to person, place, and time. Mental status is at baseline.   Psychiatric:         Mood and Affect: Mood normal.         Behavior: Behavior normal.         Thought Content: Thought content normal.         Judgment: Judgment normal.       Fluids    Intake/Output Summary (Last 24 hours) at 1/5/2023 1549  Last data filed at 1/5/2023 0000  Gross per 24 hour   Intake 760 ml   Output 200 ml   Net 560 ml       Laboratory  Recent Labs     01/03/23  0255 01/04/23  0136 01/05/23  0209   WBC 14.0* 13.7* 15.8*   RBC 4.88 4.70 4.56*   HEMOGLOBIN 13.6* 13.1* 12.6*   HEMATOCRIT 41.5* 40.7* 38.4*   MCV 85.0 86.6 84.2   MCH 27.9 27.9 27.6   MCHC 32.8* 32.2* 32.8*   RDW 43.3 44.1 42.4   PLATELETCT 200 212 216   MPV 10.4 10.6 10.1     Recent Labs     01/03/23  0255 01/04/23  0136 01/05/23  0209   SODIUM 132* 135 130*   POTASSIUM 3.8 3.6 3.3*   CHLORIDE 101 101 98   CO2 21 24 23   GLUCOSE 224* 183* 201*   BUN 15 11 8   CREATININE 0.72 0.64 0.54   CALCIUM 8.4 8.3* 8.1*                   Imaging  US-ONUR SINGLE LEVEL BILAT              Assessment/Plan  Problem Representation:    * Sepsis (HCC)- (present on admission)  Assessment & Plan  Criteria:  febrile 101.3, WBC=18.9, tachycardia 101, Clear source of Left diabetic foot ulcer,  Present on admission at [facility]  WBC downtrending with IV antibiotics    Antibiotics: Unasyn, Linezolid (MRSA nares pending)    Left diabetic foot ulcer and infection- (present on admission)  Assessment & Plan  Elevated crp during admission at [facility] prior to transfer  MRI negative for osteomyelitis. ONUR shows normal arterial flow.     - Wound care consulted, following.  - wound culture + MRSA. Continue unasyn and linezolid  - leukocytosis improving, blood cultures NGTD  -- holding chemical dvt ppx for procedure 1/5/23, I&D      Type 2 diabetes mellitus with circulatory disorder, without long-term current use of insulin (HCC)- (present on admission)  Assessment & Plan  Not on home med, pt states ran out of metformin. Last A1C 9.3% on 1/2/22    - insulin glargine 10 U   - sliding scale insulin   - hypoglycemia protocol  - diabetic education     Hyponatremia- (present on admission)  Assessment & Plan  Na 130 on presentation, improved with NS infusion  -Resolved           VTE prophylaxis: SCDs/TEDs and pharmacologic prophylaxis contraindicated due to Procedure    I have performed a physical exam and reviewed and updated ROS and Plan today (1/5/2023). In review of yesterday's note (1/4/2023), there are no changes except as documented above.      Discharge pending I&D, Infection resolution.

## 2025-07-10 NOTE — ANESTHESIA POSTPROCEDURE EVALUATION
Patient: Jimmy Amaya    Procedure Summary       Date: 07/09/25 Room / Location:  OR 06 / SURGERY [facility]    Anesthesia Start: 1548 Anesthesia Stop: 1815    Procedure: AMPUTATION, BELOW KNEE (Left: Leg Lower) Diagnosis: (Left foot infection with osteomyelitis)    Surgeons: Jacques Youngblood M.D. Responsible Provider: Dian Lopes M.D.    Anesthesia Type: general ASA Status: 3 - Emergent            Final Anesthesia Type: general  Last vitals  BP   Blood Pressure : (!) 141/83    Temp   36.4 °C (97.5 °F)    Pulse   81   Resp   18    SpO2   94 %      Anesthesia Post Evaluation    Patient location during evaluation: PACU  Patient participation: complete - patient participated  Level of consciousness: awake and alert    Airway patency: patent  Anesthetic complications: no  Cardiovascular status: hemodynamically stable  Respiratory status: acceptable  Hydration status: euvolemic    PONV: none          No notable events documented.     Nurse Pain Score: 0 (NPRS)

## 2025-07-10 NOTE — CARE PLAN
The patient is Stable - Low risk of patient condition declining or worsening    Shift Goals  Clinical Goals: Pain mgmt, PT/OT, no ripping out IVs  Patient Goals: Comfort  Family Goals: lydia    Progress made toward(s) clinical / shift goals:  Pain managed with PRN Rx. Baclofen added for hiccups. Pt repositions self in bed. Pt tore out 2x IV; pt educated on need of IV and the necessity of restraints if they remove another IV.       Problem: Pain - Standard  Goal: Alleviation of pain or a reduction in pain to the patient’s comfort goal  Outcome: Progressing       Patient is not progressing towards the following goals:      Problem: Knowledge Deficit - Standard  Goal: Patient and family/care givers will demonstrate understanding of plan of care, disease process/condition, diagnostic tests and medications  Outcome: Progressing     Problem: Skin Integrity  Goal: Skin integrity is maintained or improved  Outcome: Progressing     Problem: Fall Risk  Goal: Patient will remain free from falls  Outcome: Progressing

## 2025-07-10 NOTE — DIETARY
Nutrition Services: Follow-up for Nutrition Care Plan   Day 3 of admit. Jimmy Amaya is a 70 y.o. male with admitting DX of Diabetic foot infection.    S/P left BKA on 7/9. Per wound team note, patient with healing abrasion to mid-back.     Diet advanced to Consistent CHO. Spoke with patient and family at bedside. Observed he ate 1/2 a sandwich at lunch today. Family reports he has not eaten much in the past few days.     Reviewed features of Consistent CHO diet order with patient and family. Patient stated understanding. Discussed importance of good nutrition to support healing. Patient is agreeable to addition of Deep BID.    Current diet order:   Consistent CHO diet     Nutrition Dx: Increased nutrient needs related to healing needs as evidenced by diabetic foot wound, S/P BKA.    Problem: Nutritional:  Goal: Achieve adequate nutritional intake  Description: Patient will consume >50% of meals and supplements  Outcome: Progressing      Plan/ Recommendations:      Deep BID.  Encourage intake of meals, goal for >50% consumption from meals and/or supplements  Document intake of all meals as % taken in ADLs to provide interdisciplinary communication across all shifts.   Monitor weight.  Nutrition rep available upon request to see patient for ongoing meal and snack preferences.       RD following

## 2025-07-10 NOTE — PROGRESS NOTES
Telemetry Shift Summary     Per monitor tech:  Rhythm SR/ST  HR Range   Ectopy rPAC/PVC/Coup/Big/Trig  Measurements 0.16/0.08/0.36      Normal Values  Rhythm SR  HR Range:   Measurements: 0.12-0.20/0.06-0.10/0.30-0.52

## 2025-07-10 NOTE — CONSULTS
"INFECTIOUS DISEASES INPATIENT CONSULT NOTE     Date of Service:7/10/25    Consult Requested By: Kinjal Jean D.O.    Reason for Consultation: necrotic foot    History of Present Illness:   Jimmy Amaya is a 70 y.o. male admitted 7/7/2025 for necrotic left foot wound with maggots  From admit \"a 70 y.o. male w/ PMH of DM2 uncontrolled who presented 7/7/2025 with foot pain L. Sent from OSH after being found with foot wound with maggots present, also concern for bedbugs, XR at OSH showed erosion suggestive of osteo. He was given Vanc and Zosyn for this as well as insulin for his hyperglycemia of 454. Also noted to be febrile, tachycardic, and have leukocytosis with elevated lactate so he was given 2 L of IVF.   Upon arrival to the ICU pt was slightly altered vitals showed improved tachycardia. Labs showed stable leukocytosis, mild hyponatremia w/ improved hyperglycemia. 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 on 7/9  Infectious Diseases consulted for antibiotic recommendation and management      Review Of Systems:  Psych disorder    PMH:   Past Medical History[1]      PSH:  Past Surgical History[2]    FAMILY HX:  Family History   Problem Relation Age of Onset    Cancer Father        SOCIAL HX:  Social History     Socioeconomic History    Marital status:      Spouse name: Not on file    Number of children: Not on file    Years of education: Not on file    Highest education level: Not on file   Occupational History    Not on file   Tobacco Use    Smoking status: Never    Smokeless tobacco: Never   Vaping Use    Vaping status: Never Used   Substance and Sexual Activity    Alcohol use: Not Currently    Drug use: Never    Sexual activity: Not Currently   Other Topics Concern    Not on file   Social History Narrative    Not on file     Social Drivers of Health     Financial Resource Strain: Not on file   Food Insecurity: Patient Declined (7/8/2025) " "   Hunger Vital Sign     Worried About Running Out of Food in the Last Year: Patient declined     Ran Out of Food in the Last Year: Patient declined   Transportation Needs: No Transportation Needs (2025)    PRAPARE - Transportation     Lack of Transportation (Medical): No     Lack of Transportation (Non-Medical): No   Physical Activity: Not on file   Stress: Not on file   Social Connections: Not on file   Intimate Partner Violence: Not At Risk (2025)    Humiliation, Afraid, Rape, and Kick questionnaire     Fear of Current or Ex-Partner: No     Emotionally Abused: No     Physically Abused: No     Sexually Abused: No   Housing Stability: Unknown (2025)    Housing Stability Vital Sign     Unable to Pay for Housing in the Last Year: Patient declined     Number of Times Moved in the Last Year: Not on file     Homeless in the Last Year: No     Tobacco Use History[3]  Social History     Substance and Sexual Activity   Alcohol Use Not Currently       Allergies/Intolerances:  Allergies[4]      Other Current Medications:  Current Medications[5]      Most Recent Vital Signs:  BP (!) 146/87 Comment: rn notified  Pulse 81   Temp 36.6 °C (97.9 °F) (Temporal)   Resp 18   Ht 1.88 m (6' 2\")   Wt 113 kg (250 lb)   SpO2 91%   BMI 32.10 kg/m²   Temp  Av.6 °C (97.8 °F)  Min: 36.1 °C (96.9 °F)  Max: 37.2 °C (99 °F)    Physical Exam:  General:  no acute distress  HEENT: NCAT, PERRLA, sclera anicteric, PERRL, EOMI,  no oral lesions Dentition  Neck: supple, no lymphadenopathy  Chest: unlabored.  Cardiac: RRR  Abdomen:  non-distended  Extremities: admission pictures show large necrotic ulcer dorsal foot  withlateral wound with visible maggots    Pertinent Lab Results:  Recent Labs     25  03225  0338 07/10/25  0242   WBC 20.3* 20.7* 24.9*      Recent Labs     25  2243 25  0325 25  0338 07/10/25  0242   HEMOGLOBIN 13.4* 12.2* 12.9* 12.7*   HEMATOCRIT 39.6* 36.6* 41.6* 39.4*   MCV 83.4 " 83.8 89.3 86.0   MCH 28.2 27.9 27.7 27.7   MACROCYTOSIS 1+  --   --   --    PLATELETCT 211 200 212 230         Recent Labs     07/08/25  0325 07/09/25  0338 07/10/25  0242   SODIUM 133* 136 137   POTASSIUM 4.8 4.3 4.4   CHLORIDE 98 104 104   CO2 23 19* 22   CREATININE 0.82 0.65 0.54        Recent Labs     07/08/25 0325 07/09/25  0338 07/10/25  0242   ALBUMIN 2.6* 2.6* 2.3*        Pertinent Micro:  Results       Procedure Component Value Units Date/Time    Fungal Culture [[id number]] Collected: 07/09/25 1614    Order Status: Completed Specimen: Wound Updated: 07/10/25 0411     Significant Indicator NEG     Source WND     Site Left leg #1     Culture Result Culture in progress.     Fungal Smear Results -    CULTURE WOUND W/ GRAM STAIN [[id number]] Collected: 07/09/25 1614    Order Status: No result Specimen: Wound Updated: 07/10/25 0411     Significant Indicator NEG     Source WND     Site Left leg #1     Culture Result -     Gram Stain Result Many WBCs.  Moderate Gram positive cocci.  Few Gram negative rods.      Anaerobic Culture [[id number]] Collected: 07/09/25 1614    Order Status: No result Specimen: Wound Updated: 07/10/25 0411     Significant Indicator NEG     Source WND     Site Left leg #1     Culture Result -    GRAM STAIN [[id number]] Collected: 07/09/25 1614    Order Status: Completed Specimen: Wound Updated: 07/10/25 0126     Significant Indicator .     Source WND     Site Left leg #1     Gram Stain Result Many WBCs.  Moderate Gram positive cocci.  Few Gram negative rods.      AFB CULTURE AND STAIN (ACID-FAST BACILLUS) [[id number]] Collected: 07/09/25 1614    Order Status: Canceled     MRSA By PCR (Amp) [[id number]] Collected: 07/07/25 2327    Order Status: Completed Specimen: Respirate from Nares Updated: 07/08/25 1253     MRSA by PCR Negative    Parasite, Gross Identification [[id number]] Collected: 07/08/25 0600    Order Status: Completed Specimen: Other Updated: 07/08/25 1137     Significant Indicator NEG  "    Source OTHER     Site Gross ID     Parasite, Gross Identification Organism submitted does not resemble a known human parasite.    Narrative:      Per DEVIN in Micro, put as MISC and they would convert code.  Bug Identification          No results found for: \"BLOODCULTU\", \"BLDCULT\", \"BCHOLD\"     Studies:    IMPRESSION:   Necrotic foot with maggots  -Purulence seen in fascia  -current on tetanus-last given 2023  Psych disorder  Noncompliance-has pulled out IV already  Leukocytosis    PLAN:   Purulence seen in anterior soft tissue-no necrosis stump per op note  Recommend revision BKA for definitive treatment if concern for residual bone infection  If unable to get new IV, start Augmentin 875 mg PO BID   FU operative cultures      Plan of care discussed with DIANE Landers M.D.         [1]   Past Medical History:  Diagnosis Date    Diabetes (HCC)     Self neglect 07/08/2025   [2]   Past Surgical History:  Procedure Laterality Date    ORIF, FINGER Right 12/7/2023    Procedure: RIGHT SMALL FINGER OPEN REDUCTION INTERNAL FIXATION, OPEN FRACTURE DEBRIDEMENT, EXTENSOR TENDON REPAIR;  Surgeon: Anabelle Dallas M.D.;  Location: SURGERY SAME DAY [facility];  Service: Orthopedics    WOUND IRRIGATION & DEBRIDEMENT Right 12/7/2023    Procedure: IRRIGATION AND DEBRIDEMENT, WOUND;  Surgeon: Anabelle Dallas M.D.;  Location: SURGERY SAME DAY [facility];  Service: Orthopedics    EXTENSOR TENDON REPAIR Right 12/7/2023    Procedure: REPAIR, TENDON, EXTENSOR;  Surgeon: Anabelle Dallas M.D.;  Location: SURGERY SAME DAY [facility];  Service: Orthopedics    INCISION AND DRAINAGE ORTHOPEDIC Left 1/5/2023    Procedure: INCISION AND DRAINAGE, WOUND, BY ORTHOPEDICS - FOOT;  Surgeon: Giancarlo García M.D.;  Location: SURGERY [facility];  Service: Orthopedics    DENTAL EXTRACTION(S)     [3]   Social History  Tobacco Use   Smoking Status Never   Smokeless Tobacco Never   [4] No Known Allergies  [5]   Current " Facility-Administered Medications:     oxyCODONE immediate-release (Roxicodone) tablet 2.5 mg, 2.5 mg, Oral, Q3HRS PRN **OR** oxyCODONE immediate-release (Roxicodone) tablet 5 mg, 5 mg, Oral, Q3HRS PRN, 5 mg at 07/10/25 1149 **OR** HYDROmorphone (Dilaudid) injection 0.25 mg, 0.25 mg, Intravenous, Q3HRS PRN, Kinjal Jean D.O., 0.25 mg at 07/10/25 0942    chlorproMAZINE (Thorazine) tablet 25 mg, 25 mg, Oral, TID PRN, Kinjal Jean D.O., 25 mg at 07/10/25 1255    insulin GLARGINE (Lantus,Semglee) injection, 15 Units, Subcutaneous, Q EVENING, Johan Hearn D.O., 15 Units at 07/08/25 1735    insulin lispro (HumaLOG,AdmeLOG) subcutaneous injection, 3-14 Units, Subcutaneous, Q6HRS, 7 Units at 07/10/25 1149 **AND** POC blood glucose manual result, , , Q6H **AND** NOTIFY MD and PharmD, , , Once **AND** Administer 20 grams of glucose (approximately 8 ounces of fruit juice) every 15 minutes PRN FSBG less than 70 mg/dL, , , PRN **AND** dextrose 50 % (D50W) injection 25 g, 25 g, Intravenous, Q15 MIN PRN, Jesus Jolly D.O.    ondansetron (Zofran) syringe/vial injection 4 mg, 4 mg, Intravenous, Q4HRS PRN, Renato Patton M.D., 4 mg at 07/08/25 2014    [Held by provider] heparin injection 5,000 Units, 5,000 Units, Subcutaneous, Q8HRS, Jesus Jolly D.O., 5,000 Units at 07/08/25 0054    cefepime (Maxipime) 2 g in  mL IVPB, 2 g, Intravenous, Q12HRS, Johan Hearn D.O., Stopped at 07/10/25 0508

## 2025-07-10 NOTE — PROGRESS NOTES
Subjective   Patient ID:  Alissa Amaya is a 70 y.o. male.      HPI  Alissa is POD # 1 Left BKA by Dr. Youngblood on 7/10/25 for foot wound and maggots.  He is complaining of pain, but tells me the nurses are helping to manage this.   His sister is at the Bedside. Daughter, next of kin, is not currently present. Pulled out his 2nd IV. Nurse at Bedside states he has had some confusion since surgery.   Sitting up eating lunch.     Objective   Ortho Exam  General: Patient is answering questions appropriately, but does have some confusion to complex questions, doesn't remember removing his IV.   LLE: Extension of the knee intact. No pain to palpation of the medial or lateral joint spaces. Dressing and wound vac in place. No significant drainage in the wound vac. Hemovac has approximately 10cc bloody drainage in the drain to self suction.     CBC 24.9, increased since yesterday.   FSBS 200-296  Cultures: Gram positive cocci, few gram negative rods - on Cefepime    Last Imaging Result(s):   US-ONUR SINGLE LEVEL BILAT   Vascular Laboratory   Conclusions   BILATERAL LOWER EXTREMITY ARTERIES:.    The ankle-brachial indices are normal.      ALISSA AMAYA     Age:    70    Gender:     M     MRN:    [id number]     :    1955      BSA:     Exam Date:     2025 13:37     Room #:     Inpatient     Priority:     Routine     Ht (in):             Wt (lb):     Ordering Physician:        GEORGI SOLORIO     Referring Physician:       [id number] Serrato     Sonographer:               Danial Nuñez RVOTTONIEL     Study Type:                Complete Bilateral     Technical Quality:         Good     Indications:     Type 2 diabetes mellitus with foot ulcer     CPT Codes:       60494     ICD Codes:       E11.621     History:         diabetic left lateral oozing foot wound; maggots;                     osteomyelitis; sepsis; prior exam 23     Limitations:                    RIGHT     Waveform            Systolic BPs (mmHg)                               128           Brachial   Triphasic                                Common Femoral   Triphasic                                Popliteal   Triphasic                  166           Posterior Tibial   Triphasic                  138           Dorsalis Pedis                                            Digit                              1.30          ONUR                                            TBI                          LEFT   Waveform        Systolic BPs (mmHg)                              128           Brachial   Triphasic                                Common Femoral   Triphasic                                Popliteal   Triphasic                  140           Posterior Tibial   Triphasic                  136           Dorsalis Pedis                                            Digit                              1.09          ONUR                                            TBI     Findings   BILATERAL LOWER EXTREMITY ARTERIES:   Doppler waveforms of the common femoral artery and popliteal artery are    high amplitude and triphasic.    Doppler waveforms at the ankle are brisk and triphasic.    The ankle-brachial indices are normal.    There is no evidence of significant arterial disease demonstrated on the    right or the left.     Additional testing was not performed in accordance with lower extremity    arterial evaluation protocol.      Benjamín Bhatia MD   (Electronically Signed)   Final Date:      09 July 2025                     21:33  MR-FOOT-WITH & W/O LEFT  Narrative: 7/8/2025 4:23 PM    HISTORY/REASON FOR EXAM:  osteo.  Skin ulcer    TECHNIQUE/EXAM DESCRIPTION: MRI of the LEFT foot with and without contrast.    The study was performed on a [employer] Signa 1.5 Yessica MRI scanner. T1 axial, T1 sagittal, T1 coronal, STIR sagittal, T2 fast spin-echo fat-suppressed coronal, sagittal inversion recovery, and T1 postcontrast coronal images were obtained.    20 mL ProHance contrast was administered  intravenously.    COMPARISON: 1/3/2023    FINDINGS:  There is reticulated low T1 and high T2-weighted signal intensity involving the subcutaneous tissues. This is consistent with edema and may be seen with cellulitis. There is a large gas and fluid collection in the dorsal aspect of the forefoot laterally   consistent with abscess. This measures approximately 7.1 cm in transverse dimension, 2.7 cm in dorsal plantar dimension, and 10.9 cm in longitudinal dimension. This wraps around the lateral aspect of the fifth metatarsal and also appears to extend   between the fourth and fifth metatarsal bases. Fluid tracks proximally plantar to the fourth metatarsal to the level of the tarsometatarsal joint. There appears to be a wound or ulcer plantar to the fifth metatarsal head.    There is markedly abnormal signal within the fifth metatarsal as well as the proximal and middle phalanges. There is high T2-weighted signal and enhancement with multiple areas of low signal. The low signal may represent intraosseous gas or necrotic   bone. There is also some abnormal signal within the fourth metatarsal and proximal phalanx. This may be reactive although additional infection cannot be excluded.    There is hallux valgus deformity and first metatarsophalangeal joint osteoarthrosis.    There is diffuse T2 hyperintensity and enhancement of the intrinsic foot musculature. This is nonspecific and may be seen with myositis, ischemia, and denervation. There is at least a component of degeneration given fatty infiltration noted on the   T1-weighted sequences.  Impression: 1.  There is a large area of necrotic tissue/abscess in the lateral aspect of the forefoot.  2.  Osteomyelitis with necrosis versus intraosseous gas of the fifth metatarsal and the proximal and middle phalanges.  3.  There is also abnormal signal within the fourth metatarsal and proximal phalanx which may be reactive although additional infection cannot be excluded.  4.   There is diffuse soft tissue edema and enhancement which likely represents cellulitis and myositis.      Assessment & Plan   Encounter Diagnoses:   Delirium    Displaced fracture of proximal phalanx of right little finger, initial encounter for open fracture    Diabetic foot infection (HCC)      S/P BKA POD # 1 Dr. Youngblood  PT/OT ordered     Case Management has been in contact with the family, plan for skilled nursing upon discharge. Discussed plan with patient and sister at Bedside for outpatient orthotist, stump , when stable.     Antibiotics per Infectious Disease.     Resume anti-coagulation cleared by Dr. Youngblood.     Discussed with Rn. No concerns for ortho at this time.

## 2025-07-10 NOTE — OR NURSING
1855 Assumed pt care from DEBRA Howell.     1900 Called sister, Charline with updates. Removed mask and placed pt on 3L NC.    1910 Medicated for pain. See MAR.     1922 Called daughter, Gabby. No answer. Left message to be in room approximately at 2000. Called Charline back with updates for medication for pain and monitoring until approximately 2000.    1937 Pt meets criteria for hospital room transfer. Called report to DEBRA Recio.     1959 Transferred pt back to hospital room.

## 2025-07-10 NOTE — PROGRESS NOTES
4 Eyes Skin Assessment Completed by DEBRA Recio and DEBRA Kat.    Skin assessment is primarily focused on high risk bony prominences. Pay special attention to skin beneath and around medical devices, high risk bony prominences, skin to skin areas and areas where the patient lacks sensation to feel pain and areas where the patient previously had breakdown.     Head (Occipital):  Abrasion and Scab   Ears (Under Medical Devices): WDL   Nose (Under Medical Devices): WDL   Mouth:  WDL   Neck: WDL   Breast/Chest:  WDL   Shoulder Blades:  WDL   Spine:   Abrasion   (R) Arm/Elbow/Hand: Pink, Blanching, and Bruising   (L) Arm/Elbow/Hand: Pink, Blanching, and Bruising   Abdomen: WDL   Pannus/Groin:  WDL   Sacrum/Coccyx:   Pink and Blanching   (R) Ischial Tuberosity (Sit Bones):  Pink and Blanching   (L) Ischial Tuberosity (Sit Bones):  Pink and Blanching   (R) Leg:  WDL   (L) Leg:  New BKA ace wrap   (R) Heel:  Pink and Blanching   (R) Foot/Toe: WDL   (L) Heel: WDL   (L) Foot/Toe:  BKA       DEVICES IN USE:   Respiratory Devices:  Nasal cannula and Pulse ox  Feeding Devices:  N/A   Lines & BP Monitoring Devices:  Peripheral IV and Pulse ox    Orthopedic Devices:  Ace wrap  Miscellaneous Devices:  Drains, Telemetry monitor, Ulta wound vac (hospital vac), and Condom cath    PROTOCOL INTERVENTIONS:   Low Airloss Bed:  Already in place  Offloading Dressing - Heel:  Already in place  Condom Cath/Purewick:  Already in place  Nasal Cannula with Gray Foams:  Already in place    WOUND PHOTOS:   Completed and in EPIC     WOUND CONSULT:   N/A, no advanced wound care needs identified. Wound has signed off for mid back abrasion and L foot.

## 2025-07-10 NOTE — ANESTHESIA TIME REPORT
Anesthesia Start and Stop Event Times       Date Time Event    7/9/2025 1502 Ready for Procedure     1548 Anesthesia Start     1815 Anesthesia Stop          Responsible Staff  07/09/25      Name Role Begin End    Dian Lopes M.D. Anesth 1548 1919          Overtime Reason:  no overtime (within assigned shift)    Comments:

## 2025-07-11 LAB
ANION GAP SERPL CALC-SCNC: 8 MMOL/L (ref 7–16)
BUN SERPL-MCNC: 18 MG/DL (ref 8–22)
CALCIUM SERPL-MCNC: 8.2 MG/DL (ref 8.5–10.5)
CHLORIDE SERPL-SCNC: 101 MMOL/L (ref 96–112)
CO2 SERPL-SCNC: 25 MMOL/L (ref 20–33)
CREAT SERPL-MCNC: 0.47 MG/DL (ref 0.5–1.4)
ERYTHROCYTE [DISTWIDTH] IN BLOOD BY AUTOMATED COUNT: 41.9 FL (ref 35.9–50)
GFR SERPLBLD CREATININE-BSD FMLA CKD-EPI: 112 ML/MIN/1.73 M 2
GLUCOSE BLD STRIP.AUTO-MCNC: 186 MG/DL (ref 65–99)
GLUCOSE BLD STRIP.AUTO-MCNC: 193 MG/DL (ref 65–99)
GLUCOSE BLD STRIP.AUTO-MCNC: 199 MG/DL (ref 65–99)
GLUCOSE BLD STRIP.AUTO-MCNC: 245 MG/DL (ref 65–99)
GLUCOSE SERPL-MCNC: 250 MG/DL (ref 65–99)
HCT VFR BLD AUTO: 36.4 % (ref 42–52)
HGB BLD-MCNC: 12 G/DL (ref 14–18)
MCH RBC QN AUTO: 27.7 PG (ref 27–33)
MCHC RBC AUTO-ENTMCNC: 33 G/DL (ref 32.3–36.5)
MCV RBC AUTO: 84.1 FL (ref 81.4–97.8)
PLATELET # BLD AUTO: 249 K/UL (ref 164–446)
PMV BLD AUTO: 10.6 FL (ref 9–12.9)
POTASSIUM SERPL-SCNC: 3.9 MMOL/L (ref 3.6–5.5)
RBC # BLD AUTO: 4.33 M/UL (ref 4.7–6.1)
SODIUM SERPL-SCNC: 134 MMOL/L (ref 135–145)
WBC # BLD AUTO: 18.7 K/UL (ref 4.8–10.8)

## 2025-07-11 PROCEDURE — 99232 SBSQ HOSP IP/OBS MODERATE 35: CPT | Performed by: INTERNAL MEDICINE

## 2025-07-11 PROCEDURE — A9270 NON-COVERED ITEM OR SERVICE: HCPCS | Performed by: INTERNAL MEDICINE

## 2025-07-11 PROCEDURE — 80048 BASIC METABOLIC PNL TOTAL CA: CPT

## 2025-07-11 PROCEDURE — 99233 SBSQ HOSP IP/OBS HIGH 50: CPT | Performed by: INTERNAL MEDICINE

## 2025-07-11 PROCEDURE — 700111 HCHG RX REV CODE 636 W/ 250 OVERRIDE (IP): Performed by: INTERNAL MEDICINE

## 2025-07-11 PROCEDURE — 700105 HCHG RX REV CODE 258: Performed by: STUDENT IN AN ORGANIZED HEALTH CARE EDUCATION/TRAINING PROGRAM

## 2025-07-11 PROCEDURE — 82962 GLUCOSE BLOOD TEST: CPT | Performed by: INTERNAL MEDICINE

## 2025-07-11 PROCEDURE — 700102 HCHG RX REV CODE 250 W/ 637 OVERRIDE(OP): Performed by: INTERNAL MEDICINE

## 2025-07-11 PROCEDURE — 36415 COLL VENOUS BLD VENIPUNCTURE: CPT

## 2025-07-11 PROCEDURE — 770001 HCHG ROOM/CARE - MED/SURG/GYN PRIV*

## 2025-07-11 PROCEDURE — 700111 HCHG RX REV CODE 636 W/ 250 OVERRIDE (IP): Mod: JZ | Performed by: STUDENT IN AN ORGANIZED HEALTH CARE EDUCATION/TRAINING PROGRAM

## 2025-07-11 PROCEDURE — 94760 N-INVAS EAR/PLS OXIMETRY 1: CPT

## 2025-07-11 PROCEDURE — 85027 COMPLETE CBC AUTOMATED: CPT

## 2025-07-11 RX ORDER — AMOXICILLIN 250 MG
2 CAPSULE ORAL EVERY EVENING
Status: DISCONTINUED | OUTPATIENT
Start: 2025-07-11 | End: 2025-07-18 | Stop reason: HOSPADM

## 2025-07-11 RX ORDER — POLYETHYLENE GLYCOL 3350 17 G/17G
1 POWDER, FOR SOLUTION ORAL
Status: DISCONTINUED | OUTPATIENT
Start: 2025-07-11 | End: 2025-07-18 | Stop reason: HOSPADM

## 2025-07-11 RX ADMIN — SENNOSIDES AND DOCUSATE SODIUM 2 TABLET: 50; 8.6 TABLET ORAL at 17:38

## 2025-07-11 RX ADMIN — INSULIN LISPRO 3 UNITS: 100 INJECTION, SOLUTION INTRAVENOUS; SUBCUTANEOUS at 11:47

## 2025-07-11 RX ADMIN — OXYCODONE 5 MG: 5 TABLET ORAL at 02:08

## 2025-07-11 RX ADMIN — OXYCODONE 5 MG: 5 TABLET ORAL at 13:16

## 2025-07-11 RX ADMIN — INSULIN LISPRO 3 UNITS: 100 INJECTION, SOLUTION INTRAVENOUS; SUBCUTANEOUS at 17:39

## 2025-07-11 RX ADMIN — HEPARIN SODIUM 5000 UNITS: 5000 INJECTION, SOLUTION INTRAVENOUS; SUBCUTANEOUS at 13:17

## 2025-07-11 RX ADMIN — INSULIN GLARGINE-YFGN 15 UNITS: 100 INJECTION, SOLUTION SUBCUTANEOUS at 17:39

## 2025-07-11 RX ADMIN — OXYCODONE 5 MG: 5 TABLET ORAL at 06:11

## 2025-07-11 RX ADMIN — BACLOFEN 10 MG: 10 TABLET ORAL at 06:11

## 2025-07-11 RX ADMIN — AMOXICILLIN AND CLAVULANATE POTASSIUM 1 TABLET: 875; 125 TABLET, FILM COATED ORAL at 17:39

## 2025-07-11 RX ADMIN — CEFEPIME 2 G: 2 INJECTION, POWDER, FOR SOLUTION INTRAVENOUS at 06:15

## 2025-07-11 RX ADMIN — INSULIN LISPRO 3 UNITS: 100 INJECTION, SOLUTION INTRAVENOUS; SUBCUTANEOUS at 06:17

## 2025-07-11 RX ADMIN — POLYETHYLENE GLYCOL 3350 1 PACKET: 17 POWDER, FOR SOLUTION ORAL at 17:38

## 2025-07-11 RX ADMIN — BACLOFEN 10 MG: 10 TABLET ORAL at 13:16

## 2025-07-11 RX ADMIN — INSULIN LISPRO 4 UNITS: 100 INJECTION, SOLUTION INTRAVENOUS; SUBCUTANEOUS at 00:43

## 2025-07-11 RX ADMIN — OXYCODONE 5 MG: 5 TABLET ORAL at 20:08

## 2025-07-11 ASSESSMENT — ENCOUNTER SYMPTOMS
HEARTBURN: 0
HEADACHES: 0
COUGH: 0
DIZZINESS: 0
WEAKNESS: 0
SPEECH CHANGE: 0
MYALGIAS: 1
CONSTIPATION: 0
ABDOMINAL PAIN: 0
CHILLS: 0
BLURRED VISION: 0
SHORTNESS OF BREATH: 0
DIARRHEA: 0
SENSORY CHANGE: 0
FEVER: 0
INSOMNIA: 0
VOMITING: 0
NERVOUS/ANXIOUS: 0
DEPRESSION: 0
CLAUDICATION: 0
PHOTOPHOBIA: 0

## 2025-07-11 ASSESSMENT — PAIN DESCRIPTION - PAIN TYPE
TYPE: ACUTE PAIN;SURGICAL PAIN
TYPE: ACUTE PAIN
TYPE: ACUTE PAIN;SURGICAL PAIN
TYPE: ACUTE PAIN;SURGICAL PAIN
TYPE: ACUTE PAIN

## 2025-07-11 ASSESSMENT — FIBROSIS 4 INDEX: FIB4 SCORE: 2

## 2025-07-11 NOTE — PROGRESS NOTES
[facility] Medicine Daily Progress Note    Date of Service  7/11/2025    Chief Complaint  Jimmy Amaya is a 70 y.o. male admitted 7/7/2025 with infected left diabetic foot wound and AMS.    Hospital Course  Patient is a 70-year-old male with chronic diabetes uncontrolled with minimal medical compliance lives alone in a trailer.  He drove himself to the hospital in [place] secondary to the wound on his foot and while in [place] was told that he needed to be flown to [place] for amputation and at that point he signed himself out AGAINST MEDICAL ADVICE and apparently got in his car and drove away.   was called by the hospital for a wellness check and patient was found with his car parked on the side of the freeway and was wandering in the middle of the freeway in [place] when he was picked back up again.  He was taken back to the hospital and then sent here for further evaluation.  The wounds on his foot are covered with maggots which are draining from the abscess.  Initially he was admitted to the intensive care unit and started on aggressive IV antibiotics.  Patient downgraded from the intensive care unit to the telemetry unit and orthopedic surgery consulted.  Patient has osteomyelitis in the 5th and 4th metatarsal on MRI in addition to the severe macerated skin and muscle on the left lateral border.  Patient to be taken to the OR for amputation below the knee.    Interval Problem Update  7/9 patient seen for the first time today after downgrade from the ICU.  He is aware that he has an infection in his left foot and is aware that he needs amputation.  Orthopedic surgery saw the patient and will be taking him for a transtibial amputation.  7/10 patient seems to be slightly more coherent today however this is in between sessions where he forgets where he is and yanks out his IV.  He will be without IV access until his next dose of antibiotics.  Infectious disease to consult given the continued infection despite  amputation of the foot.  ONUR actually showed good blood flow.  WBC count elevated secondary to surgical intervention yesterday.  7/11 patient seems a little bit more oriented today than previous days.  WBC count is trending down.  ID recommending Augmentin x 2 weeks for treatment of infection.  He will need continued wound care and eventually assistance with prosthesis.      I have discussed this patient's plan of care and discharge plan at IDT rounds today with Case Management, Nursing, Nursing leadership, and other members of the IDT team.    Consultants/Specialty  orthopedics    Code Status  Full Code    Disposition  The patient is not medically cleared for discharge to home or a post-acute facility.  Anticipate discharge to: skilled nursing facility    I have placed the appropriate orders for post-discharge needs.    Review of Systems  Review of Systems   Constitutional:  Negative for chills and fever.   HENT:  Negative for congestion.    Eyes:  Negative for blurred vision and photophobia.   Respiratory:  Negative for cough and shortness of breath.    Cardiovascular:  Negative for chest pain, claudication and leg swelling.   Gastrointestinal:  Negative for abdominal pain, constipation, diarrhea, heartburn and vomiting.   Genitourinary:  Negative for dysuria and hematuria.   Musculoskeletal:  Positive for myalgias. Negative for joint pain.   Skin:  Negative for itching and rash.   Neurological:  Negative for dizziness, sensory change, speech change, weakness and headaches.   Psychiatric/Behavioral:  Negative for depression. The patient is not nervous/anxious and does not have insomnia.         Physical Exam  Temp:  [36.3 °C (97.3 °F)-37 °C (98.6 °F)] 36.4 °C (97.5 °F)  Pulse:  [] 88  Resp:  [16-20] 16  BP: (119-134)/(67-88) 119/67  SpO2:  [83 %-95 %] 95 %    Physical Exam  Vitals and nursing note reviewed.   Constitutional:       General: He is not in acute distress.     Appearance: Normal appearance.    HENT:      Head: Normocephalic and atraumatic.   Eyes:      General: No scleral icterus.     Extraocular Movements: Extraocular movements intact.   Cardiovascular:      Rate and Rhythm: Normal rate and regular rhythm.      Pulses: Normal pulses.      Heart sounds: Normal heart sounds. No murmur heard.  Pulmonary:      Effort: Pulmonary effort is normal. No respiratory distress.      Breath sounds: Normal breath sounds. No wheezing, rhonchi or rales.   Abdominal:      General: Abdomen is flat. Bowel sounds are normal. There is no distension.      Palpations: Abdomen is soft.      Tenderness: There is no rebound.   Musculoskeletal:         General: No swelling or tenderness.      Cervical back: Normal range of motion and neck supple.      Comments: Left below the knee amputation in place, postoperative dressing in place, wound incisional VAC in place   Lymphadenopathy:      Cervical: No cervical adenopathy.   Skin:     Coloration: Skin is not jaundiced.      Findings: No erythema.   Neurological:      General: No focal deficit present.      Mental Status: He is alert and oriented to person, place, and time. Mental status is at baseline.      Cranial Nerves: No cranial nerve deficit.   Psychiatric:         Mood and Affect: Mood normal.         Behavior: Behavior normal.         Fluids    Intake/Output Summary (Last 24 hours) at 7/11/2025 1411  Last data filed at 7/11/2025 0615  Gross per 24 hour   Intake 800 ml   Output 1710 ml   Net -910 ml        Laboratory  Recent Labs     07/09/25 0338 07/10/25  0242 07/11/25  0044   WBC 20.7* 24.9* 18.7*   RBC 4.66* 4.58* 4.33*   HEMOGLOBIN 12.9* 12.7* 12.0*   HEMATOCRIT 41.6* 39.4* 36.4*   MCV 89.3 86.0 84.1   MCH 27.7 27.7 27.7   MCHC 31.0* 32.2* 33.0   RDW 45.8 44.2 41.9   PLATELETCT 212 230 249   MPV 10.4 10.6 10.6     Recent Labs     07/09/25  0338 07/10/25  0242 07/11/25  0044   SODIUM 136 137 134*   POTASSIUM 4.3 4.4 3.9   CHLORIDE 104 104 101   CO2 19* 22 25   GLUCOSE  173* 286* 250*   BUN 23* 20 18   CREATININE 0.65 0.54 0.47*   CALCIUM 8.4* 8.4* 8.2*                   Imaging  US-ONUR SINGLE LEVEL BILAT   Final Result      MR-FOOT-WITH & W/O LEFT   Final Result      1.  There is a large area of necrotic tissue/abscess in the lateral aspect of the forefoot.   2.  Osteomyelitis with necrosis versus intraosseous gas of the fifth metatarsal and the proximal and middle phalanges.   3.  There is also abnormal signal within the fourth metatarsal and proximal phalanx which may be reactive although additional infection cannot be excluded.   4.  There is diffuse soft tissue edema and enhancement which likely represents cellulitis and myositis.      DX-CHEST-PORTABLE (1 VIEW)   Final Result      Elevation of the right hemidiaphragm. Patchy right basilar opacities, atelectasis or infection.           Assessment/Plan  * Diabetic foot infection (HCC)- (present on admission)  Assessment & Plan  Hx of prior wounds on the foot now worse, XR showing osteo, 4/4 SIRS given sepsis bolus at OSH not hypotensive  Continue with cefepime  Purulent track age at the anterior portion of his leg discovered during BKA, area of purulence was washed extensively and cultures obtained and wound incision VAC in place.  Appreciated ID consultation, Augmentin x 2 weeks      Hiccups  Assessment & Plan  Baclofen PRN        Acute hypoxic respiratory failure (HCC)- (present on admission)  Assessment & Plan  Resolved, on room air      Self neglect- (present on admission)  Assessment & Plan  Patient currently does not have decision making capacity, daughter is NOK for consents  Capacity eval done by psychiatry    Sepsis (HCC)- (present on admission)  Assessment & Plan  This is Sepsis Present on admission  SIRS criteria identified on my evaluation include: Fever, with temperature greater than 100.9 deg F, Tachycardia, with heart rate greater than 90 BPM, Tachypnea, with respirations greater than 20 per minute, and  Leukocytosis, with WBC greater than 12,000  Clinical indicators of end organ dysfunction include Lactic Acid greater than 2  Source is osteo L foot  Sepsis protocol initiated  Crystalloid Fluid Administration: Fluid resuscitation ordered per standard protocol - 30 mL/kg per current or ideal body weight at OSH given  IV antibiotics as appropriate for source of sepsis  Reassessment: I have reassessed the patient's hemodynamic status      Altered mental state  Assessment & Plan  Exacerbated by his infxn, concern for baseline mentation decline per sister, house is full of garbage and pt had maggots in his wound found wandering highway  -discussed with daughter, had memory issues in the past and difficulty caring for self but not the severe change that occurred in last week  -Will get MRI brain to r/o stroke once infection/leg in dealt with, discussed with patient today currently he does not want an MRI we will address later in the hospital stay  - Times when he is coherent and other times where he is completely confused, still not appropriate capacity to make medical decisions  - Frequent orientation      Type 2 diabetes mellitus with circulatory disorder, without long-term current use of insulin (HCC)- (present on admission)  Assessment & Plan  ISS with PO intake  Diabetic diet after surgery      Type 2 diabetes mellitus with diabetic polyneuropathy (HCC)- (present on admission)  Assessment & Plan  Uncontrolled due to noncompliance, no sign of DKA/HOS  -15 u of Lantus and SSI  -Accuchecks and hypoglycemia protocol         VTE prophylaxis:    heparin ppx      I have performed a physical exam and reviewed and updated ROS and Plan today (7/11/2025). In review of yesterday's note (7/10/2025), there are no changes except as documented above.    My total time spent caring for the patient on the day of the encounter was 53 minutes.   This does not include time spent on separately billable procedures/tests.

## 2025-07-11 NOTE — CARE PLAN
The patient is Stable - Low risk of patient condition declining or worsening    Shift Goals  Clinical Goals: Pain management, patient safety  Patient Goals: Sleep, comfort  Family Goals: lydia    Progress made toward(s) clinical / shift goals:  Patient updated on POC throughout shift. Drain output monitored throughout shift. Patient remained free from falls throughout shift. Patient refused wearing SCD, mepilex on heels or elbows. Patient educated on mepilex and SCD. Patient educated on turning self in bed and demonstrated turning self in bed to offload bony prominences.     Problem: Knowledge Deficit - Standard  Goal: Patient and family/care givers will demonstrate understanding of plan of care, disease process/condition, diagnostic tests and medications  Outcome: Progressing     Problem: Skin Integrity  Goal: Skin integrity is maintained or improved  Outcome: Progressing     Problem: Fall Risk  Goal: Patient will remain free from falls  Outcome: Progressing     Problem: Pain - Standard  Goal: Alleviation of pain or a reduction in pain to the patient’s comfort goal  Outcome: Progressing     Patient is not progressing towards the following goals:

## 2025-07-11 NOTE — PROGRESS NOTES
Infectious Disease Progress Note    Author: Gabyb Monaco M.D. Date & Time of service: 2025  1:55 PM    Chief Complaint:  necrotic foot    Interval History:   70 y.o. male admitted 2025 for necrotic left foot wound with maggots   AF WBC decreased to 18.7 refusing skin assessment noted  Labs Reviewed and Medications Reviewed.    Review of Systems:  Review of Systems   Unable to perform ROS: Psychiatric disorder   Constitutional:  Negative for fever.       Hemodynamics:  Temp (24hrs), Av.7 °C (98 °F), Min:36.3 °C (97.3 °F), Max:37 °C (98.6 °F)  Temperature: 36.4 °C (97.5 °F)  Pulse  Av.1  Min: 76  Max: 110   Blood Pressure : 119/67       Physical Exam:  Physical Exam  Constitutional:       General: He is not in acute distress.     Appearance: He is not diaphoretic.   Cardiovascular:      Rate and Rhythm: Normal rate.   Pulmonary:      Effort: Pulmonary effort is normal.   Musculoskeletal:      Comments: BKA         Meds:    Current Facility-Administered Medications:     senna-docusate **AND** polyethylene glycol/lytes    amoxicillin-clavulanate    baclofen    oxyCODONE immediate-release **OR** oxyCODONE immediate-release **OR** HYDROmorphone    insulin GLARGINE    insulin lispro **AND** POC blood glucose manual result **AND** NOTIFY MD and PharmD **AND** Administer 20 grams of glucose (approximately 8 ounces of fruit juice) every 15 minutes PRN FSBG less than 70 mg/dL **AND** dextrose bolus    ondansetron    heparin    Labs:  Recent Labs     07/09/25  0338 07/10/25  0242 25  0044   WBC 20.7* 24.9* 18.7*   RBC 4.66* 4.58* 4.33*   HEMOGLOBIN 12.9* 12.7* 12.0*   HEMATOCRIT 41.6* 39.4* 36.4*   MCV 89.3 86.0 84.1   MCH 27.7 27.7 27.7   RDW 45.8 44.2 41.9   PLATELETCT 212 230 249   MPV 10.4 10.6 10.6     Recent Labs     07/09/25  0338 07/10/25  0242 25  0044   SODIUM 136 137 134*   POTASSIUM 4.3 4.4 3.9   CHLORIDE 104 104 101   CO2 19* 22 25   GLUCOSE 173* 286* 250*   BUN 23* 20 18      Recent Labs     25  0338 07/10/25  0242 25  0044   ALBUMIN 2.6* 2.3*  --    TBILIRUBIN 1.1 1.0  --    ALKPHOSPHAT 113* 119*  --    TOTPROTEIN 6.0 6.2  --    ALTSGPT 20 26  --    ASTSGOT 32 34  --    CREATININE 0.65 0.54 0.47*       Imaging:  US-ONUR SINGLE LEVEL BILAT  Result Date: 2025   Vascular Laboratory  Conclusions  BILATERAL LOWER EXTREMITY ARTERIES:.  The ankle-brachial indices are normal.  ALISSA NOEL  Age:    70    Gender:     M  MRN:    [id number]  :    1955      BSA:  Exam Date:     2025 13:37  Room #:     Inpatient  Priority:     Routine  Ht (in):             Wt (lb):  Ordering Physician:        GEORGI SOLORIO  Referring Physician:       [id number] Serrato  Sonographer:               Danial Nuñez RVOTTONIEL  Study Type:                Complete Bilateral  Technical Quality:         Good  Indications:     Type 2 diabetes mellitus with foot ulcer  CPT Codes:       07329  ICD Codes:       E11.621  History:         diabetic left lateral oozing foot wound; maggots;                   osteomyelitis; sepsis; prior exam 23  Limitations:                 RIGHT  Waveform            Systolic BPs (mmHg)                             128           Brachial  Triphasic                                Common Femoral  Triphasic                                Popliteal  Triphasic                  166           Posterior Tibial  Triphasic                  138           Dorsalis Pedis                                           Digit                             1.30          ONUR                                           TBI                       LEFT  Waveform        Systolic BPs (mmHg)                             128           Brachial  Triphasic                                Common Femoral  Triphasic                                Popliteal  Triphasic                  140           Posterior Tibial  Triphasic                  136           Dorsalis Pedis                                            Digit                             1.09          ONUR                                           TBI  Findings  BILATERAL LOWER EXTREMITY ARTERIES:  Doppler waveforms of the common femoral artery and popliteal artery are  high amplitude and triphasic.  Doppler waveforms at the ankle are brisk and triphasic.  The ankle-brachial indices are normal.  There is no evidence of significant arterial disease demonstrated on the  right or the left.  Additional testing was not performed in accordance with lower extremity  arterial evaluation protocol.  Benjamín Bhatia MD  (Electronically Signed)  Final Date:      09 July 2025                   21:33    MR-FOOT-WITH & W/O LEFT  Result Date: 7/9/2025 7/8/2025 4:23 PM HISTORY/REASON FOR EXAM:  osteo. Skin ulcer TECHNIQUE/EXAM DESCRIPTION: MRI of the LEFT foot with and without contrast. The study was performed on a [employer] 1.5 Yessica MRI scanner. T1 axial, T1 sagittal, T1 coronal, STIR sagittal, T2 fast spin-echo fat-suppressed coronal, sagittal inversion recovery, and T1 postcontrast coronal images were obtained. 20 mL ProHance contrast was administered intravenously. COMPARISON: 1/3/2023 FINDINGS: There is reticulated low T1 and high T2-weighted signal intensity involving the subcutaneous tissues. This is consistent with edema and may be seen with cellulitis. There is a large gas and fluid collection in the dorsal aspect of the forefoot laterally consistent with abscess. This measures approximately 7.1 cm in transverse dimension, 2.7 cm in dorsal plantar dimension, and 10.9 cm in longitudinal dimension. This wraps around the lateral aspect of the fifth metatarsal and also appears to extend between the fourth and fifth metatarsal bases. Fluid tracks proximally plantar to the fourth metatarsal to the level of the tarsometatarsal joint. There appears to be a wound or ulcer plantar to the fifth metatarsal head. There is markedly abnormal signal within the fifth metatarsal as  well as the proximal and middle phalanges. There is high T2-weighted signal and enhancement with multiple areas of low signal. The low signal may represent intraosseous gas or necrotic bone. There is also some abnormal signal within the fourth metatarsal and proximal phalanx. This may be reactive although additional infection cannot be excluded. There is hallux valgus deformity and first metatarsophalangeal joint osteoarthrosis. There is diffuse T2 hyperintensity and enhancement of the intrinsic foot musculature. This is nonspecific and may be seen with myositis, ischemia, and denervation. There is at least a component of degeneration given fatty infiltration noted on the T1-weighted sequences.     1.  There is a large area of necrotic tissue/abscess in the lateral aspect of the forefoot. 2.  Osteomyelitis with necrosis versus intraosseous gas of the fifth metatarsal and the proximal and middle phalanges. 3.  There is also abnormal signal within the fourth metatarsal and proximal phalanx which may be reactive although additional infection cannot be excluded. 4.  There is diffuse soft tissue edema and enhancement which likely represents cellulitis and myositis.    DX-CHEST-PORTABLE (1 VIEW)  Result Date: 7/8/2025 7/8/2025 11:31 AM HISTORY/REASON FOR EXAM:  Cough Shortness of breath TECHNIQUE/EXAM DESCRIPTION AND NUMBER OF VIEWS: Single portable view of the chest. COMPARISON: None FINDINGS: Elevation of the right hemidiaphragm. Patchy right basilar opacities. No pleural effusion. No pneumothorax. Normal cardiopericardial silhouette.     Elevation of the right hemidiaphragm. Patchy right basilar opacities, atelectasis or infection.      Micro:  Results       Procedure Component Value Units Date/Time    Fungal Culture [[id number]] Collected: 07/09/25 1614    Order Status: Completed Specimen: Wound Updated: 07/11/25 1225     Significant Indicator NEG     Source WND     Site Left leg #1     Culture Result Culture in  progress.     Fungal Smear Results No fungal elements seen.    CULTURE WOUND W/ GRAM STAIN [[id number]]  (Abnormal) Collected: 07/09/25 1614    Order Status: Completed Specimen: Wound Updated: 07/11/25 1225     Significant Indicator POS     Source WND     Site Left leg #1     Culture Result -     Gram Stain Result Many WBCs.  Moderate Gram positive cocci.  Few Gram negative rods.       Culture Result Streptococcus constellatus  Heavy growth      Anaerobic Culture [[id number]] Collected: 07/09/25 1614    Order Status: Completed Specimen: Wound Updated: 07/11/25 1225     Significant Indicator NEG     Source WND     Site Left leg #1     Culture Result Culture in progress.    GRAM STAIN [[id number]] Collected: 07/09/25 1614    Order Status: Completed Specimen: Wound Updated: 07/10/25 1436     Significant Indicator .     Source WND     Site Left leg #1     Gram Stain Result Many WBCs.  Moderate Gram positive cocci.  Few Gram negative rods.      Fungal Smear [[id number]] Collected: 07/09/25 1614    Order Status: Completed Specimen: Wound Updated: 07/10/25 1436     Significant Indicator NEG     Source WND     Site Left leg #1     Fungal Smear Results No fungal elements seen.    AFB CULTURE AND STAIN (ACID-FAST BACILLUS) [[id number]] Collected: 07/09/25 1614    Order Status: Canceled     MRSA By PCR (Amp) [[id number]] Collected: 07/07/25 2327    Order Status: Completed Specimen: Respirate from Nares Updated: 07/08/25 1253     MRSA by PCR Negative    Parasite, Gross Identification [[id number]] Collected: 07/08/25 0600    Order Status: Completed Specimen: Other Updated: 07/08/25 1137     Significant Indicator NEG     Source OTHER     Site Gross ID     Parasite, Gross Identification Organism submitted does not resemble a known human parasite.    Narrative:      Per DEVIN in Micro, put as MISC and they would convert code.  Bug Identification            Assessment:  Active Hospital Problems    Diagnosis     *Diabetic foot infection  ([facility]) [E11.628, L08.9]     Hiccups [R06.6]     Altered mental state [R41.82]     Sepsis ([facility]) [A41.9]     Self neglect [R46.89]     Acute hypoxic respiratory failure ([facility]) [J96.01]     Type 2 diabetes mellitus with circulatory disorder, without long-term current use of insulin ([facility]) [E11.59]     Type 2 diabetes mellitus with diabetic polyneuropathy ([facility]) [E11.42]      Necrotic foot with maggots  -Purulence seen in fascia  -current on tetanus-last given 2023  Psych disorder  Noncompliance-has pulled out IV already  Leukocytosis, decreased     PLAN:   Purulence seen in anterior soft tissue-no necrosis stump per op note  Recommend revision BKA for definitive treatment if concern for residual bone infection  OK to transition to oral Augmentin 875 mg PO BID for 2 weeks post-op  Operative cultures +strep  Wound care    FU Ortho as scheduled  FU ID clinic prn  OK to see Ariela VANG  Will sign off-please reconsult if needed

## 2025-07-11 NOTE — PROGRESS NOTES
4 Eyes Skin Assessment Completed by DEBRA Recio and DEBRA Kat.    Skin assessment is primarily focused on high risk bony prominences. Pay special attention to skin beneath and around medical devices, high risk bony prominences, skin to skin areas and areas where the patient lacks sensation to feel pain and areas where the patient previously had breakdown.     Head (Occipital):  Abrasion   Ears (Under Medical Devices): WDL   Nose (Under Medical Devices): WDL   Mouth:  WDL   Neck: WDL   Breast/Chest:  WDL   Shoulder Blades:  WDL   Spine:   Abrasion   (R) Arm/Elbow/Hand: Pink, Blanching, and Bruising   (L) Arm/Elbow/Hand: Pink, Blanching, and Bruising   Abdomen: WDL   Pannus/Groin:  WDL   Sacrum/Coccyx:   Pink and Blanching   (R) Ischial Tuberosity (Sit Bones):  WDL   (L) Ischial Tuberosity (Sit Bones):  WDL   (R) Leg:  WDL   (L) Leg:  New BKA ace wrap MICHELINE   (R) Heel:  Pink and Blanching   (R) Foot/Toe: WDL   (L) Heel: WDL   (L) Foot/Toe:  BKA       DEVICES IN USE:   Respiratory Devices:  Nasal cannula and Pulse ox  Feeding Devices:  N/A   Lines & BP Monitoring Devices:  Peripheral IV and Pulse ox    Orthopedic Devices:  Ace wrap  Miscellaneous Devices:  Condom cath    PROTOCOL INTERVENTIONS:   Low [place]oss Bed:  Already in place  Float Heels with Pillows:  Reinforced/reapplied  Condom Cath/Purewick:  Already in place  Silicone Nasal Cannula Tubing:  Reinforced/reapplied    WOUND PHOTOS:   Completed and in EPIC     WOUND CONSULT:   N/A, no advanced wound care needs identified

## 2025-07-11 NOTE — PROGRESS NOTES
"Progress Note     No acute events.  Pain controlled overall.       /67   Pulse 88   Temp 36.4 °C (97.5 °F) (Temporal)   Resp 17   Ht 1.88 m (6' 2\")   Wt 113 kg (248 lb 7.3 oz)   SpO2 95%   BMI 31.90 kg/m²      Patient seen and examined  No acute distress  Breathing non labored  RRR  Stump dressing intact.  Drain with minimal output overight    Recent Labs     07/09/25  0338 07/10/25  0242 07/11/25  0044   WBC 20.7* 24.9* 18.7*   RBC 4.66* 4.58* 4.33*   HEMOGLOBIN 12.9* 12.7* 12.0*   HEMATOCRIT 41.6* 39.4* 36.4*   MCV 89.3 86.0 84.1   MCH 27.7 27.7 27.7   RDW 45.8 44.2 41.9   PLATELETCT 212 230 249   MPV 10.4 10.6 10.6     Recent Labs     07/09/25  0338 07/10/25  0242 07/11/25  0044   SODIUM 136 137 134*   POTASSIUM 4.3 4.4 3.9   CHLORIDE 104 104 101   CO2 19* 22 25   GLUCOSE 173* 286* 250*   BUN 23* 20 18        A/P: 70M s/p L BKA.       NWB LLE  Follow up intra-op cultures  Drain dc'd today  Abx per ID      Fu 2 weeks      Miguel Youngblood MD  [facility]      Problem List[1]         [1]   Patient Active Problem List  Diagnosis    Type 2 diabetes mellitus with diabetic polyneuropathy (HCC)    Obesity (BMI 30-39.9)    Elevated blood pressure reading in office without diagnosis of hypertension    Absent pedal pulses    Foot ulcer, left, with unspecified severity (HCC)    Sepsis due to methicillin resistant Staphylococcus aureus (MRSA) without acute organ dysfunction (HCC)    Type 2 diabetes mellitus with circulatory disorder, without long-term current use of insulin (HCC)    Hyponatremia    Hypokalemia    Elevated alkaline phosphatase level    Hypoalbuminemia    Diabetic ulcer of left midfoot associated with type 2 diabetes mellitus (HCC)    Laceration of extensor muscle, fascia and tendon of left little finger at wrist and hand level, initial encounter    Displaced fracture of proximal phalanx of right little finger, initial encounter for open fracture    Diabetic foot infection (HCC)    " Altered mental state    Sepsis (HCC)    Self neglect    Acute hypoxic respiratory failure (HCC)    Hiccups

## 2025-07-11 NOTE — PROGRESS NOTES
Pt tore out their IV and ripped off their wrist bands. MD aware and is ok with no IV access for the time being.

## 2025-07-12 LAB
ANION GAP SERPL CALC-SCNC: 9 MMOL/L (ref 7–16)
BACTERIA WND AEROBE CULT: ABNORMAL
BACTERIA WND AEROBE CULT: ABNORMAL
BUN SERPL-MCNC: 14 MG/DL (ref 8–22)
CALCIUM SERPL-MCNC: 8.4 MG/DL (ref 8.5–10.5)
CHLORIDE SERPL-SCNC: 100 MMOL/L (ref 96–112)
CO2 SERPL-SCNC: 25 MMOL/L (ref 20–33)
CREAT SERPL-MCNC: 0.47 MG/DL (ref 0.5–1.4)
ERYTHROCYTE [DISTWIDTH] IN BLOOD BY AUTOMATED COUNT: 43.2 FL (ref 35.9–50)
GFR SERPLBLD CREATININE-BSD FMLA CKD-EPI: 112 ML/MIN/1.73 M 2
GLUCOSE BLD STRIP.AUTO-MCNC: 161 MG/DL (ref 65–99)
GLUCOSE BLD STRIP.AUTO-MCNC: 179 MG/DL (ref 65–99)
GLUCOSE BLD STRIP.AUTO-MCNC: 191 MG/DL (ref 65–99)
GLUCOSE BLD STRIP.AUTO-MCNC: 222 MG/DL (ref 65–99)
GLUCOSE SERPL-MCNC: 183 MG/DL (ref 65–99)
GRAM STN SPEC: ABNORMAL
HCT VFR BLD AUTO: 39.6 % (ref 42–52)
HGB BLD-MCNC: 12.7 G/DL (ref 14–18)
MCH RBC QN AUTO: 27 PG (ref 27–33)
MCHC RBC AUTO-ENTMCNC: 32.1 G/DL (ref 32.3–36.5)
MCV RBC AUTO: 84.3 FL (ref 81.4–97.8)
PLATELET # BLD AUTO: 287 K/UL (ref 164–446)
PMV BLD AUTO: 10.6 FL (ref 9–12.9)
POTASSIUM SERPL-SCNC: 3.8 MMOL/L (ref 3.6–5.5)
RBC # BLD AUTO: 4.7 M/UL (ref 4.7–6.1)
SIGNIFICANT IND 70042: ABNORMAL
SITE SITE: ABNORMAL
SODIUM SERPL-SCNC: 134 MMOL/L (ref 135–145)
SOURCE SOURCE: ABNORMAL
WBC # BLD AUTO: 15.1 K/UL (ref 4.8–10.8)

## 2025-07-12 PROCEDURE — A9270 NON-COVERED ITEM OR SERVICE: HCPCS | Performed by: INTERNAL MEDICINE

## 2025-07-12 PROCEDURE — 82962 GLUCOSE BLOOD TEST: CPT | Performed by: INTERNAL MEDICINE

## 2025-07-12 PROCEDURE — 700111 HCHG RX REV CODE 636 W/ 250 OVERRIDE (IP): Performed by: INTERNAL MEDICINE

## 2025-07-12 PROCEDURE — 97535 SELF CARE MNGMENT TRAINING: CPT

## 2025-07-12 PROCEDURE — 97163 PT EVAL HIGH COMPLEX 45 MIN: CPT

## 2025-07-12 PROCEDURE — 80048 BASIC METABOLIC PNL TOTAL CA: CPT

## 2025-07-12 PROCEDURE — 700102 HCHG RX REV CODE 250 W/ 637 OVERRIDE(OP): Performed by: INTERNAL MEDICINE

## 2025-07-12 PROCEDURE — 94760 N-INVAS EAR/PLS OXIMETRY 1: CPT

## 2025-07-12 PROCEDURE — 770001 HCHG ROOM/CARE - MED/SURG/GYN PRIV*

## 2025-07-12 PROCEDURE — 36415 COLL VENOUS BLD VENIPUNCTURE: CPT

## 2025-07-12 PROCEDURE — 85027 COMPLETE CBC AUTOMATED: CPT

## 2025-07-12 PROCEDURE — 99232 SBSQ HOSP IP/OBS MODERATE 35: CPT | Performed by: INTERNAL MEDICINE

## 2025-07-12 PROCEDURE — 97167 OT EVAL HIGH COMPLEX 60 MIN: CPT

## 2025-07-12 RX ORDER — OXYCODONE HYDROCHLORIDE 5 MG/1
5 TABLET ORAL
Refills: 0 | Status: DISCONTINUED | OUTPATIENT
Start: 2025-07-12 | End: 2025-07-16

## 2025-07-12 RX ORDER — HYDROMORPHONE HYDROCHLORIDE 1 MG/ML
0.25 INJECTION, SOLUTION INTRAMUSCULAR; INTRAVENOUS; SUBCUTANEOUS
Status: DISCONTINUED | OUTPATIENT
Start: 2025-07-12 | End: 2025-07-16

## 2025-07-12 RX ORDER — ONDANSETRON 4 MG/1
4 TABLET, ORALLY DISINTEGRATING ORAL EVERY 4 HOURS PRN
Status: DISCONTINUED | OUTPATIENT
Start: 2025-07-12 | End: 2025-07-18 | Stop reason: HOSPADM

## 2025-07-12 RX ORDER — OXYCODONE HYDROCHLORIDE 10 MG/1
10 TABLET ORAL
Refills: 0 | Status: DISCONTINUED | OUTPATIENT
Start: 2025-07-12 | End: 2025-07-16

## 2025-07-12 RX ADMIN — OXYCODONE 5 MG: 5 TABLET ORAL at 00:52

## 2025-07-12 RX ADMIN — INSULIN LISPRO 3 UNITS: 100 INJECTION, SOLUTION INTRAVENOUS; SUBCUTANEOUS at 00:51

## 2025-07-12 RX ADMIN — ONDANSETRON 4 MG: 4 TABLET, ORALLY DISINTEGRATING ORAL at 11:20

## 2025-07-12 RX ADMIN — INSULIN GLARGINE-YFGN 15 UNITS: 100 INJECTION, SOLUTION SUBCUTANEOUS at 17:17

## 2025-07-12 RX ADMIN — HEPARIN SODIUM 5000 UNITS: 5000 INJECTION, SOLUTION INTRAVENOUS; SUBCUTANEOUS at 21:29

## 2025-07-12 RX ADMIN — AMOXICILLIN AND CLAVULANATE POTASSIUM 1 TABLET: 875; 125 TABLET, FILM COATED ORAL at 17:19

## 2025-07-12 RX ADMIN — OXYCODONE HYDROCHLORIDE 10 MG: 10 TABLET ORAL at 09:40

## 2025-07-12 RX ADMIN — HEPARIN SODIUM 5000 UNITS: 5000 INJECTION, SOLUTION INTRAVENOUS; SUBCUTANEOUS at 06:06

## 2025-07-12 RX ADMIN — SENNOSIDES AND DOCUSATE SODIUM 2 TABLET: 50; 8.6 TABLET ORAL at 17:19

## 2025-07-12 RX ADMIN — HEPARIN SODIUM 5000 UNITS: 5000 INJECTION, SOLUTION INTRAVENOUS; SUBCUTANEOUS at 14:52

## 2025-07-12 RX ADMIN — AMOXICILLIN AND CLAVULANATE POTASSIUM 1 TABLET: 875; 125 TABLET, FILM COATED ORAL at 06:06

## 2025-07-12 RX ADMIN — POLYETHYLENE GLYCOL 3350 1 PACKET: 17 POWDER, FOR SOLUTION ORAL at 09:40

## 2025-07-12 RX ADMIN — INSULIN LISPRO 3 UNITS: 100 INJECTION, SOLUTION INTRAVENOUS; SUBCUTANEOUS at 06:10

## 2025-07-12 RX ADMIN — INSULIN LISPRO 4 UNITS: 100 INJECTION, SOLUTION INTRAVENOUS; SUBCUTANEOUS at 17:17

## 2025-07-12 RX ADMIN — INSULIN LISPRO 3 UNITS: 100 INJECTION, SOLUTION INTRAVENOUS; SUBCUTANEOUS at 12:00

## 2025-07-12 RX ADMIN — OXYCODONE 5 MG: 5 TABLET ORAL at 06:17

## 2025-07-12 ASSESSMENT — PAIN DESCRIPTION - PAIN TYPE
TYPE: ACUTE PAIN;SURGICAL PAIN
TYPE: ACUTE PAIN
TYPE: ACUTE PAIN

## 2025-07-12 ASSESSMENT — ENCOUNTER SYMPTOMS
CLAUDICATION: 0
FEVER: 0
COUGH: 0
MYALGIAS: 1
DIARRHEA: 0
CHILLS: 0
PHOTOPHOBIA: 0
INSOMNIA: 0
ABDOMINAL PAIN: 0
NERVOUS/ANXIOUS: 0
HEARTBURN: 0
HEADACHES: 0
VOMITING: 0
SPEECH CHANGE: 0
BLURRED VISION: 0
WEAKNESS: 0
DIZZINESS: 0
DEPRESSION: 0
CONSTIPATION: 0
SENSORY CHANGE: 0
SHORTNESS OF BREATH: 0

## 2025-07-12 ASSESSMENT — COGNITIVE AND FUNCTIONAL STATUS - GENERAL
CLIMB 3 TO 5 STEPS WITH RAILING: TOTAL
STANDING UP FROM CHAIR USING ARMS: A LOT
MOBILITY SCORE: 13
HELP NEEDED FOR BATHING: A LOT
SUGGESTED CMS G CODE MODIFIER MOBILITY: CL
DAILY ACTIVITIY SCORE: 18
MOVING TO AND FROM BED TO CHAIR: A LITTLE
WALKING IN HOSPITAL ROOM: TOTAL
MOVING FROM LYING ON BACK TO SITTING ON SIDE OF FLAT BED: A LITTLE
DRESSING REGULAR LOWER BODY CLOTHING: A LOT
SUGGESTED CMS G CODE MODIFIER DAILY ACTIVITY: CK
TOILETING: A LOT
TURNING FROM BACK TO SIDE WHILE IN FLAT BAD: A LITTLE

## 2025-07-12 ASSESSMENT — ACTIVITIES OF DAILY LIVING (ADL): TOILETING: INDEPENDENT

## 2025-07-12 NOTE — PROGRESS NOTES
[facility] Daily Progress Note    Date of Service  7/12/2025    Chief Complaint  Jimmy Amaya is a 70 y.o. male admitted 7/7/2025 with infected left diabetic foot wound and AMS.    Hospital Course  Patient is a 70-year-old male with chronic diabetes uncontrolled with minimal medical compliance lives alone in a trailer.  He drove himself to the hospital in [place] secondary to the wound on his foot and while in [place] was told that he needed to be flown to [place] for amputation and at that point he signed himself out AGAINST MEDICAL ADVICE and apparently got in his car and drove away.   was called by the hospital for a wellness check and patient was found with his car parked on the side of the freeway and was wandering in the middle of the freeway in [place] when he was picked back up again.  He was taken back to the hospital and then sent here for further evaluation.  The wounds on his foot are covered with maggots which are draining from the abscess.  Initially he was admitted to the intensive care unit and started on aggressive IV antibiotics.  Patient downgraded from the intensive care unit to the telemetry unit and orthopedic surgery consulted.  Patient has osteomyelitis in the 5th and 4th metatarsal on MRI in addition to the severe macerated skin and muscle on the left lateral border.  Patient to be taken to the OR for amputation below the knee.    Interval Problem Update  7/9 patient seen for the first time today after downgrade from the ICU.  He is aware that he has an infection in his left foot and is aware that he needs amputation.  Orthopedic surgery saw the patient and will be taking him for a transtibial amputation.  7/10 patient seems to be slightly more coherent today however this is in between sessions where he forgets where he is and yanks out his IV.  He will be without IV access until his next dose of antibiotics.  Infectious disease to consult given the continued infection despite  amputation of the foot.  ONUR actually showed good blood flow.  WBC count elevated secondary to surgical intervention yesterday.  7/11 patient seems a little bit more oriented today than previous days.  WBC count is trending down.  ID recommending Augmentin x 2 weeks for treatment of infection.  He will need continued wound care and eventually assistance with prosthesis.  7/12 patient states he feels somewhat miserable today as the pain is under control.  He is on very low-dose oxycodone and though he is opiate naïve would benefit from increasing in the dose.  I will double the dose of oxycodone in effort to get his pain under better control.  PT OT evaluations pending and patient likely okay to go to skilled nursing either Monday or Tuesday depending on acceptance.    I have discussed this patient's plan of care and discharge plan at IDT rounds today with Case Management, Nursing, Nursing leadership, and other members of the IDT team.    Consultants/Specialty  orthopedics    Code Status  Full Code    Disposition  The patient is not medically cleared for discharge to home or a post-acute facility.  Anticipate discharge to: skilled nursing facility    I have placed the appropriate orders for post-discharge needs.    Review of Systems  Review of Systems   Constitutional:  Negative for chills and fever.   HENT:  Negative for congestion.    Eyes:  Negative for blurred vision and photophobia.   Respiratory:  Negative for cough and shortness of breath.    Cardiovascular:  Negative for chest pain, claudication and leg swelling.   Gastrointestinal:  Negative for abdominal pain, constipation, diarrhea, heartburn and vomiting.   Genitourinary:  Negative for dysuria and hematuria.   Musculoskeletal:  Positive for myalgias. Negative for joint pain.   Skin:  Negative for itching and rash.   Neurological:  Negative for dizziness, sensory change, speech change, weakness and headaches.   Psychiatric/Behavioral:  Negative for  depression. The patient is not nervous/anxious and does not have insomnia.         Physical Exam  Temp:  [36.4 °C (97.6 °F)-36.7 °C (98.1 °F)] 36.7 °C (98 °F)  Pulse:  [80-85] 85  Resp:  [16-17] 17  BP: (125-149)/(72-86) 125/77  SpO2:  [93 %-94 %] 94 %    Physical Exam  Vitals and nursing note reviewed.   Constitutional:       General: He is not in acute distress.     Appearance: Normal appearance.   HENT:      Head: Normocephalic and atraumatic.   Eyes:      General: No scleral icterus.     Extraocular Movements: Extraocular movements intact.   Cardiovascular:      Rate and Rhythm: Normal rate and regular rhythm.      Pulses: Normal pulses.      Heart sounds: Normal heart sounds. No murmur heard.  Pulmonary:      Effort: Pulmonary effort is normal. No respiratory distress.      Breath sounds: Normal breath sounds. No wheezing, rhonchi or rales.   Abdominal:      General: Abdomen is flat. Bowel sounds are normal. There is no distension.      Palpations: Abdomen is soft.      Tenderness: There is no rebound.   Musculoskeletal:         General: No swelling or tenderness.      Cervical back: Normal range of motion and neck supple.      Comments: Left below the knee amputation in place, postoperative dressing in place, wound incisional VAC in place, hemovac removed.   Lymphadenopathy:      Cervical: No cervical adenopathy.   Skin:     Coloration: Skin is not jaundiced.      Findings: No erythema.   Neurological:      General: No focal deficit present.      Mental Status: He is alert and oriented to person, place, and time. Mental status is at baseline.      Cranial Nerves: No cranial nerve deficit.   Psychiatric:         Mood and Affect: Mood normal.         Behavior: Behavior normal.         Fluids    Intake/Output Summary (Last 24 hours) at 7/12/2025 1432  Last data filed at 7/12/2025 0530  Gross per 24 hour   Intake --   Output 750 ml   Net -750 ml        Laboratory  Recent Labs     07/10/25  0242 07/11/25  0044  07/12/25  0041   WBC 24.9* 18.7* 15.1*   RBC 4.58* 4.33* 4.70   HEMOGLOBIN 12.7* 12.0* 12.7*   HEMATOCRIT 39.4* 36.4* 39.6*   MCV 86.0 84.1 84.3   MCH 27.7 27.7 27.0   MCHC 32.2* 33.0 32.1*   RDW 44.2 41.9 43.2   PLATELETCT 230 249 287   MPV 10.6 10.6 10.6     Recent Labs     07/10/25  0242 07/11/25  0044 07/12/25  0041   SODIUM 137 134* 134*   POTASSIUM 4.4 3.9 3.8   CHLORIDE 104 101 100   CO2 22 25 25   GLUCOSE 286* 250* 183*   BUN 20 18 14   CREATININE 0.54 0.47* 0.47*   CALCIUM 8.4* 8.2* 8.4*                   Imaging  US-ONUR SINGLE LEVEL BILAT   Final Result      MR-FOOT-WITH & W/O LEFT   Final Result      1.  There is a large area of necrotic tissue/abscess in the lateral aspect of the forefoot.   2.  Osteomyelitis with necrosis versus intraosseous gas of the fifth metatarsal and the proximal and middle phalanges.   3.  There is also abnormal signal within the fourth metatarsal and proximal phalanx which may be reactive although additional infection cannot be excluded.   4.  There is diffuse soft tissue edema and enhancement which likely represents cellulitis and myositis.      DX-CHEST-PORTABLE (1 VIEW)   Final Result      Elevation of the right hemidiaphragm. Patchy right basilar opacities, atelectasis or infection.           Assessment/Plan  * Diabetic foot infection (HCC)- (present on admission)  Assessment & Plan  Hx of prior wounds on the foot now worse, XR showing osteo, 4/4 SIRS given sepsis bolus at OSH not hypotensive  Continue with cefepime  Purulent track age at the anterior portion of his leg discovered during BKA, area of purulence was washed extensively and cultures obtained and wound incision VAC in place.  Appreciated ID consultation, Augmentin x 2 weeks      Hiccups  Assessment & Plan  Baclofen PRN, helping        Acute hypoxic respiratory failure (HCC)- (present on admission)  Assessment & Plan  Resolved, on room air      Self neglect- (present on admission)  Assessment & Plan  Patient  currently does not have decision making capacity, daughter is NOK for consents  Capacity eval done by psychiatry    Sepsis ([facility])- (present on admission)  Assessment & Plan  This is Sepsis Present on admission  SIRS criteria identified on my evaluation include: Fever, with temperature greater than 100.9 deg F, Tachycardia, with heart rate greater than 90 BPM, Tachypnea, with respirations greater than 20 per minute, and Leukocytosis, with WBC greater than 12,000  Clinical indicators of end organ dysfunction include Lactic Acid greater than 2  Source is osteo L foot  Sepsis protocol initiated  Crystalloid Fluid Administration: Fluid resuscitation ordered per standard protocol - 30 mL/kg per current or ideal body weight at OSH given  IV antibiotics as appropriate for source of sepsis  Reassessment: I have reassessed the patient's hemodynamic status      Altered mental state  Assessment & Plan  Exacerbated by his infxn, concern for baseline mentation decline per sister, house is full of garbage and pt had maggots in his wound found wandering highway  -discussed with daughter, had memory issues in the past and difficulty caring for self but not the severe change that occurred in last week  -Will get MRI brain to r/o stroke once infection/leg in dealt with, discussed with patient today currently he does not want an MRI we will address later in the hospital stay  - Times when he is coherent and other times where he is completely confused, still not appropriate capacity to make medical decisions  - Frequent orientation  - Begin weaning mentation cooperative with care most of the time, without IV access secondary to multiple pulled out IVs      Type 2 diabetes mellitus with circulatory disorder, without long-term current use of insulin ([facility])- (present on admission)  Assessment & Plan  ISS with PO intake  Diabetic diet after surgery      Type 2 diabetes mellitus with diabetic polyneuropathy ([facility])- (present on  admission)  Assessment & Plan  Uncontrolled due to noncompliance, no sign of DKA/HOS  -15 u of Lantus and SSI  -Accuchecks and hypoglycemia protocol         VTE prophylaxis:    heparin ppx      I have performed a physical exam and reviewed and updated ROS and Plan today (7/12/2025). In review of yesterday's note (7/11/2025), there are no changes except as documented above.    My total time spent caring for the patient on the day of the encounter was 53 minutes.   This does not include time spent on separately billable procedures/tests.

## 2025-07-12 NOTE — CARE PLAN
The patient is Stable - Low risk of patient condition declining or worsening    Shift Goals  Clinical Goals: Pain management, patient safety, BM, abx  Patient Goals: Comfort, sleep  Family Goals: lydia    Progress made toward(s) clinical / shift goals:  Patient updated on POC throughout shift. Patient remained free from falls throughout shift. New heel and sacral mepilexs applied during shift. Patient demonstrated turning self in bed to offloading bony prominences. Left leg dressing CDI throughout shift.     Problem: Knowledge Deficit - Standard  Goal: Patient and family/care givers will demonstrate understanding of plan of care, disease process/condition, diagnostic tests and medications  Outcome: Progressing     Problem: Skin Integrity  Goal: Skin integrity is maintained or improved  Outcome: Progressing     Problem: Fall Risk  Goal: Patient will remain free from falls  Outcome: Progressing     Problem: Pain - Standard  Goal: Alleviation of pain or a reduction in pain to the patient’s comfort goal  Outcome: Progressing     Patient is not progressing towards the following goals:

## 2025-07-12 NOTE — CARE PLAN
The patient is Stable - Low risk of patient condition declining or worsening    Shift Goals  Clinical Goals: pain management, safety, abx  Patient Goals: Comfort, sleep  Family Goals: lydia    Progress made toward(s) clinical / shift goals:    Problem: Knowledge Deficit - Standard  Goal: Patient and family/care givers will demonstrate understanding of plan of care, disease process/condition, diagnostic tests and medications  Outcome: Progressing     Problem: Skin Integrity  Goal: Skin integrity is maintained or improved  Outcome: Progressing     Problem: Fall Risk  Goal: Patient will remain free from falls  Outcome: Progressing     Problem: Pain - Standard  Goal: Alleviation of pain or a reduction in pain to the patient’s comfort goal  Outcome: Progressing       Patient is not progressing towards the following goals:

## 2025-07-12 NOTE — CARE PLAN
The patient is Stable - Low risk of patient condition declining or worsening    Shift Goals  Clinical Goals: Pain mgmt, hiccup mgmt, PT/OT eval  Patient Goals: Comfort  Family Goals: Updates    Progress made toward(s) clinical / shift goals:  Pt intermittently confused and impulsive. Hemovac removed. Pt up to chair and back to bed with stand pivot and fww; pt requiring extensive coaching during process. Pain managed with PRN Rx. Baclofen moderately effective for hiccups.     Patient is not progressing towards the following goals:      Problem: Knowledge Deficit - Standard  Goal: Patient and family/care givers will demonstrate understanding of plan of care, disease process/condition, diagnostic tests and medications  Outcome: Progressing      Standing/Walking

## 2025-07-12 NOTE — THERAPY
Physical Therapy   Initial Evaluation     Patient Name:  Jimmy Amaya  Age:  70 y.o., Sex:  male  Medical Record #:  [id number]  Today's Date: 7/12/2025     Precautions  Medical: Fall Risk  Orthopedic: Below the Knee Amputation  Surgical: Wound Vac    Assessment  Pt is a 70 y.o. male admitted 7/7/2025 with infected left diabetic foot wound and AMS.  Treated operatively with L BKA on 7/9. Pt agreeable to PT evaluation today and sister present throughout session. PT evaluation completed with occupational therapist due to need for two skilled therapists. Pt with intermittent confusion throughout with frequent verbal cuing for sequencing and safety with all mobility. Instructed pt on stand pivot transfer from EOB to recliner chair, pt able to take small hops to pivot with use of FWW with continual verbal cuing throughout. Educated pt on positioning of L LE to promote knee extension ROM. Instructed on performing quad sets as able. Pt left sitting in recliner chair at end of session with sister present. Pt not at baseline level of mobility at this time. Recommend post acute care following discharge.     Plan    Physical Therapy Initial Treatment Plan   Treatment Plan : Bed Mobility, Family / Caregiver Training, Equipment, Gait Training, Self Care / Home Evaluation, Therapeutic Activities, Therapeutic Exercise, Neuro Re-Education / Balance  Treatment Frequency: 5 Times per Week  Duration: Until Therapy Goals Met    DC Equipment Recommendations: Front-Wheel Walker, Wheelchair  Discharge Recommendations: Recommend post-acute placement for additional physical therapy services prior to discharge home       Subjective    Pt reports pain to L LE, but agreeable to PT evaluation.      Objective       07/12/25 1140   Initial Contact Note    Initial Contact Note Order Received and Verified, Physical Therapy Evaluation in Progress with Full Report to Follow.   Precautions   Medical Fall Risk   Orthopedic Below the Knee Amputation  "  Surgical Wound Vac   Vitals   O2 Delivery Device None - Room Air   Pain 0 - 10 Group   Therapist Pain Assessment Nurse Notified  (Pt reports pain is \"as high as it can get\", despite pre-medicating. Pt agreeable to participate.)   Prior Living Situation   Housing / Facility Mobile Home   Steps Into Home 3   Steps In Home 0   Lives with - Patient's Self Care Capacity Alone and Unable to Care For Self   Comments Sister reports that home is not in a liveable condition.   Prior Level of Functional Mobility   Bed Mobility Independent   Transfer Status Independent   Ambulation Independent   Ambulation Distance Community distances  (Pt reports he would take long walks with his dogs.)   Assistive Devices Used None   Stairs Independent   Comments Pt reports that he was working 5 hour shifts at [employer].   History of Falls   History of Falls Yes   Date of Last Fall   (History of recurrent falls - exact number unknown)   Cognition    Cognition / Consciousness   (Appears to have intermittent confusion)   Level of Consciousness Alert   Active ROM Lower Body    Active ROM Lower Body  X   Comments   (L knee lacking full knee extension PROM or AROM)   Strength Lower Body   Lower Body Strength    (R LE grossly 4-/5; DNT L LE due to pain)   Balance Assessment   Sitting Balance (Static) Fair   Sitting Balance (Dynamic) Fair -   Standing Balance (Static) Fair   Standing Balance (Dynamic) Poor   Weight Shift Sitting Good   Comments Standing balance with use of FWW   Bed Mobility    Supine to Sit Minimal Assist   Gait Analysis   Comments Unable secondary to post op L BKA   Functional Mobility   Sit to Stand Moderate Assist   Bed, Chair, Wheelchair Transfer   (Mod A x 2)   Transfer Method Stand Pivot  (Able to take small hops to turn with use of FWW)   Mobility Bed mobility, stand pivot EOB to recliner chair   Comments Continual verbal cuing throughout mobility for sequencing and positioning.   6 Clicks Assessment - How much HELP from " from another person do you currently need... (If the patient hasn't done an activity recently, how much help from another person do you think he/she would need if he/she tried?)   Turning from your back to your side while in a flat bed without using bedrails? 3   Moving from lying on your back to sitting on the side of a flat bed without using bedrails? 3   Moving to and from a bed to a chair (including a wheelchair)? 3   Standing up from a chair using your arms (e.g., wheelchair, or bedside chair)? 2   Walking in hospital room? 1   Climbing 3-5 steps with a railing? 1   6 clicks Mobility Score 13   Short Term Goals    Short Term Goal # 1 Pt will improve bed mobility to CGA.   Short Term Goal # 2 Pt will be able to complete sit to stand transfer safely with CGA with use of FWW.   Short Term Goal # 3 Pt will be able to complete bed to chair transfer with CGA with FWW.   Education Group   Education Provided Exercises - Supine;Role of Physical Therapist   Role of Physical Therapist Patient Response Patient;Family;Acceptance;Explanation;Verbal Demonstration   Exercises - Supine Patient Response Patient;Family;Acceptance;Explanation;Demonstration;Verbal Demonstration;Action Demonstration  (Instructed pt on R LE ankle pump, L LE quad set)   Physical Therapy Initial Treatment Plan    Treatment Plan  Bed Mobility;Family / Caregiver Training;Equipment;Gait Training;Self Care / Home Evaluation;Therapeutic Activities;Therapeutic Exercise;Neuro Re-Education / Balance   Treatment Frequency 5 Times per Week   Duration Until Therapy Goals Met   Problem List    Problems Pain;Impaired Bed Mobility;Impaired Transfers;Impaired Ambulation;Functional Strength Deficit;Impaired Balance;Safety Awareness Deficits / Cognition;Decreased Activity Tolerance;Motor Planning / Sequencing   Anticipated Discharge Equipment and Recommendations   DC Equipment Recommendations Front-Wheel Walker;Wheelchair   Discharge Recommendations Recommend  post-acute placement for additional physical therapy services prior to discharge home   Interdisciplinary Plan of Care Collaboration   IDT Collaboration with  Nursing   Patient Position at End of Therapy Seated;Chair Alarm On;Call Light within Reach;Family / Friend in Room;Tray Table within Reach;Phone within Reach   Collaboration Comments 7/12: PT leeroy. 5x/week.   Session Information   Date / Session Number  7/12, 1 (1/5, 7/18)

## 2025-07-12 NOTE — THERAPY
Occupational Therapy Contact Note    Patient Name: Jimmy Amaya  Age:  70 y.o., Sex:  male  Medical Record #: [id number]  Today's Date: 7/11/2025 07/11/25 1601   Interdisciplinary Plan of Care Collaboration   Collaboration Comments Received OT Eval order. Attempted, pt just BTB; requests to be seen tomorrow due to fatigue. Will complete as able.

## 2025-07-12 NOTE — DISCHARGE PLANNING
Case Management Discharge Planning    Admission Date: 7/7/2025  GMLOS: 9.6  ALOS: 5      Anticipated Discharge Dispo: Discharge Disposition: D/T to SNF with Medicare cert in anticipation of skilled care (03)    Pt not medically cleared yet. Pt has accepting SNF's, pending choice from pt's daughter.   Called pt's dtrGabby to f/u on choice. Gabby states she hasn't been able to research facilities yet but will this weekend. She requested f/u call on Monday morning.

## 2025-07-12 NOTE — THERAPY
Physical Therapy Contact Note    Patient Name: Jimmy Amaya  Age:  70 y.o., Sex:  male  Medical Record #: [id number]  Today's Date: 7/11/2025 7/11: PT orders received and chart review completed. Upon attempting PT evaluation, pt reports feeling very fatigued. Requests to defer PT at this time, would like to attempt at a later day.

## 2025-07-13 PROBLEM — K59.00 CONSTIPATION: Status: ACTIVE | Noted: 2025-07-13

## 2025-07-13 LAB
ANION GAP SERPL CALC-SCNC: 9 MMOL/L (ref 7–16)
BACTERIA SPEC ANAEROBE CULT: ABNORMAL
BACTERIA SPEC ANAEROBE CULT: ABNORMAL
BUN SERPL-MCNC: 13 MG/DL (ref 8–22)
CALCIUM SERPL-MCNC: 8.6 MG/DL (ref 8.5–10.5)
CHLORIDE SERPL-SCNC: 98 MMOL/L (ref 96–112)
CO2 SERPL-SCNC: 26 MMOL/L (ref 20–33)
CREAT SERPL-MCNC: 0.58 MG/DL (ref 0.5–1.4)
ERYTHROCYTE [DISTWIDTH] IN BLOOD BY AUTOMATED COUNT: 43 FL (ref 35.9–50)
GFR SERPLBLD CREATININE-BSD FMLA CKD-EPI: 105 ML/MIN/1.73 M 2
GLUCOSE BLD STRIP.AUTO-MCNC: 145 MG/DL (ref 65–99)
GLUCOSE BLD STRIP.AUTO-MCNC: 146 MG/DL (ref 65–99)
GLUCOSE BLD STRIP.AUTO-MCNC: 160 MG/DL (ref 65–99)
GLUCOSE BLD STRIP.AUTO-MCNC: 160 MG/DL (ref 65–99)
GLUCOSE BLD STRIP.AUTO-MCNC: 220 MG/DL (ref 65–99)
GLUCOSE SERPL-MCNC: 149 MG/DL (ref 65–99)
HCT VFR BLD AUTO: 41.8 % (ref 42–52)
HGB BLD-MCNC: 13.6 G/DL (ref 14–18)
MCH RBC QN AUTO: 27.8 PG (ref 27–33)
MCHC RBC AUTO-ENTMCNC: 32.5 G/DL (ref 32.3–36.5)
MCV RBC AUTO: 85.5 FL (ref 81.4–97.8)
PLATELET # BLD AUTO: 264 K/UL (ref 164–446)
PMV BLD AUTO: 10.7 FL (ref 9–12.9)
POTASSIUM SERPL-SCNC: 4.4 MMOL/L (ref 3.6–5.5)
RBC # BLD AUTO: 4.89 M/UL (ref 4.7–6.1)
SIGNIFICANT IND 70042: ABNORMAL
SITE SITE: ABNORMAL
SODIUM SERPL-SCNC: 133 MMOL/L (ref 135–145)
SOURCE SOURCE: ABNORMAL
WBC # BLD AUTO: 15.6 K/UL (ref 4.8–10.8)

## 2025-07-13 PROCEDURE — 770001 HCHG ROOM/CARE - MED/SURG/GYN PRIV*

## 2025-07-13 PROCEDURE — 36415 COLL VENOUS BLD VENIPUNCTURE: CPT

## 2025-07-13 PROCEDURE — 82962 GLUCOSE BLOOD TEST: CPT | Performed by: INTERNAL MEDICINE

## 2025-07-13 PROCEDURE — 700111 HCHG RX REV CODE 636 W/ 250 OVERRIDE (IP): Performed by: INTERNAL MEDICINE

## 2025-07-13 PROCEDURE — 700102 HCHG RX REV CODE 250 W/ 637 OVERRIDE(OP): Performed by: INTERNAL MEDICINE

## 2025-07-13 PROCEDURE — A9270 NON-COVERED ITEM OR SERVICE: HCPCS | Performed by: INTERNAL MEDICINE

## 2025-07-13 PROCEDURE — 80048 BASIC METABOLIC PNL TOTAL CA: CPT

## 2025-07-13 PROCEDURE — 94760 N-INVAS EAR/PLS OXIMETRY 1: CPT

## 2025-07-13 PROCEDURE — 85027 COMPLETE CBC AUTOMATED: CPT

## 2025-07-13 PROCEDURE — 99233 SBSQ HOSP IP/OBS HIGH 50: CPT | Performed by: INTERNAL MEDICINE

## 2025-07-13 RX ORDER — ACETAMINOPHEN 500 MG
1000 TABLET ORAL EVERY 6 HOURS
Status: DISCONTINUED | OUTPATIENT
Start: 2025-07-13 | End: 2025-07-18 | Stop reason: HOSPADM

## 2025-07-13 RX ADMIN — AMOXICILLIN AND CLAVULANATE POTASSIUM 1 TABLET: 875; 125 TABLET, FILM COATED ORAL at 17:40

## 2025-07-13 RX ADMIN — OXYCODONE HYDROCHLORIDE 10 MG: 10 TABLET ORAL at 20:51

## 2025-07-13 RX ADMIN — SENNOSIDES AND DOCUSATE SODIUM 2 TABLET: 50; 8.6 TABLET ORAL at 17:40

## 2025-07-13 RX ADMIN — ACETAMINOPHEN 1000 MG: 500 TABLET, FILM COATED ORAL at 23:50

## 2025-07-13 RX ADMIN — ACETAMINOPHEN 1000 MG: 500 TABLET, FILM COATED ORAL at 10:03

## 2025-07-13 RX ADMIN — INSULIN LISPRO 3 UNITS: 100 INJECTION, SOLUTION INTRAVENOUS; SUBCUTANEOUS at 00:29

## 2025-07-13 RX ADMIN — AMOXICILLIN AND CLAVULANATE POTASSIUM 1 TABLET: 875; 125 TABLET, FILM COATED ORAL at 05:25

## 2025-07-13 RX ADMIN — INSULIN GLARGINE-YFGN 15 UNITS: 100 INJECTION, SOLUTION SUBCUTANEOUS at 17:37

## 2025-07-13 RX ADMIN — INSULIN LISPRO 4 UNITS: 100 INJECTION, SOLUTION INTRAVENOUS; SUBCUTANEOUS at 17:36

## 2025-07-13 RX ADMIN — HEPARIN SODIUM 5000 UNITS: 5000 INJECTION, SOLUTION INTRAVENOUS; SUBCUTANEOUS at 13:30

## 2025-07-13 RX ADMIN — INSULIN LISPRO 3 UNITS: 100 INJECTION, SOLUTION INTRAVENOUS; SUBCUTANEOUS at 05:32

## 2025-07-13 RX ADMIN — ACETAMINOPHEN 1000 MG: 500 TABLET, FILM COATED ORAL at 17:40

## 2025-07-13 ASSESSMENT — PAIN DESCRIPTION - PAIN TYPE
TYPE: ACUTE PAIN

## 2025-07-13 ASSESSMENT — ENCOUNTER SYMPTOMS
INSOMNIA: 0
HEARTBURN: 0
MYALGIAS: 1
CHILLS: 0
BLURRED VISION: 0
VOMITING: 0
COUGH: 0
DIARRHEA: 0
PHOTOPHOBIA: 0
SPEECH CHANGE: 0
NERVOUS/ANXIOUS: 0
DEPRESSION: 0
WEAKNESS: 0
CLAUDICATION: 0
SENSORY CHANGE: 0
SHORTNESS OF BREATH: 0
CONSTIPATION: 0
DIZZINESS: 0
ABDOMINAL PAIN: 0
FEVER: 0
HEADACHES: 0

## 2025-07-13 NOTE — THERAPY
Occupational Therapy   Initial Evaluation     Patient Name:  Jimmy Amaya  Age:  70 y.o., Sex:  male  Medical Record #:  [id number]  Today's Date:  7/12/2025     Precautions  Medical: (P) Fall Risk  Orthopedic: (P) Below the Knee Amputation  Surgical: (P) Wound Vac  Weight Bearing: (P) Non Weight Bearing Left Lower Extremity    Assessment  Patient is 70 y.o. male admitted with diabetic foot infection. S/p L BKA, POD 3. Additional factors influencing patient status / progress: Pt live alone in [place], works part time at [employer]. Sister in [place], dtr in [place]. Pt is A&Ox3, intermittent mild confusion. Currently limited by LLE pain, impaired cognition/safety, impaired balance, decreased functional mobility and activity tolerance during ADL's. Tolerates transfer to chair with ModAx2, FWW, seated grooming//hygiene with encouragement, SBA. See below for CLOF, goals. OT will follow while in house, recommend post acute placement upon dc.          Plan  Occupational Therapy Initial Treatment Plan   Treatment Interventions: (P) Self Care / Activities of Daily Living, Neuro Re-Education / Balance, Therapeutic Activity, Therapeutic Exercises  Treatment Frequency: (P) 3 Times per Week  Duration: (P) Until Therapy Goals Met    DC Equipment Recommendations: (P) Unable to determine at this time  Discharge Recommendations: (P) Recommend post-acute placement for additional occupational therapy services prior to discharge home     Subjective  Agreeable.   Objective   07/12/25 1139   Prior Living Situation   Prior Services None   Housing / Facility Motor Home   Steps Into Home 3   Steps In Home 0   Equipment Owned None   Lives with - Patient's Self Care Capacity Alone and Unable to Care For Self   Comments Pt lives in [place]; had been working at [employer] 5 hrs/day. Sister lives in [place]. Dtr lives in [place].   Prior Level of ADL Function   Self Feeding Independent   Grooming / Hygiene Independent   Bathing Other (Comments)  (states he does not  shower; sponge bathes.)   Dressing Independent   Toileting Independent   Prior Level of IADL Function   Medication Management Independent   Laundry Independent   Kitchen Mobility Independent   Finances Independent   Home Management Independent   Shopping Independent   Prior Level Of Mobility Independent Without Device in Home   Driving / Transportation Unable To Determine At This Time   Occupation (Pre-Hospital Vocational) Employed Part Time   Leisure Interests Unable To Determine At This Time   History of Falls   History of Falls Yes   Precautions   Medical Fall Risk   Orthopedic Below the Knee Amputation   Surgical Wound Vac   Weight Bearing Non Weight Bearing Left Lower Extremity   Vitals   O2 Delivery Device None - Room Air   Pain 0 - 10 Group   Location Leg   Location Orientation Left   Therapist Pain Assessment Post Activity Pain Same as Prior to Activity;Nurse Notified   Cognition    Level of Consciousness Alert   Comments Ox3. Mild confusion.   Passive ROM Upper Body   Passive ROM Upper Body WDL   Active ROM Upper Body   Active ROM Upper Body  WDL   Strength Upper Body   Upper Body Strength  WDL   Sensation Upper Body   Upper Extremity Sensation  Not Tested   Upper Body Muscle Tone   Upper Body Muscle Tone  WDL   Coordination Upper Body   Coordination WDL   Balance Assessment   Sitting Balance (Static) Fair   Sitting Balance (Dynamic) Fair -   Standing Balance (Static) Fair   Standing Balance (Dynamic) Poor   Weight Shift Sitting Good   Bed Mobility    Supine to Sit Minimal Assist   ADL Assessment   Eating Independent   Grooming Standby Assist;Seated  (oral care, face wash, hair ... encouragement needed.)   Upper Body Dressing Supervision   Lower Body Dressing Maximal Assist  (R sock)   Comments condom cath in place   How much help from another person does the patient currently need...   Putting on and taking off regular lower body clothing? 2   Bathing (including washing, rinsing, and drying)? 2    Toileting, which includes using a toilet, bedpan, or urinal? 2   Putting on and taking off regular upper body clothing? 4   Taking care of personal grooming such as brushing teeth? 4   Eating meals? 4   6 Clicks Daily Activity Score 18   Functional Mobility   Sit to Stand Moderate Assist   Bed, Chair, Wheelchair Transfer Moderate Assist  (x2)   Transfer Method Stand Pivot   Comments FWW, able to pivot then hop on RLE for chair transfer   Visual Perception   Visual Perception  Not Tested   Activity Tolerance   Sitting in Chair 1 hr   Sitting Edge of Bed 10   Standing 2   Short Term Goals   Short Term Goal # 1 Pt will perform ADL transfer with Izzy within 5 days   Short Term Goal # 2 Pt will perform toileting at [facility] with Izzy within 5 days   Short Term Goal # 3 Pt will perform LB dressing with Izzy within 5 days   Education Group   Education Provided Transfers;Activities of Daily Living   Role of Occupational Therapist Patient Response Patient;Family;Acceptance;Explanation;Verbal Demonstration   Transfers Patient Response Patient;Family;Acceptance;Explanation;Action Demonstration   ADL Patient Response Patient;Family;Acceptance;Explanation;Action Demonstration   Additional Comments sister   Occupational Therapy Initial Treatment Plan    Treatment Interventions Self Care / Activities of Daily Living;Neuro Re-Education / Balance;Therapeutic Activity;Therapeutic Exercises   Treatment Frequency 3 Times per Week   Duration Until Therapy Goals Met   Problem List   Problem List Decreased Active Daily Living Skills;Decreased Homemaking Skills;Decreased Functional Mobility;Decreased Activity Tolerance;Safety Awareness Deficits / Cognition;Impaired Postural Control / Balance   Anticipated Discharge Equipment and Recommendations   DC Equipment Recommendations Unable to determine at this time   Discharge Recommendations Recommend post-acute placement for additional occupational therapy services prior to discharge home    Interdisciplinary Plan of Care Collaboration   IDT Collaboration with  Nursing;Family / Caregiver;Physical Therapist   Patient Position at End of Therapy Seated;Family / Friend in Room;Phone within Reach;Tray Table within Reach;Call Light within Reach   Collaboration Comments OT Deonte.

## 2025-07-13 NOTE — CARE PLAN
The patient is Stable - Low risk of patient condition declining or worsening    Shift Goals  Clinical Goals: monitor wound vac, pain management, PO abx  Patient Goals: sleep  Family Goals: lydia    Progress made toward(s) clinical / shift goals:        Problem: Knowledge Deficit - Standard  Goal: Patient and family/care givers will demonstrate understanding of plan of care, disease process/condition, diagnostic tests and medications  Description: Target End Date:  1-3 days or as soon as patient condition allows    Document in Patient Education    1.  Patient and family/caregiver oriented to unit, equipment, visitation policy and means for communicating concern  2.  Complete/review Learning Assessment  3.  Assess knowledge level of disease process/condition, treatment plan, diagnostic tests and medications  4.  Explain disease process/condition, treatment plan, diagnostic tests and medications  Outcome: Progressing     Problem: Skin Integrity  Goal: Skin integrity is maintained or improved  Description: Target End Date:  Prior to discharge or change in level of care    Document interventions on Skin Risk/Chalo flowsheet groups and corresponding LDA    1.  Assess and monitor skin integrity, appearance and/or temperature  2.  Assess risk factors for impaired skin integrity and/or pressures ulcers  3.  Implement precautions to protect skin integrity in collaboration with interdisciplinary team  4.  Implement pressure ulcer prevention protocol if at risk for skin breakdown  5.  Confirm wound care consult if at risk for skin breakdown  6.  Ensure patient use of pressure relieving devices  (Low air loss bed, waffle overlay, heel protectors, ROHO cushion, etc)  Outcome: Progressing     Problem: Fall Risk  Goal: Patient will remain free from falls  Description: Target End Date:  Prior to discharge or change in level of care    Document interventions on the Kaelyn Mandujano Fall Risk Assessment    1.  Assess for fall risk  factors  2.  Implement fall precautions  Outcome: Progressing     Problem: Pain - Standard  Goal: Alleviation of pain or a reduction in pain to the patient’s comfort goal  Description: Target End Date:  Prior to discharge or change in level of care    Document on Vitals flowsheet    1.  Document pain using the appropriate pain scale per order or unit policy  2.  Educate and implement non-pharmacologic comfort measures (i.e. relaxation, distraction, massage, cold/heat therapy, etc.)  3.  Pain management medications as ordered  4.  Reassess pain after pain med administration per policy  5.  If opiods administered assess patient's response to pain medication is appropriate per POSS sedation scale  6.  Follow pain management plan developed in collaboration with patient and interdisciplinary team (including palliative care or pain specialists if applicable)  Outcome: Progressing       Patient is not progressing towards the following goals:

## 2025-07-13 NOTE — PROGRESS NOTES
Pt received cash in room from family member. Pt requesting cash to be placed in the bank. Pt informed that it can be placed in the hospital safe; pt agreeable. Pt's daughter made aware and is agreeable. Security informed and at bedside to count cash and transport to safe. $2500 sent to safe.

## 2025-07-13 NOTE — PROGRESS NOTES
[facility] Medicine Daily Progress Note    Date of Service  7/13/2025    Chief Complaint  Jimmy Amaya is a 70 y.o. male admitted 7/7/2025 with infected left diabetic foot wound and AMS.    Hospital Course  Patient is a 70-year-old male with chronic diabetes uncontrolled with minimal medical compliance lives alone in a trailer.  He drove himself to the hospital in [place] secondary to the wound on his foot and while in [place] was told that he needed to be flown to [place] for amputation and at that point he signed himself out AGAINST MEDICAL ADVICE and apparently got in his car and drove away.   was called by the hospital for a wellness check and patient was found with his car parked on the side of the freeway and was wandering in the middle of the freeway in [place] when he was picked back up again.  He was taken back to the hospital and then sent here for further evaluation.  The wounds on his foot are covered with maggots which are draining from the abscess.  Initially he was admitted to the intensive care unit and started on aggressive IV antibiotics.  Patient downgraded from the intensive care unit to the telemetry unit and orthopedic surgery consulted.  Patient has osteomyelitis in the 5th and 4th metatarsal on MRI in addition to the severe macerated skin and muscle on the left lateral border.  Patient to be taken to the OR for amputation below the knee.    Interval Problem Update  7/9 patient seen for the first time today after downgrade from the ICU.  He is aware that he has an infection in his left foot and is aware that he needs amputation.  Orthopedic surgery saw the patient and will be taking him for a transtibial amputation.  7/10 patient seems to be slightly more coherent today however this is in between sessions where he forgets where he is and yanks out his IV.  He will be without IV access until his next dose of antibiotics.  Infectious disease to consult given the continued infection despite  "amputation of the foot.  ONUR actually showed good blood flow.  WBC count elevated secondary to surgical intervention yesterday.  7/11 patient seems a little bit more oriented today than previous days.  WBC count is trending down.  ID recommending Augmentin x 2 weeks for treatment of infection.  He will need continued wound care and eventually assistance with prosthesis.  7/12 patient states he feels somewhat miserable today as the pain is under control.  He is on very low-dose oxycodone and though he is opiate naïve would benefit from increasing in the dose.  I will double the dose of oxycodone in effort to get his pain under better control.  PT OT evaluations pending and patient likely okay to go to skilled nursing either Monday or Tuesday depending on acceptance.  7/13 patient doing about the same today, he did show improvement yesterday when the pain medication was increased however now he states that oxycodone is too addictive and he will not take it.  We discussed other pain medications including gabapentin, Ultram and he is only agreeable to take scheduled Tylenol at this time.  He states he is in pain but \"will not chance it\".  I reached out daughter and gave a phone update.      I have discussed this patient's plan of care and discharge plan at IDT rounds today with Case Management, Nursing, Nursing leadership, and other members of the IDT team.    Consultants/Specialty  orthopedics    Code Status  Full Code    Disposition  The patient is not medically cleared for discharge to home or a post-acute facility.  Anticipate discharge to: skilled nursing facility    I have placed the appropriate orders for post-discharge needs.    Review of Systems  Review of Systems   Constitutional:  Negative for chills and fever.   HENT:  Negative for congestion.    Eyes:  Negative for blurred vision and photophobia.   Respiratory:  Negative for cough and shortness of breath.    Cardiovascular:  Negative for chest pain, " claudication and leg swelling.   Gastrointestinal:  Negative for abdominal pain, constipation, diarrhea, heartburn and vomiting.   Genitourinary:  Negative for dysuria and hematuria.   Musculoskeletal:  Positive for myalgias. Negative for joint pain.   Skin:  Negative for itching and rash.   Neurological:  Negative for dizziness, sensory change, speech change, weakness and headaches.   Psychiatric/Behavioral:  Negative for depression. The patient is not nervous/anxious and does not have insomnia.         Physical Exam  Temp:  [35.9 °C (96.7 °F)-36.7 °C (98.1 °F)] 36.4 °C (97.6 °F)  Pulse:  [] 84  Resp:  [16-18] 18  BP: (100-138)/(58-78) 120/69  SpO2:  [90 %-96 %] 94 %    Physical Exam  Vitals and nursing note reviewed.   Constitutional:       General: He is not in acute distress.     Appearance: Normal appearance.   HENT:      Head: Normocephalic and atraumatic.   Eyes:      General: No scleral icterus.     Extraocular Movements: Extraocular movements intact.   Cardiovascular:      Rate and Rhythm: Normal rate and regular rhythm.      Pulses: Normal pulses.      Heart sounds: Normal heart sounds. No murmur heard.  Pulmonary:      Effort: Pulmonary effort is normal. No respiratory distress.      Breath sounds: Normal breath sounds. No wheezing, rhonchi or rales.   Abdominal:      General: Abdomen is flat. Bowel sounds are normal. There is no distension.      Palpations: Abdomen is soft.      Tenderness: There is no rebound.   Musculoskeletal:         General: No swelling or tenderness.      Cervical back: Normal range of motion and neck supple.      Comments: Left below the knee amputation in place, postoperative dressing in place, wound incisional VAC in place, hemovac removed.   Lymphadenopathy:      Cervical: No cervical adenopathy.   Skin:     Coloration: Skin is not jaundiced.      Findings: No erythema.   Neurological:      General: No focal deficit present.      Mental Status: He is alert and oriented to  person, place, and time. Mental status is at baseline.      Cranial Nerves: No cranial nerve deficit.   Psychiatric:         Mood and Affect: Mood normal.         Behavior: Behavior normal.         Fluids    Intake/Output Summary (Last 24 hours) at 7/13/2025 1434  Last data filed at 7/12/2025 1833  Gross per 24 hour   Intake --   Output 500 ml   Net -500 ml        Laboratory  Recent Labs     07/11/25  0044 07/12/25  0041 07/13/25  0311   WBC 18.7* 15.1* 15.6*   RBC 4.33* 4.70 4.89   HEMOGLOBIN 12.0* 12.7* 13.6*   HEMATOCRIT 36.4* 39.6* 41.8*   MCV 84.1 84.3 85.5   MCH 27.7 27.0 27.8   MCHC 33.0 32.1* 32.5   RDW 41.9 43.2 43.0   PLATELETCT 249 287 264   MPV 10.6 10.6 10.7     Recent Labs     07/11/25  0044 07/12/25  0041 07/13/25  0311   SODIUM 134* 134* 133*   POTASSIUM 3.9 3.8 4.4   CHLORIDE 101 100 98   CO2 25 25 26   GLUCOSE 250* 183* 149*   BUN 18 14 13   CREATININE 0.47* 0.47* 0.58   CALCIUM 8.2* 8.4* 8.6                   Imaging  US-ONUR SINGLE LEVEL BILAT   Final Result      MR-FOOT-WITH & W/O LEFT   Final Result      1.  There is a large area of necrotic tissue/abscess in the lateral aspect of the forefoot.   2.  Osteomyelitis with necrosis versus intraosseous gas of the fifth metatarsal and the proximal and middle phalanges.   3.  There is also abnormal signal within the fourth metatarsal and proximal phalanx which may be reactive although additional infection cannot be excluded.   4.  There is diffuse soft tissue edema and enhancement which likely represents cellulitis and myositis.      DX-CHEST-PORTABLE (1 VIEW)   Final Result      Elevation of the right hemidiaphragm. Patchy right basilar opacities, atelectasis or infection.           Assessment/Plan  * Diabetic foot infection (HCC)- (present on admission)  Assessment & Plan  Hx of prior wounds on the foot now worse, XR showing osteo, 4/4 SIRS given sepsis bolus at OSH not hypotensive  Continue with cefepime  Purulent track age at the anterior portion  of his leg discovered during BKA, area of purulence was washed extensively and cultures obtained and wound incision VAC in place.  Appreciated ID consultation, Augmentin x 2 weeks  Patient refusing any narcotics at this point as he does not want to become addicted, explained that he will not become addicted by taking pain medication that is only after he take it when you are no longer in pain.  He states he will not take the chance.  His only agreeable pain medication is Tylenol which will be scheduled every 6 hours.      Constipation  Assessment & Plan  Last bowel movement 7/7  Bowel protocol in place  Additional Dulcolax ordered however patient refusing    Hiccups  Assessment & Plan  Baclofen PRN, helping        Acute hypoxic respiratory failure (HCC)- (present on admission)  Assessment & Plan  Resolved, on room air      Self neglect- (present on admission)  Assessment & Plan  Patient currently does not have decision making capacity, daughter is NOK for consents  Capacity eval done by psychiatry    Sepsis ([facility])- (present on admission)  Assessment & Plan  This is Sepsis Present on admission  SIRS criteria identified on my evaluation include: Fever, with temperature greater than 100.9 deg F, Tachycardia, with heart rate greater than 90 BPM, Tachypnea, with respirations greater than 20 per minute, and Leukocytosis, with WBC greater than 12,000  Clinical indicators of end organ dysfunction include Lactic Acid greater than 2  Source is osteo L foot  Sepsis protocol initiated  Crystalloid Fluid Administration: Fluid resuscitation ordered per standard protocol - 30 mL/kg per current or ideal body weight at OSH given   antibiotics as appropriate for source of sepsis  Reassessment: I have reassessed the patient's hemodynamic status      Altered mental state  Assessment & Plan  Exacerbated by his infxn, concern for baseline mentation decline per sister, house is full of garbage and pt had maggots in his wound found  wandering highway  -discussed with daughter, had memory issues in the past and difficulty caring for self but not the severe change that occurred in last week  -Will get MRI brain to r/o stroke once infection/leg in dealt with, discussed with patient today currently he does not want an MRI we will address later in the hospital stay  - Times when he is coherent and other times where he is completely confused, still not appropriate capacity to make medical decisions  - Frequent orientation  - Begin weaning mentation cooperative with care most of the time, without IV access secondary to multiple pulled out IVs      Type 2 diabetes mellitus with circulatory disorder, without long-term current use of insulin (HCC)- (present on admission)  Assessment & Plan  ISS with PO intake  Diabetic diet after surgery      Type 2 diabetes mellitus with diabetic polyneuropathy (HCC)- (present on admission)  Assessment & Plan  Uncontrolled due to noncompliance, no sign of DKA/HOS  -15 u of Lantus and SSI  -Accuchecks and hypoglycemia protocol         VTE prophylaxis:   SCDs/TEDs      I have performed a physical exam and reviewed and updated ROS and Plan today (7/13/2025). In review of yesterday's note (7/12/2025), there are no changes except as documented above.    My total time spent caring for the patient on the day of the encounter was 58 minutes.   This does not include time spent on separately billable procedures/tests.

## 2025-07-13 NOTE — PROGRESS NOTES
"Pt stated 10/10 pain twinges in left leg. Offered PRN oxy. Pt declined, stated he did not want any pain meds that are addictive. Offered to ask provider for tylenol or gabapentin. Pt declined, stated \"I'm ok\". Provider made aware. Call bell in reach.  "

## 2025-07-13 NOTE — CARE PLAN
The patient is Stable - Low risk of patient condition declining or worsening    Shift Goals  Clinical Goals: monitor wound vac, pain management, increase mobility  Patient Goals: sleep  Family Goals: lydia    Progress made toward(s) clinical / shift goals:    Problem: Skin Integrity  Goal: Skin integrity is maintained or improved  Outcome: Progressing     Problem: Fall Risk  Goal: Patient will remain free from falls  Outcome: Progressing     Problem: Pain - Standard  Goal: Alleviation of pain or a reduction in pain to the patient’s comfort goal  Outcome: Progressing       Patient is not progressing towards the following goals:

## 2025-07-13 NOTE — PROGRESS NOTES
"Pt refused bed alarm. This RN educated patient on the associated risks. Pt verbalized understanding, stating \"trust me, I don't want to get up by myself.\" Call light and belongings within reach. Bed locked and low.   "

## 2025-07-13 NOTE — ASSESSMENT & PLAN NOTE
Last bowel movement 7/7  Bowel protocol in place  No results after Dulcolax, start lactulose    7/15: Patient had 5 bowel movements overnight.  7/16: monitoring for BM today. I reduced PRN opioid doses.  7/17: pt had a watery BM yesterday

## 2025-07-14 LAB
ANION GAP SERPL CALC-SCNC: 8 MMOL/L (ref 7–16)
BUN SERPL-MCNC: 9 MG/DL (ref 8–22)
CALCIUM SERPL-MCNC: 8.4 MG/DL (ref 8.5–10.5)
CHLORIDE SERPL-SCNC: 103 MMOL/L (ref 96–112)
CO2 SERPL-SCNC: 25 MMOL/L (ref 20–33)
CREAT SERPL-MCNC: 0.44 MG/DL (ref 0.5–1.4)
ERYTHROCYTE [DISTWIDTH] IN BLOOD BY AUTOMATED COUNT: 43.8 FL (ref 35.9–50)
GFR SERPLBLD CREATININE-BSD FMLA CKD-EPI: 115 ML/MIN/1.73 M 2
GLUCOSE BLD STRIP.AUTO-MCNC: 126 MG/DL (ref 65–99)
GLUCOSE BLD STRIP.AUTO-MCNC: 171 MG/DL (ref 65–99)
GLUCOSE BLD STRIP.AUTO-MCNC: 189 MG/DL (ref 65–99)
GLUCOSE SERPL-MCNC: 141 MG/DL (ref 65–99)
HCT VFR BLD AUTO: 36.8 % (ref 42–52)
HGB BLD-MCNC: 11.8 G/DL (ref 14–18)
MCH RBC QN AUTO: 27.3 PG (ref 27–33)
MCHC RBC AUTO-ENTMCNC: 32.1 G/DL (ref 32.3–36.5)
MCV RBC AUTO: 85.2 FL (ref 81.4–97.8)
PLATELET # BLD AUTO: 297 K/UL (ref 164–446)
PMV BLD AUTO: 10.4 FL (ref 9–12.9)
POTASSIUM SERPL-SCNC: 4.1 MMOL/L (ref 3.6–5.5)
RBC # BLD AUTO: 4.32 M/UL (ref 4.7–6.1)
SODIUM SERPL-SCNC: 136 MMOL/L (ref 135–145)
WBC # BLD AUTO: 14.6 K/UL (ref 4.8–10.8)

## 2025-07-14 PROCEDURE — 770001 HCHG ROOM/CARE - MED/SURG/GYN PRIV*

## 2025-07-14 PROCEDURE — A9270 NON-COVERED ITEM OR SERVICE: HCPCS | Performed by: INTERNAL MEDICINE

## 2025-07-14 PROCEDURE — 700102 HCHG RX REV CODE 250 W/ 637 OVERRIDE(OP): Performed by: INTERNAL MEDICINE

## 2025-07-14 PROCEDURE — 85027 COMPLETE CBC AUTOMATED: CPT

## 2025-07-14 PROCEDURE — 94760 N-INVAS EAR/PLS OXIMETRY 1: CPT

## 2025-07-14 PROCEDURE — 82962 GLUCOSE BLOOD TEST: CPT | Performed by: INTERNAL MEDICINE

## 2025-07-14 PROCEDURE — 80048 BASIC METABOLIC PNL TOTAL CA: CPT

## 2025-07-14 PROCEDURE — 36415 COLL VENOUS BLD VENIPUNCTURE: CPT

## 2025-07-14 PROCEDURE — 99232 SBSQ HOSP IP/OBS MODERATE 35: CPT | Performed by: INTERNAL MEDICINE

## 2025-07-14 RX ORDER — LACTULOSE 10 G/15ML
30 SOLUTION ORAL 3 TIMES DAILY
Status: DISCONTINUED | OUTPATIENT
Start: 2025-07-14 | End: 2025-07-15

## 2025-07-14 RX ORDER — BISACODYL 5 MG
10 TABLET, DELAYED RELEASE (ENTERIC COATED) ORAL ONCE
Status: COMPLETED | OUTPATIENT
Start: 2025-07-14 | End: 2025-07-14

## 2025-07-14 RX ADMIN — SENNOSIDES AND DOCUSATE SODIUM 2 TABLET: 50; 8.6 TABLET ORAL at 16:55

## 2025-07-14 RX ADMIN — AMOXICILLIN AND CLAVULANATE POTASSIUM 1 TABLET: 875; 125 TABLET, FILM COATED ORAL at 16:55

## 2025-07-14 RX ADMIN — INSULIN GLARGINE-YFGN 15 UNITS: 100 INJECTION, SOLUTION SUBCUTANEOUS at 17:00

## 2025-07-14 RX ADMIN — INSULIN LISPRO 3 UNITS: 100 INJECTION, SOLUTION INTRAVENOUS; SUBCUTANEOUS at 11:53

## 2025-07-14 RX ADMIN — ACETAMINOPHEN 1000 MG: 500 TABLET, FILM COATED ORAL at 11:47

## 2025-07-14 RX ADMIN — LACTULOSE 30 ML: 10 SOLUTION ORAL; RECTAL at 13:45

## 2025-07-14 RX ADMIN — LACTULOSE 30 ML: 10 SOLUTION ORAL; RECTAL at 16:55

## 2025-07-14 RX ADMIN — ACETAMINOPHEN 1000 MG: 500 TABLET, FILM COATED ORAL at 06:10

## 2025-07-14 RX ADMIN — AMOXICILLIN AND CLAVULANATE POTASSIUM 1 TABLET: 875; 125 TABLET, FILM COATED ORAL at 06:10

## 2025-07-14 RX ADMIN — INSULIN LISPRO 3 UNITS: 100 INJECTION, SOLUTION INTRAVENOUS; SUBCUTANEOUS at 17:01

## 2025-07-14 RX ADMIN — ACETAMINOPHEN 1000 MG: 500 TABLET, FILM COATED ORAL at 18:00

## 2025-07-14 RX ADMIN — BISACODYL 10 MG: 5 TABLET, COATED ORAL at 08:50

## 2025-07-14 ASSESSMENT — ENCOUNTER SYMPTOMS
SENSORY CHANGE: 0
DEPRESSION: 0
CLAUDICATION: 0
CHILLS: 0
HEARTBURN: 0
BLURRED VISION: 0
SHORTNESS OF BREATH: 0
ABDOMINAL PAIN: 0
PHOTOPHOBIA: 0
MYALGIAS: 1
NERVOUS/ANXIOUS: 0
CONSTIPATION: 0
VOMITING: 0
WEAKNESS: 0
HEADACHES: 0
INSOMNIA: 0
FEVER: 0
SPEECH CHANGE: 0
DIARRHEA: 0
DIZZINESS: 0
COUGH: 0

## 2025-07-14 ASSESSMENT — PAIN DESCRIPTION - PAIN TYPE
TYPE: ACUTE PAIN
TYPE: ACUTE PAIN

## 2025-07-14 NOTE — CARE PLAN
The patient is Stable - Low risk of patient condition declining or worsening    Shift Goals  Clinical Goals: Monitor Wound/wound vac, pain management, Discharge plan,  monitor labs/vitals  Patient Goals: sleep  Family Goals: lydia    Progress made toward(s) clinical / shift goals: . Pending DC to [place]. Barrier: has not had BM. pt given lactulose per MAR     Problem: Knowledge Deficit - Standard  Goal: Patient and family/care givers will demonstrate understanding of plan of care, disease process/condition, diagnostic tests and medications  Outcome: Progressing     Problem: Skin Integrity  Goal: Skin integrity is maintained or improved  Outcome: Progressing     Problem: Fall Risk  Goal: Patient will remain free from falls  Outcome: Progressing       Patient is not progressing towards the following goals:

## 2025-07-14 NOTE — PROGRESS NOTES
[facility] Medicine Daily Progress Note    Date of Service  7/14/2025    Chief Complaint  Jimmy Amaya is a 70 y.o. male admitted 7/7/2025 with infected left diabetic foot wound and AMS.    Hospital Course  Patient is a 70-year-old male with chronic diabetes uncontrolled with minimal medical compliance lives alone in a trailer.  He drove himself to the hospital in [place] secondary to the wound on his foot and while in [place] was told that he needed to be flown to [place] for amputation and at that point he signed himself out AGAINST MEDICAL ADVICE and apparently got in his car and drove away.   was called by the hospital for a wellness check and patient was found with his car parked on the side of the freeway and was wandering in the middle of the freeway in [place] when he was picked back up again.  He was taken back to the hospital and then sent here for further evaluation.  The wounds on his foot are covered with maggots which are draining from the abscess.  Initially he was admitted to the intensive care unit and started on aggressive IV antibiotics.  Patient downgraded from the intensive care unit to the telemetry unit and orthopedic surgery consulted.  Patient has osteomyelitis in the 5th and 4th metatarsal on MRI in addition to the severe macerated skin and muscle on the left lateral border.  Patient to be taken to the OR for amputation below the knee.    Interval Problem Update  7/9 patient seen for the first time today after downgrade from the ICU.  He is aware that he has an infection in his left foot and is aware that he needs amputation.  Orthopedic surgery saw the patient and will be taking him for a transtibial amputation.  7/10 patient seems to be slightly more coherent today however this is in between sessions where he forgets where he is and yanks out his IV.  He will be without IV access until his next dose of antibiotics.  Infectious disease to consult given the continued infection despite  "amputation of the foot.  ONUR actually showed good blood flow.  WBC count elevated secondary to surgical intervention yesterday.  7/11 patient seems a little bit more oriented today than previous days.  WBC count is trending down.  ID recommending Augmentin x 2 weeks for treatment of infection.  He will need continued wound care and eventually assistance with prosthesis.  7/12 patient states he feels somewhat miserable today as the pain is under control.  He is on very low-dose oxycodone and though he is opiate naïve would benefit from increasing in the dose.  I will double the dose of oxycodone in effort to get his pain under better control.  PT OT evaluations pending and patient likely okay to go to skilled nursing either Monday or Tuesday depending on acceptance.  7/13 patient doing about the same today, he did show improvement yesterday when the pain medication was increased however now he states that oxycodone is too addictive and he will not take it.  We discussed other pain medications including gabapentin, Ultram and he is only agreeable to take scheduled Tylenol at this time.  He states he is in pain but \"will not chance it\".  I reached out daughter and gave a phone update.    7/14 patient actually in somewhat good spirits today.  He was at the edge of the bed sitting up and asked for an update on how he was doing.  He is still hesitant to take narcotic pain medication but understands that the likelihood of addiction is very small and he does not want to suffer in pain.  He still has not had a bowel movement despite bowel protocol and Dulcolax therefore I will start him on lactulose and if this is ineffective then we will get mag citrate.  Once he has a bowel movement he will be medically cleared to transition to skilled nursing.    I have discussed this patient's plan of care and discharge plan at IDT rounds today with Case Management, Nursing, Nursing leadership, and other members of the IDT " team.    Consultants/Specialty  orthopedics    Code Status  Full Code    Disposition  The patient is not medically cleared for discharge to home or a post-acute facility.  Anticipate discharge to: skilled nursing facility    I have placed the appropriate orders for post-discharge needs.    Review of Systems  Review of Systems   Constitutional:  Negative for chills and fever.   HENT:  Negative for congestion.    Eyes:  Negative for blurred vision and photophobia.   Respiratory:  Negative for cough and shortness of breath.    Cardiovascular:  Negative for chest pain, claudication and leg swelling.   Gastrointestinal:  Negative for abdominal pain, constipation, diarrhea, heartburn and vomiting.   Genitourinary:  Negative for dysuria and hematuria.   Musculoskeletal:  Positive for myalgias. Negative for joint pain.   Skin:  Negative for itching and rash.   Neurological:  Negative for dizziness, sensory change, speech change, weakness and headaches.   Psychiatric/Behavioral:  Negative for depression. The patient is not nervous/anxious and does not have insomnia.         Physical Exam  Temp:  [36.3 °C (97.3 °F)-36.6 °C (97.9 °F)] 36.3 °C (97.3 °F)  Pulse:  [71-88] 71  Resp:  [18] 18  BP: (105-136)/(55-66) 117/64  SpO2:  [93 %-94 %] 94 %    Physical Exam  Vitals and nursing note reviewed.   Constitutional:       General: He is not in acute distress.     Appearance: Normal appearance.   HENT:      Head: Normocephalic and atraumatic.   Eyes:      General: No scleral icterus.     Extraocular Movements: Extraocular movements intact.   Cardiovascular:      Rate and Rhythm: Normal rate and regular rhythm.      Pulses: Normal pulses.      Heart sounds: Normal heart sounds. No murmur heard.  Pulmonary:      Effort: Pulmonary effort is normal. No respiratory distress.      Breath sounds: Normal breath sounds. No wheezing, rhonchi or rales.   Abdominal:      General: Abdomen is flat. Bowel sounds are normal. There is no distension.       Palpations: Abdomen is soft.      Tenderness: There is no rebound.   Musculoskeletal:         General: No swelling or tenderness.      Cervical back: Normal range of motion and neck supple.      Comments: Left below the knee amputation in place, postoperative dressing in place, wound incisional VAC in place, hemovac removed.   Lymphadenopathy:      Cervical: No cervical adenopathy.   Skin:     Coloration: Skin is not jaundiced.      Findings: No erythema.   Neurological:      General: No focal deficit present.      Mental Status: He is alert and oriented to person, place, and time. Mental status is at baseline.      Cranial Nerves: No cranial nerve deficit.   Psychiatric:         Mood and Affect: Mood normal.         Behavior: Behavior normal.         Fluids    Intake/Output Summary (Last 24 hours) at 7/14/2025 1330  Last data filed at 7/14/2025 0600  Gross per 24 hour   Intake 240 ml   Output 3850 ml   Net -3610 ml        Laboratory  Recent Labs     07/12/25  0041 07/13/25  0311 07/14/25  0156   WBC 15.1* 15.6* 14.6*   RBC 4.70 4.89 4.32*   HEMOGLOBIN 12.7* 13.6* 11.8*   HEMATOCRIT 39.6* 41.8* 36.8*   MCV 84.3 85.5 85.2   MCH 27.0 27.8 27.3   MCHC 32.1* 32.5 32.1*   RDW 43.2 43.0 43.8   PLATELETCT 287 264 297   MPV 10.6 10.7 10.4     Recent Labs     07/12/25  0041 07/13/25  0311 07/14/25  0156   SODIUM 134* 133* 136   POTASSIUM 3.8 4.4 4.1   CHLORIDE 100 98 103   CO2 25 26 25   GLUCOSE 183* 149* 141*   BUN 14 13 9   CREATININE 0.47* 0.58 0.44*   CALCIUM 8.4* 8.6 8.4*                   Imaging  US-ONUR SINGLE LEVEL BILAT   Final Result      MR-FOOT-WITH & W/O LEFT   Final Result      1.  There is a large area of necrotic tissue/abscess in the lateral aspect of the forefoot.   2.  Osteomyelitis with necrosis versus intraosseous gas of the fifth metatarsal and the proximal and middle phalanges.   3.  There is also abnormal signal within the fourth metatarsal and proximal phalanx which may be reactive although  additional infection cannot be excluded.   4.  There is diffuse soft tissue edema and enhancement which likely represents cellulitis and myositis.      DX-CHEST-PORTABLE (1 VIEW)   Final Result      Elevation of the right hemidiaphragm. Patchy right basilar opacities, atelectasis or infection.           Assessment/Plan  * Diabetic foot infection (HCC)- (present on admission)  Assessment & Plan  Hx of prior wounds on the foot now worse, XR showing osteo, 4/4 SIRS given sepsis bolus at OSH not hypotensive  Continue with cefepime  Purulent track age at the anterior portion of his leg discovered during BKA, area of purulence was washed extensively and cultures obtained and wound incision VAC in place.  Appreciated ID consultation, Augmentin x 2 weeks  Patient now agreeable to take oxycodone if the pain gets bad, continue scheduled Tylenol      Constipation  Assessment & Plan  Last bowel movement 7/7  Bowel protocol in place  No results after Dulcolax, start lactulose    Hiccups  Assessment & Plan  Baclofen PRN, helping        Acute hypoxic respiratory failure (HCC)- (present on admission)  Assessment & Plan  Resolved, on room air      Self neglect- (present on admission)  Assessment & Plan  Patient currently does not have decision making capacity, daughter is NOK for consents  Capacity eval done by psychiatry    Sepsis (HCC)- (present on admission)  Assessment & Plan  This is Sepsis Present on admission  SIRS criteria identified on my evaluation include: Fever, with temperature greater than 100.9 deg F, Tachycardia, with heart rate greater than 90 BPM, Tachypnea, with respirations greater than 20 per minute, and Leukocytosis, with WBC greater than 12,000  Clinical indicators of end organ dysfunction include Lactic Acid greater than 2  Source is osteo L foot  Sepsis protocol initiated  Crystalloid Fluid Administration: Fluid resuscitation ordered per standard protocol - 30 mL/kg per current or ideal body weight at OSH  given   antibiotics as appropriate for source of sepsis  Reassessment: I have reassessed the patient's hemodynamic status      Altered mental state  Assessment & Plan  Exacerbated by his infxn, concern for baseline mentation decline per sister, [place] is full of garbage and pt had maggots in his wound found wandering highway  -discussed with daughter, had memory issues in the past and difficulty caring for self but not the severe change that occurred in last week  -Will get MRI brain to r/o stroke once infection/leg in dealt with, discussed with patient today currently he does not want an MRI we will address later in the hospital stay  - Times when he is coherent and other times where he is completely confused, still not appropriate capacity to make medical decisions  - Frequent orientation  - Begin weaning mentation cooperative with care most of the time, without IV access secondary to multiple pulled out IVs      Type 2 diabetes mellitus with circulatory disorder, without long-term current use of insulin (HCC)- (present on admission)  Assessment & Plan  ISS with PO intake  Diabetic diet after surgery      Type 2 diabetes mellitus with diabetic polyneuropathy (HCC)- (present on admission)  Assessment & Plan  Uncontrolled due to noncompliance, no sign of DKA/HOS  -15 u of Lantus and SSI  -Accuchecks and hypoglycemia protocol         VTE prophylaxis: VTE Selection    I have performed a physical exam and reviewed and updated ROS and Plan today (7/14/2025). In review of yesterday's note (7/13/2025), there are no changes except as documented above.    My total time spent caring for the patient on the day of the encounter was 58 minutes.   This does not include time spent on separately billable procedures/tests.

## 2025-07-14 NOTE — DISCHARGE PLANNING
Case Management Discharge Planning    Admission Date: 7/7/2025  GMLOS: 9.6  ALOS: 7    6-Clicks ADL Score: 18  6-Clicks Mobility Score: 13  PT and/or OT Eval ordered: Yes  Post-acute Referrals Ordered: Yes  Post-acute Choice Obtained: No  Has referral(s) been sent to post-acute provider:  Yes      Anticipated Discharge Dispo: Discharge Disposition: D/T to SNF with Medicare cert in anticipation of skilled care (03)    DME Needed: No    Action(s) Taken: Case discussed in IDT rounds pt is medically cleared just needs to have a BM prior to SNF admission bowel protocol administered this AM. LMSW called pt's daughter Gabby for SNF choice call went to  message left for her to CB with SNF choice.    Escalations Completed: None    Medically Clear: Yes    Next Steps: BM and choice from Pt's daughter.    Barriers to Discharge: Pending Placement    Addendum: @10amLMSW spoke with pt's daughter Esperanza for SNF choice, [facility]st [facility] faxed to [facility]. Esperanza also reports pt's sister should be notified upon his discharge as she lives in [place] and pt's sister Charline lives in [place]. Charline currently is a ER contact.   @ 1027 spoke with Elle with [facility] they can admit pt today if he has a BM.

## 2025-07-14 NOTE — CARE PLAN
The patient is Stable - Low risk of patient condition declining or worsening    Shift Goals  Clinical Goals: monitor wound vac, pain management, monitor vitals, monitor labs  Patient Goals: sleep  Family Goals: lydia    Progress made toward(s) clinical / shift goals:        Problem: Knowledge Deficit - Standard  Goal: Patient and family/care givers will demonstrate understanding of plan of care, disease process/condition, diagnostic tests and medications  Description: Target End Date:  1-3 days or as soon as patient condition allows    Document in Patient Education    1.  Patient and family/caregiver oriented to unit, equipment, visitation policy and means for communicating concern  2.  Complete/review Learning Assessment  3.  Assess knowledge level of disease process/condition, treatment plan, diagnostic tests and medications  4.  Explain disease process/condition, treatment plan, diagnostic tests and medications  Outcome: Progressing     Problem: Skin Integrity  Goal: Skin integrity is maintained or improved  Description: Target End Date:  Prior to discharge or change in level of care    Document interventions on Skin Risk/Chalo flowsheet groups and corresponding LDA    1.  Assess and monitor skin integrity, appearance and/or temperature  2.  Assess risk factors for impaired skin integrity and/or pressures ulcers  3.  Implement precautions to protect skin integrity in collaboration with interdisciplinary team  4.  Implement pressure ulcer prevention protocol if at risk for skin breakdown  5.  Confirm wound care consult if at risk for skin breakdown  6.  Ensure patient use of pressure relieving devices  (Low air loss bed, waffle overlay, heel protectors, ROHO cushion, etc)  Outcome: Progressing     Problem: Fall Risk  Goal: Patient will remain free from falls  Description: Target End Date:  Prior to discharge or change in level of care    Document interventions on the Kaelyn Mandujano Fall Risk Assessment    1.  Assess  for fall risk factors  2.  Implement fall precautions  Outcome: Progressing     Problem: Pain - Standard  Goal: Alleviation of pain or a reduction in pain to the patient’s comfort goal  Description: Target End Date:  Prior to discharge or change in level of care    Document on Vitals flowsheet    1.  Document pain using the appropriate pain scale per order or unit policy  2.  Educate and implement non-pharmacologic comfort measures (i.e. relaxation, distraction, massage, cold/heat therapy, etc.)  3.  Pain management medications as ordered  4.  Reassess pain after pain med administration per policy  5.  If opiods administered assess patient's response to pain medication is appropriate per POSS sedation scale  6.  Follow pain management plan developed in collaboration with patient and interdisciplinary team (including palliative care or pain specialists if applicable)  Outcome: Progressing       Patient is not progressing towards the following goals:

## 2025-07-14 NOTE — DOCUMENTATION QUERY
"                                                                         [facility]                                                                       Query Response Note      PATIENT:               ALISSA NOEL  ACCT #:                  [id number]  MRN:                     [id number]  :                      1955  ADMIT DATE:       2025 10:03 PM  DISCH DATE:          RESPONDING  PROVIDER #:        [id number]           QUERY TEXT:    Altered mental state is documented in the Medical Record. Can a more specific diagnosis be provided?    The patient's Clinical Indicators include:  H&P: \"Sepsis ... Source is osteo L foot ... Altered mental state ... Exacerbated by his infxn, concern for baseline mentation decline per sister\"    7/10 PN: \"seems to be slightly more coherent today however this is in between sessions where he forgets where he is and yanks out his IV\"     PN: \"seems a little bit more oriented today than previous days\"    Risk Factors: 70 y.o. male with AMS, sepsis s/t L foot osteomyelitis, acute hypoxic respiratory failure.  Treatment: IV cefepime > PO Augmentin, IV Thiamine, L BKA on , supplemental oxygen, bed/chair alarm.    Thank you,  ZOE Webb, RN, CCDS, CCS  Clinical    Connect via [employer] or [email or web address]    Note: If you agree with a diagnosis, please remember to include it in your concurrent daily documentation and discharge summary.  Options provided:   -- Septic encephalopathy   -- Metabolic encephalopathy unrelated to sepsis   -- Delirium due to general medical condition   -- Delirium due to multiple etiologies   -- Other explanation, (please specify other explanation)   -- Unable to determine      Query created by: Jacinda Robles on 2025 10:47 AM    RESPONSE TEXT:    Delirium due to general medical condition          Electronically signed by:  GEORGI SOLORIO DO 2025 1:28 PM              "

## 2025-07-15 PROBLEM — R78.81 BACTEREMIA: Status: ACTIVE | Noted: 2025-07-15

## 2025-07-15 LAB
ANION GAP SERPL CALC-SCNC: 11 MMOL/L (ref 7–16)
BUN SERPL-MCNC: 11 MG/DL (ref 8–22)
CALCIUM SERPL-MCNC: 8.4 MG/DL (ref 8.5–10.5)
CHLORIDE SERPL-SCNC: 102 MMOL/L (ref 96–112)
CO2 SERPL-SCNC: 22 MMOL/L (ref 20–33)
CREAT SERPL-MCNC: 0.52 MG/DL (ref 0.5–1.4)
ERYTHROCYTE [DISTWIDTH] IN BLOOD BY AUTOMATED COUNT: 45.4 FL (ref 35.9–50)
GFR SERPLBLD CREATININE-BSD FMLA CKD-EPI: 109 ML/MIN/1.73 M 2
GLUCOSE BLD STRIP.AUTO-MCNC: 117 MG/DL (ref 65–99)
GLUCOSE BLD STRIP.AUTO-MCNC: 140 MG/DL (ref 65–99)
GLUCOSE BLD STRIP.AUTO-MCNC: 156 MG/DL (ref 65–99)
GLUCOSE BLD STRIP.AUTO-MCNC: 160 MG/DL (ref 65–99)
GLUCOSE SERPL-MCNC: 128 MG/DL (ref 65–99)
HCT VFR BLD AUTO: 39.9 % (ref 42–52)
HGB BLD-MCNC: 12.8 G/DL (ref 14–18)
MCH RBC QN AUTO: 27.8 PG (ref 27–33)
MCHC RBC AUTO-ENTMCNC: 32.1 G/DL (ref 32.3–36.5)
MCV RBC AUTO: 86.6 FL (ref 81.4–97.8)
PLATELET # BLD AUTO: 348 K/UL (ref 164–446)
PMV BLD AUTO: 10.4 FL (ref 9–12.9)
POTASSIUM SERPL-SCNC: 3.9 MMOL/L (ref 3.6–5.5)
RBC # BLD AUTO: 4.61 M/UL (ref 4.7–6.1)
SODIUM SERPL-SCNC: 135 MMOL/L (ref 135–145)
WBC # BLD AUTO: 16.4 K/UL (ref 4.8–10.8)

## 2025-07-15 PROCEDURE — 99233 SBSQ HOSP IP/OBS HIGH 50: CPT | Performed by: INTERNAL MEDICINE

## 2025-07-15 PROCEDURE — 700111 HCHG RX REV CODE 636 W/ 250 OVERRIDE (IP): Mod: JZ | Performed by: INTERNAL MEDICINE

## 2025-07-15 PROCEDURE — 97530 THERAPEUTIC ACTIVITIES: CPT

## 2025-07-15 PROCEDURE — A9270 NON-COVERED ITEM OR SERVICE: HCPCS | Performed by: INTERNAL MEDICINE

## 2025-07-15 PROCEDURE — 770001 HCHG ROOM/CARE - MED/SURG/GYN PRIV*

## 2025-07-15 PROCEDURE — 94760 N-INVAS EAR/PLS OXIMETRY 1: CPT

## 2025-07-15 PROCEDURE — 700111 HCHG RX REV CODE 636 W/ 250 OVERRIDE (IP): Performed by: INTERNAL MEDICINE

## 2025-07-15 PROCEDURE — 82962 GLUCOSE BLOOD TEST: CPT | Performed by: INTERNAL MEDICINE

## 2025-07-15 PROCEDURE — 700102 HCHG RX REV CODE 250 W/ 637 OVERRIDE(OP): Performed by: INTERNAL MEDICINE

## 2025-07-15 PROCEDURE — 36415 COLL VENOUS BLD VENIPUNCTURE: CPT

## 2025-07-15 PROCEDURE — 97110 THERAPEUTIC EXERCISES: CPT

## 2025-07-15 PROCEDURE — 87040 BLOOD CULTURE FOR BACTERIA: CPT

## 2025-07-15 PROCEDURE — 85027 COMPLETE CBC AUTOMATED: CPT

## 2025-07-15 PROCEDURE — 80048 BASIC METABOLIC PNL TOTAL CA: CPT

## 2025-07-15 PROCEDURE — 700105 HCHG RX REV CODE 258: Performed by: INTERNAL MEDICINE

## 2025-07-15 RX ORDER — LACTULOSE 10 G/15ML
15 SOLUTION ORAL 2 TIMES DAILY PRN
Status: DISCONTINUED | OUTPATIENT
Start: 2025-07-15 | End: 2025-07-18 | Stop reason: HOSPADM

## 2025-07-15 RX ADMIN — INSULIN LISPRO 3 UNITS: 100 INJECTION, SOLUTION INTRAVENOUS; SUBCUTANEOUS at 17:54

## 2025-07-15 RX ADMIN — HEPARIN SODIUM 5000 UNITS: 5000 INJECTION, SOLUTION INTRAVENOUS; SUBCUTANEOUS at 05:40

## 2025-07-15 RX ADMIN — AMOXICILLIN AND CLAVULANATE POTASSIUM 1 TABLET: 875; 125 TABLET, FILM COATED ORAL at 05:40

## 2025-07-15 RX ADMIN — ACETAMINOPHEN 1000 MG: 500 TABLET, FILM COATED ORAL at 17:48

## 2025-07-15 RX ADMIN — INSULIN LISPRO 3 UNITS: 100 INJECTION, SOLUTION INTRAVENOUS; SUBCUTANEOUS at 00:03

## 2025-07-15 RX ADMIN — INSULIN GLARGINE-YFGN 15 UNITS: 100 INJECTION, SOLUTION SUBCUTANEOUS at 17:54

## 2025-07-15 RX ADMIN — ACETAMINOPHEN 1000 MG: 500 TABLET, FILM COATED ORAL at 05:40

## 2025-07-15 RX ADMIN — AMPICILLIN AND SULBACTAM 3 G: 1; 2 INJECTION, POWDER, FOR SOLUTION INTRAMUSCULAR; INTRAVENOUS at 13:27

## 2025-07-15 RX ADMIN — AMPICILLIN AND SULBACTAM 3 G: 1; 2 INJECTION, POWDER, FOR SOLUTION INTRAMUSCULAR; INTRAVENOUS at 17:49

## 2025-07-15 RX ADMIN — OXYCODONE HYDROCHLORIDE 10 MG: 10 TABLET ORAL at 00:06

## 2025-07-15 RX ADMIN — ACETAMINOPHEN 1000 MG: 500 TABLET, FILM COATED ORAL at 12:30

## 2025-07-15 ASSESSMENT — ENCOUNTER SYMPTOMS
ABDOMINAL PAIN: 0
CONSTIPATION: 0
DIARRHEA: 0
CHILLS: 0
DIZZINESS: 0
PALPITATIONS: 0
SHORTNESS OF BREATH: 0
BACK PAIN: 0
NAUSEA: 0
HEADACHES: 0
VOMITING: 0
FEVER: 0
COUGH: 0

## 2025-07-15 ASSESSMENT — COGNITIVE AND FUNCTIONAL STATUS - GENERAL
MOBILITY SCORE: 17
WALKING IN HOSPITAL ROOM: TOTAL
SUGGESTED CMS G CODE MODIFIER MOBILITY: CK
CLIMB 3 TO 5 STEPS WITH RAILING: TOTAL
STANDING UP FROM CHAIR USING ARMS: A LITTLE

## 2025-07-15 ASSESSMENT — PAIN DESCRIPTION - PAIN TYPE
TYPE: ACUTE PAIN

## 2025-07-15 NOTE — PROGRESS NOTES
4 Eyes Skin Assessment Completed by DEBRA Hernandez and DEBRA Macario.    Skin assessment is primarily focused on high risk bony prominences. Pay special attention to skin beneath and around medical devices, high risk bony prominences, skin to skin areas and areas where the patient lacks sensation to feel pain and areas where the patient previously had breakdown.     Head (Occipital):  Scabs/scratches on R forhead   Ears (Under Medical Devices): WDL   Nose (Under Medical Devices): WDL   Mouth:  WDL   Neck: WDL   Breast/Chest:  WDL   Shoulder Blades:  Red,Blanching   Spine:   Red, Blanching, abrasion   (R) Arm/Elbow/Hand: Bruising LANE   (L) Arm/Elbow/Hand: Bruising   Abdomen: WDL   Pannus/Groin:  WDL   Sacrum/Coccyx:   red   (R) Ischial Tuberosity (Sit Bones):  WDL   (L) Ischial Tuberosity (Sit Bones):  WDL   (R) Leg:  WDL   (L) Leg:  L BKA, with ACE wrap and wound vac   (R) Heel:  WDL   (R) Foot/Toe: WDL   (L) Heel: BKA   (L) Foot/Toe:  BKA       DEVICES IN USE:   Respiratory Devices:  NA, patient on room air  Feeding Devices:  N/A   Lines & BP Monitoring Devices:  BP cuff and Pulse ox    Orthopedic Devices:  ACE wrap  Miscellaneous Devices:  Telemetry monitor, Wound Vac    PROTOCOL INTERVENTIONS:   Standard/Trauma Bed:  Already in place  Offloading Dressing- Heel      WOUND PHOTOS:   Completed and in EPIC     WOUND CONSULT:   N/A, no advanced wound care needs identified

## 2025-07-15 NOTE — DISCHARGE PLANNING
Agency/Facility Name: [facility]   Outcome: DPA placed call to let them know Pt will need one more day per

## 2025-07-15 NOTE — DISCHARGE PLANNING
Case Management Discharge Planning    Admission Date: 7/7/2025  GMLOS: 9.6  ALOS: 8    6-Clicks ADL Score: 18  6-Clicks Mobility Score: 13  PT and/or OT Eval ordered: Yes  Post-acute Referrals Ordered: Yes  Post-acute Choice Obtained: Yes  Has referral(s) been sent to post-acute provider:  Yes      Anticipated Discharge Dispo: Discharge Disposition: D/T to SNF with Medicare cert in anticipation of skilled care (03)    DME Needed: No    Action(s) Taken: Case discussed in IDT rounds pt is not medically cleared today updated labs are needed before discharge. Request sent to DPA to update [facility]    Escalations Completed: None    Medically Clear: No    Next Steps: LMSW to follow for needs and barriers to discharge.    Barriers to Discharge: Medical clearance

## 2025-07-15 NOTE — CARE PLAN
The patient is Stable - Low risk of patient condition declining or worsening    Shift Goals  Clinical Goals: Monitor WBC, blood cltures drawn  Patient Goals: sleep  Family Goals: lydia    Progress made toward(s) clinical / shift goals: Pt educated on poc, pt's wbc trending up, blood cultures drawn    Problem: Knowledge Deficit - Standard  Goal: Patient and family/care givers will demonstrate understanding of plan of care, disease process/condition, diagnostic tests and medications  Outcome: Progressing     Problem: Skin Integrity  Goal: Skin integrity is maintained or improved  Outcome: Progressing     Problem: Fall Risk  Goal: Patient will remain free from falls  Outcome: Progressing     Problem: Pain - Standard  Goal: Alleviation of pain or a reduction in pain to the patient’s comfort goal  Outcome: Progressing       Patient is not progressing towards the following goals:

## 2025-07-16 LAB
FUNGUS SPEC CULT: NORMAL
FUNGUS SPEC FUNGUS STN: NORMAL
GLUCOSE BLD STRIP.AUTO-MCNC: 118 MG/DL (ref 65–99)
GLUCOSE BLD STRIP.AUTO-MCNC: 157 MG/DL (ref 65–99)
GLUCOSE BLD STRIP.AUTO-MCNC: 161 MG/DL (ref 65–99)
GLUCOSE BLD STRIP.AUTO-MCNC: 171 MG/DL (ref 65–99)
GLUCOSE BLD STRIP.AUTO-MCNC: 183 MG/DL (ref 65–99)
SIGNIFICANT IND 70042: NORMAL
SITE SITE: NORMAL
SOURCE SOURCE: NORMAL

## 2025-07-16 PROCEDURE — 82962 GLUCOSE BLOOD TEST: CPT | Performed by: INTERNAL MEDICINE

## 2025-07-16 PROCEDURE — 700102 HCHG RX REV CODE 250 W/ 637 OVERRIDE(OP): Performed by: INTERNAL MEDICINE

## 2025-07-16 PROCEDURE — 700111 HCHG RX REV CODE 636 W/ 250 OVERRIDE (IP): Mod: JZ | Performed by: INTERNAL MEDICINE

## 2025-07-16 PROCEDURE — 99233 SBSQ HOSP IP/OBS HIGH 50: CPT | Performed by: INTERNAL MEDICINE

## 2025-07-16 PROCEDURE — 770001 HCHG ROOM/CARE - MED/SURG/GYN PRIV*

## 2025-07-16 PROCEDURE — A9270 NON-COVERED ITEM OR SERVICE: HCPCS | Performed by: INTERNAL MEDICINE

## 2025-07-16 PROCEDURE — 94760 N-INVAS EAR/PLS OXIMETRY 1: CPT

## 2025-07-16 PROCEDURE — 700105 HCHG RX REV CODE 258: Performed by: INTERNAL MEDICINE

## 2025-07-16 RX ORDER — OXYCODONE HYDROCHLORIDE 5 MG/1
2.5 TABLET ORAL
Refills: 0 | Status: DISCONTINUED | OUTPATIENT
Start: 2025-07-16 | End: 2025-07-18 | Stop reason: HOSPADM

## 2025-07-16 RX ORDER — HYDROMORPHONE HYDROCHLORIDE 1 MG/ML
0.25 INJECTION, SOLUTION INTRAMUSCULAR; INTRAVENOUS; SUBCUTANEOUS
Status: DISCONTINUED | OUTPATIENT
Start: 2025-07-16 | End: 2025-07-18 | Stop reason: HOSPADM

## 2025-07-16 RX ORDER — OXYCODONE HYDROCHLORIDE 5 MG/1
5 TABLET ORAL
Refills: 0 | Status: DISCONTINUED | OUTPATIENT
Start: 2025-07-16 | End: 2025-07-18 | Stop reason: HOSPADM

## 2025-07-16 RX ADMIN — ACETAMINOPHEN 1000 MG: 500 TABLET, FILM COATED ORAL at 11:59

## 2025-07-16 RX ADMIN — AMPICILLIN AND SULBACTAM 3 G: 1; 2 INJECTION, POWDER, FOR SOLUTION INTRAMUSCULAR; INTRAVENOUS at 12:15

## 2025-07-16 RX ADMIN — RIVAROXABAN 10 MG: 10 TABLET, FILM COATED ORAL at 17:12

## 2025-07-16 RX ADMIN — INSULIN LISPRO 3 UNITS: 100 INJECTION, SOLUTION INTRAVENOUS; SUBCUTANEOUS at 12:02

## 2025-07-16 RX ADMIN — ACETAMINOPHEN 1000 MG: 500 TABLET, FILM COATED ORAL at 00:20

## 2025-07-16 RX ADMIN — INSULIN GLARGINE-YFGN 15 UNITS: 100 INJECTION, SOLUTION SUBCUTANEOUS at 17:14

## 2025-07-16 RX ADMIN — AMPICILLIN AND SULBACTAM 3 G: 1; 2 INJECTION, POWDER, FOR SOLUTION INTRAMUSCULAR; INTRAVENOUS at 00:22

## 2025-07-16 RX ADMIN — ACETAMINOPHEN 1000 MG: 500 TABLET, FILM COATED ORAL at 23:59

## 2025-07-16 RX ADMIN — ACETAMINOPHEN 1000 MG: 500 TABLET, FILM COATED ORAL at 06:21

## 2025-07-16 RX ADMIN — INSULIN LISPRO 3 UNITS: 100 INJECTION, SOLUTION INTRAVENOUS; SUBCUTANEOUS at 17:14

## 2025-07-16 RX ADMIN — INSULIN LISPRO 3 UNITS: 100 INJECTION, SOLUTION INTRAVENOUS; SUBCUTANEOUS at 00:22

## 2025-07-16 RX ADMIN — AMPICILLIN AND SULBACTAM 3 G: 1; 2 INJECTION, POWDER, FOR SOLUTION INTRAMUSCULAR; INTRAVENOUS at 06:24

## 2025-07-16 RX ADMIN — ACETAMINOPHEN 1000 MG: 500 TABLET, FILM COATED ORAL at 17:12

## 2025-07-16 RX ADMIN — AMPICILLIN AND SULBACTAM 3 G: 1; 2 INJECTION, POWDER, FOR SOLUTION INTRAMUSCULAR; INTRAVENOUS at 18:00

## 2025-07-16 RX ADMIN — HYDROMORPHONE HYDROCHLORIDE 0.25 MG: 1 INJECTION, SOLUTION INTRAMUSCULAR; INTRAVENOUS; SUBCUTANEOUS at 15:09

## 2025-07-16 ASSESSMENT — ENCOUNTER SYMPTOMS
BACK PAIN: 0
VOMITING: 0
SHORTNESS OF BREATH: 0
PALPITATIONS: 0
CHILLS: 0
HEADACHES: 0
DIARRHEA: 0
DIZZINESS: 0
FEVER: 0
CONSTIPATION: 0
COUGH: 0
NAUSEA: 0
ABDOMINAL PAIN: 0

## 2025-07-16 ASSESSMENT — PAIN DESCRIPTION - PAIN TYPE
TYPE: ACUTE PAIN;SURGICAL PAIN
TYPE: ACUTE PAIN;SURGICAL PAIN;PHANTOM PAIN
TYPE: ACUTE PAIN;SURGICAL PAIN
TYPE: ACUTE PAIN
TYPE: ACUTE PAIN;PHANTOM PAIN
TYPE: ACUTE PAIN;SURGICAL PAIN

## 2025-07-16 ASSESSMENT — FIBROSIS 4 INDEX: FIB4 SCORE: 1.32

## 2025-07-16 NOTE — PROGRESS NOTES
Infectious Disease Progress Note    Author: Gabby Monaco M.D. Date & Time of service: 2025  12:36 PM    Chief Complaint:  necrotic foot    Interval History:   70 y.o. male admitted 2025 for necrotic left foot wound with maggots. S/p BKA   AF WBC decreased to 18.7 refusing skin assessment noted   AF WBC 16.4 reconsulted due to Burns blood cultures +group A strep and strep viridans  Labs Reviewed and Medications Reviewed.    Review of Systems:  Review of Systems   Unable to perform ROS: Psychiatric disorder   Constitutional:  Negative for fever.       Hemodynamics:  Temp (24hrs), Av.6 °C (97.8 °F), Min:36.4 °C (97.6 °F), Max:36.6 °C (97.9 °F)  Temperature: 36.6 °C (97.9 °F)  Pulse  Av.3  Min: 66  Max: 114   Blood Pressure : 108/61       Physical Exam:  Physical Exam  Vitals and nursing note reviewed.   Constitutional:       General: He is not in acute distress.     Appearance: He is not toxic-appearing or diaphoretic.   Cardiovascular:      Rate and Rhythm: Normal rate.   Pulmonary:      Effort: Pulmonary effort is normal.   Abdominal:      General: There is no distension.   Musculoskeletal:      Comments: BKA dressed/VAC   Skin:     Coloration: Skin is pale.   Neurological:      General: No focal deficit present.         Meds:    Current Facility-Administered Medications:     oxyCODONE immediate-release **OR** oxyCODONE immediate-release **OR** HYDROmorphone    ampicillin-sulbactam (UNASYN) IV    rivaroxaban    lactulose    acetaminophen    ondansetron    senna-docusate **AND** polyethylene glycol/lytes    baclofen    insulin GLARGINE    insulin lispro **AND** POC blood glucose manual result **AND** NOTIFY MD and PharmD **AND** Administer 20 grams of glucose (approximately 8 ounces of fruit juice) every 15 minutes PRN FSBG less than 70 mg/dL **AND** dextrose bolus    ondansetron    Labs:  Recent Labs     25  0156 07/15/25  0137   WBC 14.6* 16.4*   RBC 4.32* 4.61*   HEMOGLOBIN  11.8* 12.8*   HEMATOCRIT 36.8* 39.9*   MCV 85.2 86.6   MCH 27.3 27.8   RDW 43.8 45.4   PLATELETCT 297 348   MPV 10.4 10.4     Recent Labs     25  0156 07/15/25  0137   SODIUM 136 135   POTASSIUM 4.1 3.9   CHLORIDE 103 102   CO2 25 22   GLUCOSE 141* 128*   BUN 9 11     Recent Labs     25  0156 07/15/25  013   CREATININE 0.44* 0.52       Imaging:  US-ONUR SINGLE LEVEL BILAT  Result Date: 2025   Vascular Laboratory  Conclusions  BILATERAL LOWER EXTREMITY ARTERIES:.  The ankle-brachial indices are normal.  ALISSA NOEL  Age:    70    Gender:     M  MRN:    [id number]  :    1955      BSA:  Exam Date:     2025 13:37  Room #:     Inpatient  Priority:     Routine  Ht (in):             Wt (lb):  Ordering Physician:        GEORGI SOLORIO  Referring Physician:       [id number] Serrato  Sonographer:               Danial Nuñez RVT  Study Type:                Complete Bilateral  Technical Quality:         Good  Indications:     Type 2 diabetes mellitus with foot ulcer  CPT Codes:       81302  ICD Codes:       E11.621  History:         diabetic left lateral oozing foot wound; maggots;                   osteomyelitis; sepsis; prior exam 23  Limitations:                 RIGHT  Waveform            Systolic BPs (mmHg)                             128           Brachial  Triphasic                                Common Femoral  Triphasic                                Popliteal  Triphasic                  166           Posterior Tibial  Triphasic                  138           Dorsalis Pedis                                           Digit                             1.30          ONUR                                           TBI                       LEFT  Waveform        Systolic BPs (mmHg)                             128           Brachial  Triphasic                                Common Femoral  Triphasic                                Popliteal  Triphasic                  140           Posterior Tibial   Triphasic                  136           Dorsalis Pedis                                           Digit                             1.09          ONUR                                           TBI  Findings  BILATERAL LOWER EXTREMITY ARTERIES:  Doppler waveforms of the common femoral artery and popliteal artery are  high amplitude and triphasic.  Doppler waveforms at the ankle are brisk and triphasic.  The ankle-brachial indices are normal.  There is no evidence of significant arterial disease demonstrated on the  right or the left.  Additional testing was not performed in accordance with lower extremity  arterial evaluation protocol.  Benjamín Bhatia MD  (Electronically Signed)  Final Date:      09 July 2025                   21:33    MR-FOOT-WITH & W/O LEFT  Result Date: 7/9/2025 7/8/2025 4:23 PM HISTORY/REASON FOR EXAM:  osteo. Skin ulcer TECHNIQUE/EXAM DESCRIPTION: MRI of the LEFT foot with and without contrast. The study was performed on a [employer] 1.5 Yessica MRI scanner. T1 axial, T1 sagittal, T1 coronal, STIR sagittal, T2 fast spin-echo fat-suppressed coronal, sagittal inversion recovery, and T1 postcontrast coronal images were obtained. 20 mL ProHance contrast was administered intravenously. COMPARISON: 1/3/2023 FINDINGS: There is reticulated low T1 and high T2-weighted signal intensity involving the subcutaneous tissues. This is consistent with edema and may be seen with cellulitis. There is a large gas and fluid collection in the dorsal aspect of the forefoot laterally consistent with abscess. This measures approximately 7.1 cm in transverse dimension, 2.7 cm in dorsal plantar dimension, and 10.9 cm in longitudinal dimension. This wraps around the lateral aspect of the fifth metatarsal and also appears to extend between the fourth and fifth metatarsal bases. Fluid tracks proximally plantar to the fourth metatarsal to the level of the tarsometatarsal joint. There appears to be a wound or ulcer plantar  to the fifth metatarsal head. There is markedly abnormal signal within the fifth metatarsal as well as the proximal and middle phalanges. There is high T2-weighted signal and enhancement with multiple areas of low signal. The low signal may represent intraosseous gas or necrotic bone. There is also some abnormal signal within the fourth metatarsal and proximal phalanx. This may be reactive although additional infection cannot be excluded. There is hallux valgus deformity and first metatarsophalangeal joint osteoarthrosis. There is diffuse T2 hyperintensity and enhancement of the intrinsic foot musculature. This is nonspecific and may be seen with myositis, ischemia, and denervation. There is at least a component of degeneration given fatty infiltration noted on the T1-weighted sequences.     1.  There is a large area of necrotic tissue/abscess in the lateral aspect of the forefoot. 2.  Osteomyelitis with necrosis versus intraosseous gas of the fifth metatarsal and the proximal and middle phalanges. 3.  There is also abnormal signal within the fourth metatarsal and proximal phalanx which may be reactive although additional infection cannot be excluded. 4.  There is diffuse soft tissue edema and enhancement which likely represents cellulitis and myositis.    DX-CHEST-PORTABLE (1 VIEW)  Result Date: 7/8/2025 7/8/2025 11:31 AM HISTORY/REASON FOR EXAM:  Cough Shortness of breath TECHNIQUE/EXAM DESCRIPTION AND NUMBER OF VIEWS: Single portable view of the chest. COMPARISON: None FINDINGS: Elevation of the right hemidiaphragm. Patchy right basilar opacities. No pleural effusion. No pneumothorax. Normal cardiopericardial silhouette.     Elevation of the right hemidiaphragm. Patchy right basilar opacities, atelectasis or infection.      Micro:  Results       Procedure Component Value Units Date/Time    BLOOD CULTURE [[id number]] Collected: 07/15/25 0839    Order Status: Completed Specimen: Blood from Peripheral Updated:  07/15/25 1019     Significant Indicator NEG     Source BLD     Site PERIPHERAL     Culture Result No Growth  Note: Blood cultures are incubated for 5 days and  are monitored continuously.Positive blood cultures  are called to the RN and reported as soon as  they are identified.  Blood culture testing and Gram stain, if indicated, are  performed at [facility],  [address] [place] [address].  Positive blood  cultures are sent to [facility],  [address] [place] [address], for organism identification  and susceptibility testing.      BLOOD CULTURE [[id number]] Collected: 07/15/25 0839    Order Status: Completed Specimen: Blood from Peripheral Updated: 07/15/25 1019     Significant Indicator NEG     Source BLD     Site PERIPHERAL     Culture Result No Growth  Note: Blood cultures are incubated for 5 days and  are monitored continuously.Positive blood cultures  are called to the RN and reported as soon as  they are identified.  Blood culture testing and Gram stain, if indicated, are  performed at [facility],  [address] [place] [address].  Positive blood  cultures are sent to [facility],  [address] [place] [address], for organism identification  and susceptibility testing.      Fungal Culture [[id number]] Collected: 07/09/25 1614    Order Status: Completed Specimen: Wound Updated: 07/13/25 1152     Significant Indicator NEG     Source WND     Site Left leg #1     Culture Result Culture in progress.     Fungal Smear Results No fungal elements seen.    CULTURE WOUND W/ GRAM STAIN [[id number]]  (Abnormal) Collected: 07/09/25 1614    Order Status: Completed Specimen: Wound Updated: 07/13/25 1152     Significant Indicator POS     Source WND     Site Left leg #1     Culture Result -     Gram Stain Result Many WBCs.  Moderate Gram positive cocci.  Few Gram negative rods.       Culture Result Streptococcus constellatus  Heavy  growth      Anaerobic Culture [[id number]]  (Abnormal) Collected: 07/09/25 1614    Order Status: Completed Specimen: Wound Updated: 07/13/25 1152     Significant Indicator POS     Source WND     Site Left leg #1     Culture Result -      Bacteroides fragilis  Heavy growth      GRAM STAIN [[id number]] Collected: 07/09/25 1614    Order Status: Completed Specimen: Wound Updated: 07/10/25 1436     Significant Indicator .     Source WND     Site Left leg #1     Gram Stain Result Many WBCs.  Moderate Gram positive cocci.  Few Gram negative rods.      Fungal Smear [[id number]] Collected: 07/09/25 1614    Order Status: Completed Specimen: Wound Updated: 07/10/25 1436     Significant Indicator NEG     Source WND     Site Left leg #1     Fungal Smear Results No fungal elements seen.    AFB CULTURE AND STAIN (ACID-FAST BACILLUS) [[id number]] Collected: 07/09/25 1614    Order Status: Canceled             Assessment:  Active Hospital Problems    Diagnosis     *Diabetic foot infection (HCC) [E11.628, L08.9]     Hiccups [R06.6]     Altered mental state [R41.82]     Sepsis (HCC) [A41.9]     Self neglect [R46.89]     Acute hypoxic respiratory failure (HCC) [J96.01]     Type 2 diabetes mellitus with circulatory disorder, without long-term current use of insulin (HCC) [E11.59]     Type 2 diabetes mellitus with diabetic polyneuropathy (HCC) [E11.42]      Necrotic foot with maggots  -Purulence seen in fascia  -current on tetanus-last given 2023  -operative culture 7/9 strep and Bacteroides  Bacteremia vs pseudobacteremia in blood cultures from [facility]  -+strep viridans 7/7  Psych disorder  Noncompliance-has pulled out IV already  Leukocytosis, decreased     PLAN:   Purulence seen in anterior soft tissue-no necrosis stump per op note  Recommend revision BKA for definitive treatment if concern for residual bone infection    Agree with Unasyn  FU repeat blood cultures  Need wound pictures from next VAC change    DW Dr Carrasco

## 2025-07-16 NOTE — CARE PLAN
The patient is Stable - Low risk of patient condition declining or worsening    Shift Goals  Clinical Goals: monitor wound vac, iv abx, monitor vitals, monitor labs  Patient Goals: sleep  Family Goals: lydia    Progress made toward(s) clinical / shift goals:        Problem: Knowledge Deficit - Standard  Goal: Patient and family/care givers will demonstrate understanding of plan of care, disease process/condition, diagnostic tests and medications  Description: Target End Date:  1-3 days or as soon as patient condition allows    Document in Patient Education    1.  Patient and family/caregiver oriented to unit, equipment, visitation policy and means for communicating concern  2.  Complete/review Learning Assessment  3.  Assess knowledge level of disease process/condition, treatment plan, diagnostic tests and medications  4.  Explain disease process/condition, treatment plan, diagnostic tests and medications  Outcome: Progressing     Problem: Skin Integrity  Goal: Skin integrity is maintained or improved  Description: Target End Date:  Prior to discharge or change in level of care    Document interventions on Skin Risk/Chalo flowsheet groups and corresponding LDA    1.  Assess and monitor skin integrity, appearance and/or temperature  2.  Assess risk factors for impaired skin integrity and/or pressures ulcers  3.  Implement precautions to protect skin integrity in collaboration with interdisciplinary team  4.  Implement pressure ulcer prevention protocol if at risk for skin breakdown  5.  Confirm wound care consult if at risk for skin breakdown  6.  Ensure patient use of pressure relieving devices  (Low air loss bed, waffle overlay, heel protectors, ROHO cushion, etc)  Outcome: Progressing     Problem: Fall Risk  Goal: Patient will remain free from falls  Description: Target End Date:  Prior to discharge or change in level of care    Document interventions on the Kaelyn Mandujano Fall Risk Assessment    1.  Assess for fall  risk factors  2.  Implement fall precautions  Outcome: Progressing     Problem: Pain - Standard  Goal: Alleviation of pain or a reduction in pain to the patient’s comfort goal  Description: Target End Date:  Prior to discharge or change in level of care    Document on Vitals flowsheet    1.  Document pain using the appropriate pain scale per order or unit policy  2.  Educate and implement non-pharmacologic comfort measures (i.e. relaxation, distraction, massage, cold/heat therapy, etc.)  3.  Pain management medications as ordered  4.  Reassess pain after pain med administration per policy  5.  If opiods administered assess patient's response to pain medication is appropriate per POSS sedation scale  6.  Follow pain management plan developed in collaboration with patient and interdisciplinary team (including palliative care or pain specialists if applicable)  Outcome: Progressing       Patient is not progressing towards the following goals:

## 2025-07-16 NOTE — PROGRESS NOTES
Hospital Medicine Daily Progress Note    Date of Service  7/15/2025    Chief Complaint  Jimmy Amaya is a 70 y.o. male admitted 7/7/2025 with left leg infection with maggots and osteomyelitis    Hospital Course  Patient is a 70-year-old male with chronic diabetes uncontrolled with minimal medical compliance lives alone in a trailer.  He drove himself to the hospital in [place] secondary to the wound on his foot and while in [place] was told that he needed to be flown to [place] for amputation and at that point he signed himself out AGAINST MEDICAL ADVICE and apparently got in his car and drove away.   was called by the hospital for a wellness check and patient was found with his car parked on the side of the freeway and was wandering in the middle of the freeway in [place] when he was picked back up again.  He was taken back to the hospital and then sent here for further evaluation.  The wounds on his foot are covered with maggots which are draining from the abscess.  Initially he was admitted to the intensive care unit and started on aggressive IV antibiotics.  Patient downgraded from the intensive care unit to the telemetry unit and orthopedic surgery consulted.  Patient has osteomyelitis in the 5th and 4th metatarsal on MRI in addition to the severe macerated skin and muscle on the left lateral border.  Patient to be taken to the OR for amputation below the knee.    Interval Problem Update  Pt still has pain, oxycodone 10mg helping. I explained to patient we should try lower dose and see how controlled his pain is.    WBC remains elevated 16.4  I restarted patient on IV Unasyn to cover Strep and Bacteroides.   Reaching out to [facility] for re-evaluation on blood cultures positive at [place].  Charge RN Elena was able to help and get the cultures from [facility]. 2 of 2 sets growing Strep viridans group.      1st blood culture      2nd blood culture    Addendum: I called patient's daughter  Gabby White.  I gave updates on new infection finding and treatment plan.  We are holding off on [facility] for now until we ensure patient is clear from Streptococcus in his bloodstream.    I have discussed this patient's plan of care and discharge plan at IDT rounds today with Case Management, Nursing, Nursing leadership, and other members of the IDT team.    Consultants/Specialty  infectious disease and orthopedics    Code Status  Full Code    Disposition  The patient is not medically cleared for discharge to home or a post-acute facility.  Anticipate discharge to: skilled nursing facility    I have placed the appropriate orders for post-discharge needs.    Review of Systems  Review of Systems   Constitutional:  Negative for chills, fever and malaise/fatigue.   Respiratory:  Negative for cough and shortness of breath.    Cardiovascular:  Negative for chest pain and palpitations.   Gastrointestinal:  Negative for abdominal pain, constipation, diarrhea, nausea and vomiting.   Musculoskeletal:  Positive for joint pain (left leg, knee). Negative for back pain.   Neurological:  Negative for dizziness and headaches.   All other systems reviewed and are negative.       Physical Exam  Temp:  [36.2 °C (97.2 °F)-36.6 °C (97.8 °F)] 36.2 °C (97.2 °F)  Pulse:  [66-81] 73  Resp:  [16-18] 18  BP: (123-134)/(66-88) 131/66  SpO2:  [90 %-94 %] 91 %    Physical Exam  Vitals and nursing note reviewed.   Constitutional:       General: He is not in acute distress.     Appearance: He is not diaphoretic.   HENT:      Mouth/Throat:      Mouth: Mucous membranes are dry.      Pharynx: No oropharyngeal exudate.   Cardiovascular:      Rate and Rhythm: Normal rate and regular rhythm.      Pulses: Normal pulses.      Heart sounds: Normal heart sounds. No murmur heard.  Pulmonary:      Effort: Pulmonary effort is normal. No respiratory distress.      Breath sounds: Normal breath sounds. No wheezing or rales.   Abdominal:      General:  Bowel sounds are normal. There is no distension.      Tenderness: There is no abdominal tenderness.   Musculoskeletal:         General: No swelling or tenderness. Normal range of motion.      Comments: Left leg BKA, wrapped in dry gauze, wound VAC in place.   Skin:     General: Skin is warm.      Capillary Refill: Capillary refill takes less than 2 seconds.      Coloration: Skin is not jaundiced or pale.   Neurological:      General: No focal deficit present.      Mental Status: He is alert and oriented to person, place, and time. Mental status is at baseline.      Motor: No weakness.   Psychiatric:         Mood and Affect: Mood normal.         Behavior: Behavior normal.         Thought Content: Thought content normal.         Judgment: Judgment normal.         Fluids    Intake/Output Summary (Last 24 hours) at 7/15/2025 1846  Last data filed at 7/14/2025 2224  Gross per 24 hour   Intake --   Output 1600 ml   Net -1600 ml        Laboratory  Recent Labs     07/13/25  0311 07/14/25  0156 07/15/25  0137   WBC 15.6* 14.6* 16.4*   RBC 4.89 4.32* 4.61*   HEMOGLOBIN 13.6* 11.8* 12.8*   HEMATOCRIT 41.8* 36.8* 39.9*   MCV 85.5 85.2 86.6   MCH 27.8 27.3 27.8   MCHC 32.5 32.1* 32.1*   RDW 43.0 43.8 45.4   PLATELETCT 264 297 348   MPV 10.7 10.4 10.4     Recent Labs     07/13/25  0311 07/14/25  0156 07/15/25  0137   SODIUM 133* 136 135   POTASSIUM 4.4 4.1 3.9   CHLORIDE 98 103 102   CO2 26 25 22   GLUCOSE 149* 141* 128*   BUN 13 9 11   CREATININE 0.58 0.44* 0.52   CALCIUM 8.6 8.4* 8.4*                   Imaging  US-ONUR SINGLE LEVEL BILAT   Final Result      MR-FOOT-WITH & W/O LEFT   Final Result      1.  There is a large area of necrotic tissue/abscess in the lateral aspect of the forefoot.   2.  Osteomyelitis with necrosis versus intraosseous gas of the fifth metatarsal and the proximal and middle phalanges.   3.  There is also abnormal signal within the fourth metatarsal and proximal phalanx which may be reactive although  additional infection cannot be excluded.   4.  There is diffuse soft tissue edema and enhancement which likely represents cellulitis and myositis.      DX-CHEST-PORTABLE (1 VIEW)   Final Result      Elevation of the right hemidiaphragm. Patchy right basilar opacities, atelectasis or infection.           Assessment/Plan  * Diabetic foot infection (HCC)- (present on admission)  Assessment & Plan  Hx of prior wounds on the foot now worse, XR showing osteo, 4/4 SIRS given sepsis bolus at OSH not hypotensive  Continue with cefepime  Purulent track age at the anterior portion of his leg discovered during BKA, area of purulence was washed extensively and cultures obtained and wound incision VAC in place.    I reconsulted [facility].  New results from [facility] showing patient has bacteremia with Streptococcus viridans on 2 sets  I restarted patient on IV Unasyn  Wound cultures growing Streptococcus constellatus and Bacteroides fragilis    Bacteremia- (present on admission)  Assessment & Plan  WBC 16.4, has not downward trended.  Increasing while on oral Augmentin.  We reached out to [facility], 2 of 2 sets growing Strep viridans group. Information scanned to Media.  I restarted IV Unasyn  I asked [facility] Dr. Monaco for a re-evaluation  I ordered new blood cultures this morning    Constipation- (present on admission)  Assessment & Plan  Last bowel movement 7/7  Bowel protocol in place  No results after Dulcolax, start lactulose    7/15: Patient had 5 bowel movements overnight.    Hiccups  Assessment & Plan  Baclofen PRN, helping        Acute hypoxic respiratory failure (HCC)- (present on admission)  Assessment & Plan  On room air    Self neglect- (present on admission)  Assessment & Plan  Patient currently does not have decision making capacity, daughter is NOK for consents  Capacity eval done by psychiatry    Sepsis (HCC)- (present on admission)  Assessment & Plan  This is Sepsis  Present on admission  SIRS criteria identified on my evaluation include: Fever, with temperature greater than 100.9 deg F, Tachycardia, with heart rate greater than 90 BPM, Tachypnea, with respirations greater than 20 per minute, and Leukocytosis, with WBC greater than 12,000  Clinical indicators of end organ dysfunction include Lactic Acid greater than 2  Source is osteo L foot and bacteremia with Streptococcus viridans  Sepsis protocol initiated  Crystalloid Fluid Administration: Fluid resuscitation ordered per standard protocol - 30 mL/kg per current or ideal body weight at OSH given   antibiotics as appropriate for source of sepsis  Reassessment: I have reassessed the patient's hemodynamic status    WBC 16.4, blood cultures positive at [facility]  Restarting patient on IV Unasyn, stopped Augmentin    Altered mental state- (present on admission)  Assessment & Plan  Exacerbated by his infxn, concern for baseline mentation decline per sister, house is full of garbage and pt had maggots in his wound found wandering highway    Patient is back to his baseline    Type 2 diabetes mellitus with diabetic polyneuropathy (HCC)- (present on admission)  Assessment & Plan  Uncontrolled due to noncompliance, no sign of DKA/HOS  Continue glargine 15 units nightly  Continue insulin sliding scale, Accu-Cheks and hypoglycemic protocol         VTE prophylaxis:    heparin ppx      I have performed a physical exam and reviewed and updated ROS and Plan today (7/15/2025). In review of yesterday's note (7/14/2025), there are no changes except as documented above.      Total time spent 51 minutes. I spent greater than 50% of the time for patient care, including unit/floor time, multiple face-to-face encounters with the patient, counseling on treatment plan and discussion with bedside RN.  Further, I spent time on my own review of patient's overnight events, RN notes, imaging and lab analysis, and developing my assessment and  plan above.  In addition, working with , social workers, and Charge RN on case coordination on this date.    This note was generated using voice recognition software which has a chance of producing errors of grammar and content.  I have made every reasonable attempt to find and correct any errors, but it should be expected that some may not be found prior to finalization of this note.

## 2025-07-16 NOTE — ASSESSMENT & PLAN NOTE
WBC 16.4, has not downward trended.  Increasing while on oral Augmentin.  We reached out to [facility], 2 of 2 sets growing Strep viridans group. Information scanned to Media.  Continue Bcx  Repeat Bcx NGTD  Could be real bacteremia vs pseudobacteremia.  I discussed with ID Dr. Jamison today.  continue IV Unasyn, end date 7/19.  Patient should be able to go to [place] and complete IV Unasyn for 1 or 2 more days then switch to oral Augmentin with a end date of 8/6/2025. I asked case management to confirm with [facility].

## 2025-07-16 NOTE — DIETARY
Nutrition Services: Follow-up for Nutrition Care Plan   Day 9 of admit. Jimmy Amaya is a 70 y.o. male with admitting DX of Diabetic foot infection (HCC) [E11.628, L08.9]    GI: LBM on 7/15    Current diet order:   Consistent CHO diet  Glucerna (provides 220 calories, 10 g protein per 8 fl oz) daily and cottage cheese and fruit with dinner    Recorded PO intake has improved to 50-75% x 1 and % x 1.      Nutrition Dx: Increased nutrient needs related to healing needs as evidenced by diabetic foot wound, S/P BKA.      Nutrition Dx Status: resolved     Problem: Nutritional:  Goal: Achieve adequate nutritional intake  Description: Patient will consume >50% of meals  Outcome: met      Plan/ Recommendations:      Encourage intake of meals, goal for >50% consumption from meals and/or supplements  Document intake of all meals as % taken in ADLs to provide interdisciplinary communication across all shifts.   Monitor weight.  Nutrition rep available upon request to see patient for ongoing meal and snack preferences.       RD following

## 2025-07-16 NOTE — PROGRESS NOTES
Hospital Medicine Daily Progress Note    Date of Service  7/16/2025    Chief Complaint  Jimmy Amaya is a 70 y.o. male admitted 7/7/2025 with left leg infection with maggots and osteomyelitis    Hospital Course  Patient is a 70-year-old male with chronic diabetes uncontrolled with minimal medical compliance lives alone in a trailer.  He drove himself to the hospital in [place] secondary to the wound on his foot and while in [place] was told that he needed to be flown to [place] for amputation and at that point he signed himself out AGAINST MEDICAL ADVICE and apparently got in his car and drove away.   was called by the hospital for a wellness check and patient was found with his car parked on the side of the freeway and was wandering in the middle of the freeway in [place] when he was picked back up again.  He was taken back to the hospital and then sent here for further evaluation.  The wounds on his foot are covered with maggots which are draining from the abscess.  Initially he was admitted to the intensive care unit and started on aggressive IV antibiotics.  Patient downgraded from the intensive care unit to the telemetry unit and orthopedic surgery consulted.  Patient has osteomyelitis in the 5th and 4th metatarsal on MRI in addition to the severe macerated skin and muscle on the left lateral border.  Patient to be taken to the OR for amputation below the knee.    Interval Problem Update  7/16:  Patient had no acute complaints today  I discussed with Dr. Monaco, [facility], we will continue IV Unasyn.  Will wait on repeat cultures taken on 7/15.  This is possibly a true bacteremia versus pseudo bacteremia given strep viridans.  Unfortunately unable to discharge to [facility] until we clear patient's bacteremia  Wound VAC to be changed, I placed wound care consult, they were consulted on 7/9 but deferred wound care to orthopedic surgeon's recommendation after surgery.  Dr. Youngblood wrote to discontinue  DAGOBERTO drain on POD2.       7/15:  Pt still has pain, oxycodone 10mg helping. I explained to patient we should try lower dose and see how controlled his pain is.    WBC remains elevated 16.4  I restarted patient on IV Unasyn to cover Strep and Bacteroides.   Reaching out to [facility] for re-evaluation on blood cultures positive at [place].  Charge RN Elena was able to help and get the cultures from [facility]. 2 of 2 sets growing Strep viridans group.      Addendum: I called patient's daughter Gabby White.  I gave updates on new infection finding and treatment plan.  We are holding off on [facility] for now until we ensure patient is clear from Streptococcus in his bloodstream.    I have discussed this patient's plan of care and discharge plan at IDT rounds today with Case Management, Nursing, Nursing leadership, and other members of the IDT team.    Consultants/Specialty  infectious disease and orthopedics    Code Status  Full Code    Disposition  The patient is not medically cleared for discharge to home or a post-acute facility.  Anticipate discharge to: skilled nursing facility    I have placed the appropriate orders for post-discharge needs.    Review of Systems  Review of Systems   Constitutional:  Negative for chills, fever and malaise/fatigue.   Respiratory:  Negative for cough and shortness of breath.    Cardiovascular:  Negative for chest pain and palpitations.   Gastrointestinal:  Negative for abdominal pain, constipation, diarrhea, nausea and vomiting.   Musculoskeletal:  Positive for joint pain (left leg, knee). Negative for back pain.   Neurological:  Negative for dizziness and headaches.   All other systems reviewed and are negative.       Physical Exam  Temp:  [36.4 °C (97.6 °F)-36.6 °C (97.9 °F)] 36.6 °C (97.9 °F)  Pulse:  [73-82] 82  Resp:  [18-19] 19  BP: (108-131)/(61-78) 108/61  SpO2:  [91 %-94 %] 94 %    Physical Exam  Vitals and nursing note reviewed.    Constitutional:       General: He is not in acute distress.     Appearance: He is not diaphoretic.   HENT:      Mouth/Throat:      Mouth: Mucous membranes are dry.      Pharynx: No oropharyngeal exudate.   Cardiovascular:      Rate and Rhythm: Normal rate and regular rhythm.      Pulses: Normal pulses.      Heart sounds: Normal heart sounds. No murmur heard.  Pulmonary:      Effort: Pulmonary effort is normal. No respiratory distress.      Breath sounds: Normal breath sounds. No wheezing or rales.   Abdominal:      General: Bowel sounds are normal. There is no distension.      Tenderness: There is no abdominal tenderness.   Musculoskeletal:         General: No swelling or tenderness. Normal range of motion.      Comments: Left leg BKA, wrapped in dry gauze, wound VAC in place.   Skin:     General: Skin is warm.      Capillary Refill: Capillary refill takes less than 2 seconds.      Coloration: Skin is not jaundiced or pale.   Neurological:      General: No focal deficit present.      Mental Status: He is alert and oriented to person, place, and time. Mental status is at baseline.      Motor: No weakness.   Psychiatric:         Mood and Affect: Mood normal.         Behavior: Behavior normal.         Thought Content: Thought content normal.         Judgment: Judgment normal.         Fluids    Intake/Output Summary (Last 24 hours) at 7/16/2025 1317  Last data filed at 7/16/2025 0630  Gross per 24 hour   Intake 465 ml   Output 1600 ml   Net -1135 ml        Laboratory  Recent Labs     07/14/25  0156 07/15/25  0137   WBC 14.6* 16.4*   RBC 4.32* 4.61*   HEMOGLOBIN 11.8* 12.8*   HEMATOCRIT 36.8* 39.9*   MCV 85.2 86.6   MCH 27.3 27.8   MCHC 32.1* 32.1*   RDW 43.8 45.4   PLATELETCT 297 348   MPV 10.4 10.4     Recent Labs     07/14/25  0156 07/15/25  0137   SODIUM 136 135   POTASSIUM 4.1 3.9   CHLORIDE 103 102   CO2 25 22   GLUCOSE 141* 128*   BUN 9 11   CREATININE 0.44* 0.52   CALCIUM 8.4* 8.4*                    Imaging  US-ONUR SINGLE LEVEL BILAT   Final Result      MR-FOOT-WITH & W/O LEFT   Final Result      1.  There is a large area of necrotic tissue/abscess in the lateral aspect of the forefoot.   2.  Osteomyelitis with necrosis versus intraosseous gas of the fifth metatarsal and the proximal and middle phalanges.   3.  There is also abnormal signal within the fourth metatarsal and proximal phalanx which may be reactive although additional infection cannot be excluded.   4.  There is diffuse soft tissue edema and enhancement which likely represents cellulitis and myositis.      DX-CHEST-PORTABLE (1 VIEW)   Final Result      Elevation of the right hemidiaphragm. Patchy right basilar opacities, atelectasis or infection.           Assessment/Plan  * Diabetic foot infection (HCC)- (present on admission)  Assessment & Plan  Hx of prior wounds on the foot now worse, XR showing osteo, 4/4 SIRS given sepsis bolus at OSH not hypotensive  Continue with cefepime  Purulent track age at the anterior portion of his leg discovered during BKA, area of purulence was washed extensively and cultures obtained and wound incision VAC in place.    [facility] showing patient has bacteremia with Streptococcus viridans on 2 sets  Wound cultures growing Streptococcus constellatus and Bacteroides fragilis  Wound VAC to be changed, I placed wound care consult, they were consulted on 7/9 but deferred wound care to orthopedic surgeon's recommendation after surgery.  Dr. Youngblood wrote to discontinue DAGOBERTO drain on POD2.     Bacteremia- (present on admission)  Assessment & Plan  WBC 16.4, has not downward trended.  Increasing while on oral Augmentin.  We reached out to [facility], 2 of 2 sets growing Strep viridans group. Information scanned to Media.  Continue IV Unasyn  Pending repeat Bcx  I discussed with Dr. Monaco today. Could be real bacteremia vs pseudobacteremia.    Sepsis (HCC)- (present on  admission)  Assessment & Plan  This is Sepsis Present on admission  SIRS criteria identified on my evaluation include: Fever, with temperature greater than 100.9 deg F, Tachycardia, with heart rate greater than 90 BPM, Tachypnea, with respirations greater than 20 per minute, and Leukocytosis, with WBC greater than 12,000  Clinical indicators of end organ dysfunction include Lactic Acid greater than 2  Source is osteo L foot and bacteremia with Streptococcus viridans  Sepsis protocol initiated  Crystalloid Fluid Administration: Fluid resuscitation ordered per standard protocol - 30 mL/kg per current or ideal body weight at OSH given   antibiotics as appropriate for source of sepsis  Reassessment: I have reassessed the patient's hemodynamic status    WBC 16.4, blood cultures positive at [facility]  Continue IV Unasyn  Repeat CBC tomorrow  Pending repeat BCx    Constipation- (present on admission)  Assessment & Plan  Last bowel movement 7/7  Bowel protocol in place  No results after Dulcolax, start lactulose    7/15: Patient had 5 bowel movements overnight.  7/16: monitoring for BM today. I reduced PRN opioid doses.    Hiccups  Assessment & Plan  Baclofen PRN, helping        Acute hypoxic respiratory failure (HCC)- (present on admission)  Assessment & Plan  On room air    Self neglect- (present on admission)  Assessment & Plan  Patient currently does not have decision making capacity, daughter is NOK for consents  Capacity eval done by psychiatry    Altered mental state- (present on admission)  Assessment & Plan  Exacerbated by his infxn, concern for baseline mentation decline per sister, house is full of garbage and pt had maggots in [place]    Patient is back to his baseline    Type 2 diabetes mellitus with diabetic polyneuropathy (HCC)- (present on admission)  Assessment & Plan  Uncontrolled due to noncompliance, no sign of DKA/HOS  Continue glargine 15 units nightly  Continue  insulin sliding scale, Accu-Cheks and hypoglycemic protocol         VTE prophylaxis:    Xarelto 10mg daily as prophylaxis      I have performed a physical exam and reviewed and updated ROS and Plan today (7/16/2025). In review of yesterday's note (7/15/2025), there are no changes except as documented above.      Total time spent 52 minutes.    This included my review of patient's overnight RN notes, face to face interview, physical examination, lab analysis.  Also includes repeat visits with the patient, and my documented assessments and interventions above.  I have spoken with specialist from infectious disease.  In addition, I spoke with entire care team on patient's treatment plan, and DC planning on morning rounds and IDT rounds.    This note was generated using voice recognition software which has a chance of producing errors of grammar and content.  I have made every reasonable attempt to find and correct any errors, but it should be expected that some may not be found prior to finalization of this note.

## 2025-07-16 NOTE — THERAPY
"Physical Therapy   Daily Treatment     Patient Name:  Jimmy Amaya  Age:  70 y.o., Sex:  male  Medical Record #:  [id number]  Today's Date: 7/15/2025     Precautions  Medical: Fall Risk  Orthopedic:  (l bka)  Surgical: Wound Vac  Weight Bearing: Non Weight Bearing Left Lower Extremity    Assessment    Pt agreeable and motivated to participate. Reports feeling very motivated to improve strength. Pt noted to have overall improvement in functional mobility, requiring CGA primarily due to impaired safety awareness with line management. Educated pt on continuing to request assistance for mobility due to fall risk. Instructed pt on exercises, including supine SLR, supine R LE bridge, supine L quad set, R sidelying L hip abduction AROM, L hip circles, L hip extension AROM, seated LAQs. Pt demonstrates good understanding of all exercises. Verbal cues with sit to stand transfer training and stand pivot transfer training for sequencing and safety.     Plan    Treatment Plan Status:    Type of Treatment: Bed Mobility, Family / Caregiver Training, Equipment, Gait Training, Self Care / Home Evaluation, Therapeutic Activities, Therapeutic Exercise, Neuro Re-Education / Balance  Treatment Frequency: 5 Times per Week  Treatment Duration: Until Therapy Goals Met    DC Equipment Recommendations: Front-Wheel Walker, Wheelchair  Discharge Recommendations: Recommend post-acute placement for additional physical therapy services prior to discharge home      Subjective    \"Sure, I would love to know some exercises that I can do\"     Objective       07/15/25 1741   Time In/Time Out   Therapy Start Time 1711   Therapy End Time 1741   Total Therapy Time 30   Charge Group   Charges  Yes   PT Therapeutic Activities (Units) 1   PT Therapeutic Exercise (Units) 1   Total Time Spent   PT Total Time Yes   PT Therapeutic Activities Time Spent (Mins) 15   PT Therapeutic Exercise Time Spent (Mins) 15   PT Total Time Spent (Calculated) 30 "   Precautions   Medical Fall Risk   Orthopedic   (l bka)   Surgical Wound Vac   Weight Bearing Non Weight Bearing Left Lower Extremity   Vitals   O2 Delivery Device None - Room Air   Pain 0 - 10 Group   Therapist Pain Assessment 0   Cognition    Level of Consciousness Alert   Passive ROM Lower Body   Comments Full L knee extension PROM   Active ROM Lower Body    Active ROM Lower Body    (Able to fully extend L knee against gravity)   Balance   Sitting Balance (Static) Good   Sitting Balance (Dynamic) Good   Standing Balance (Static) Fair -   Standing Balance (Dynamic) Poor   Weight Shift Sitting Good   Bed Mobility    Supine to Sit Supervised   Sit to Supine Supervised   Scooting Supervised   Rolling Supervised   Skilled Intervention Verbal Cuing   Comments   (Supervision for line management)   Functional Mobility   Sit to Stand Contact Guard Assist   Bed, Chair, Wheelchair Transfer Contact Guard Assist   Transfer Method Stand Pivot   Mobility Bed mobility, sit to stand, stand pivot EOB <-> chair   Skilled Intervention Verbal Cuing  (Continual verbal cues for safety awareness and line management)   6 Clicks Assessment - How much HELP from from another person do you currently need... (If the patient hasn't done an activity recently, how much help from another person do you think he/she would need if he/she tried?)   Turning from your back to your side while in a flat bed without using bedrails? 4   Moving from lying on your back to sitting on the side of a flat bed without using bedrails? 4   Moving to and from a bed to a chair (including a wheelchair)? 4   Standing up from a chair using your arms (e.g., wheelchair, or bedside chair)? 3   Walking in hospital room? 1   Climbing 3-5 steps with a railing? 1   6 clicks Mobility Score 17   Short Term Goals    Short Term Goal # 1 Pt will improve bed mobility to CGA.   Goal Outcome # 1 Goal met   Short Term Goal # 2 Pt will be able to complete sit to stand transfer safely  with CGA with use of FWW.   Goal Outcome # 2 Goal met   Short Term Goal # 3 Pt will be able to complete bed to chair transfer with CGA with FWW.   Goal Outcome # 3 Goal met, new goal added   Short Term Goal # 3 B Pt will be able to complete all transfers with SBA   Education Group   Education Provided Exercises - Supine;Exercises - Seated   Role of Physical Therapist Patient Response Patient;Acceptance;Explanation;Verbal Demonstration   Exercises - Supine Patient Response Patient;Acceptance;Explanation;Demonstration;Handout;Verbal Demonstration;Action Demonstration   Exercises - Seated Patient Response Patient;Acceptance;Explanation;Demonstration;Handout;Verbal Demonstration;Action Demonstration   Interdisciplinary Plan of Care Collaboration   IDT Collaboration with  Nursing   Patient Position at End of Therapy In Bed   Collaboration Comments 7/15: notified nursing of pt progress   Session Information   Date / Session Number  7/15, 2 (2/5, 7/18)

## 2025-07-16 NOTE — CARE PLAN
The patient is Stable - Low risk of patient condition declining or worsening    Shift Goals  Clinical Goals: IV abx, monitor pain  Patient Goals: sleep  Family Goals: lydia    Progress made toward(s) clinical / shift goals:      Problem: Knowledge Deficit - Standard  Goal: Patient and family/care givers will demonstrate understanding of plan of care, disease process/condition, diagnostic tests and medications  Outcome: Progressing     Problem: Skin Integrity  Goal: Skin integrity is maintained or improved  Outcome: Progressing     Problem: Fall Risk  Goal: Patient will remain free from falls  Outcome: Progressing     Problem: Pain - Standard  Goal: Alleviation of pain or a reduction in pain to the patient’s comfort goal  Outcome: Progressing       Patient is not progressing towards the following goals:

## 2025-07-17 LAB
ALBUMIN SERPL BCP-MCNC: 2.8 G/DL (ref 3.2–4.9)
ANION GAP SERPL CALC-SCNC: 8 MMOL/L (ref 7–16)
BUN SERPL-MCNC: 9 MG/DL (ref 8–22)
CALCIUM ALBUM COR SERPL-MCNC: 9.6 MG/DL (ref 8.5–10.5)
CALCIUM SERPL-MCNC: 8.6 MG/DL (ref 8.5–10.5)
CHLORIDE SERPL-SCNC: 102 MMOL/L (ref 96–112)
CO2 SERPL-SCNC: 26 MMOL/L (ref 20–33)
CREAT SERPL-MCNC: 0.64 MG/DL (ref 0.5–1.4)
ERYTHROCYTE [DISTWIDTH] IN BLOOD BY AUTOMATED COUNT: 46 FL (ref 35.9–50)
GFR SERPLBLD CREATININE-BSD FMLA CKD-EPI: 102 ML/MIN/1.73 M 2
GLUCOSE BLD STRIP.AUTO-MCNC: 112 MG/DL (ref 65–99)
GLUCOSE BLD STRIP.AUTO-MCNC: 162 MG/DL (ref 65–99)
GLUCOSE BLD STRIP.AUTO-MCNC: 163 MG/DL (ref 65–99)
GLUCOSE SERPL-MCNC: 136 MG/DL (ref 65–99)
HCT VFR BLD AUTO: 40 % (ref 42–52)
HGB BLD-MCNC: 12.6 G/DL (ref 14–18)
MAGNESIUM SERPL-MCNC: 2.1 MG/DL (ref 1.5–2.5)
MCH RBC QN AUTO: 27.6 PG (ref 27–33)
MCHC RBC AUTO-ENTMCNC: 31.5 G/DL (ref 32.3–36.5)
MCV RBC AUTO: 87.5 FL (ref 81.4–97.8)
PHOSPHATE SERPL-MCNC: 3.1 MG/DL (ref 2.5–4.5)
PLATELET # BLD AUTO: 382 K/UL (ref 164–446)
PMV BLD AUTO: 9.6 FL (ref 9–12.9)
POTASSIUM SERPL-SCNC: 4.4 MMOL/L (ref 3.6–5.5)
RBC # BLD AUTO: 4.57 M/UL (ref 4.7–6.1)
SODIUM SERPL-SCNC: 136 MMOL/L (ref 135–145)
WBC # BLD AUTO: 10.6 K/UL (ref 4.8–10.8)

## 2025-07-17 PROCEDURE — A9270 NON-COVERED ITEM OR SERVICE: HCPCS | Performed by: INTERNAL MEDICINE

## 2025-07-17 PROCEDURE — 36415 COLL VENOUS BLD VENIPUNCTURE: CPT

## 2025-07-17 PROCEDURE — 83735 ASSAY OF MAGNESIUM: CPT

## 2025-07-17 PROCEDURE — 99233 SBSQ HOSP IP/OBS HIGH 50: CPT | Performed by: INTERNAL MEDICINE

## 2025-07-17 PROCEDURE — 770001 HCHG ROOM/CARE - MED/SURG/GYN PRIV*

## 2025-07-17 PROCEDURE — A9270 NON-COVERED ITEM OR SERVICE: HCPCS | Performed by: HOSPITALIST

## 2025-07-17 PROCEDURE — 80069 RENAL FUNCTION PANEL: CPT

## 2025-07-17 PROCEDURE — 700105 HCHG RX REV CODE 258: Performed by: INTERNAL MEDICINE

## 2025-07-17 PROCEDURE — 700111 HCHG RX REV CODE 636 W/ 250 OVERRIDE (IP): Mod: JZ | Performed by: INTERNAL MEDICINE

## 2025-07-17 PROCEDURE — 85027 COMPLETE CBC AUTOMATED: CPT

## 2025-07-17 PROCEDURE — 700102 HCHG RX REV CODE 250 W/ 637 OVERRIDE(OP): Performed by: INTERNAL MEDICINE

## 2025-07-17 PROCEDURE — 700102 HCHG RX REV CODE 250 W/ 637 OVERRIDE(OP): Performed by: HOSPITALIST

## 2025-07-17 PROCEDURE — 94760 N-INVAS EAR/PLS OXIMETRY 1: CPT

## 2025-07-17 PROCEDURE — 82962 GLUCOSE BLOOD TEST: CPT | Performed by: INTERNAL MEDICINE

## 2025-07-17 RX ADMIN — ACETAMINOPHEN 1000 MG: 500 TABLET, FILM COATED ORAL at 23:43

## 2025-07-17 RX ADMIN — OXYCODONE 5 MG: 5 TABLET ORAL at 04:25

## 2025-07-17 RX ADMIN — RIVAROXABAN 10 MG: 10 TABLET, FILM COATED ORAL at 16:42

## 2025-07-17 RX ADMIN — AMPICILLIN AND SULBACTAM 3 G: 1; 2 INJECTION, POWDER, FOR SOLUTION INTRAMUSCULAR; INTRAVENOUS at 00:03

## 2025-07-17 RX ADMIN — INSULIN LISPRO 3 UNITS: 100 INJECTION, SOLUTION INTRAVENOUS; SUBCUTANEOUS at 00:10

## 2025-07-17 RX ADMIN — INSULIN LISPRO 3 UNITS: 100 INJECTION, SOLUTION INTRAVENOUS; SUBCUTANEOUS at 16:38

## 2025-07-17 RX ADMIN — INSULIN LISPRO 3 UNITS: 100 INJECTION, SOLUTION INTRAVENOUS; SUBCUTANEOUS at 23:44

## 2025-07-17 RX ADMIN — INSULIN GLARGINE-YFGN 15 UNITS: 100 INJECTION, SOLUTION SUBCUTANEOUS at 16:40

## 2025-07-17 RX ADMIN — AMPICILLIN AND SULBACTAM 3 G: 1; 2 INJECTION, POWDER, FOR SOLUTION INTRAMUSCULAR; INTRAVENOUS at 18:00

## 2025-07-17 RX ADMIN — HYDROMORPHONE HYDROCHLORIDE 0.25 MG: 1 INJECTION, SOLUTION INTRAMUSCULAR; INTRAVENOUS; SUBCUTANEOUS at 05:27

## 2025-07-17 RX ADMIN — ACETAMINOPHEN 1000 MG: 500 TABLET, FILM COATED ORAL at 05:35

## 2025-07-17 RX ADMIN — ACETAMINOPHEN 1000 MG: 500 TABLET, FILM COATED ORAL at 13:22

## 2025-07-17 RX ADMIN — Medication 5 MG: at 01:14

## 2025-07-17 RX ADMIN — OXYCODONE 5 MG: 5 TABLET ORAL at 20:44

## 2025-07-17 RX ADMIN — AMPICILLIN AND SULBACTAM 3 G: 1; 2 INJECTION, POWDER, FOR SOLUTION INTRAMUSCULAR; INTRAVENOUS at 13:21

## 2025-07-17 RX ADMIN — AMPICILLIN AND SULBACTAM 3 G: 1; 2 INJECTION, POWDER, FOR SOLUTION INTRAMUSCULAR; INTRAVENOUS at 05:21

## 2025-07-17 RX ADMIN — AMPICILLIN AND SULBACTAM 3 G: 1; 2 INJECTION, POWDER, FOR SOLUTION INTRAMUSCULAR; INTRAVENOUS at 23:43

## 2025-07-17 RX ADMIN — ACETAMINOPHEN 1000 MG: 500 TABLET, FILM COATED ORAL at 16:42

## 2025-07-17 RX ADMIN — Medication 5 MG: at 20:44

## 2025-07-17 ASSESSMENT — ENCOUNTER SYMPTOMS
BACK PAIN: 0
DIARRHEA: 0
FEVER: 0
COUGH: 0
CONSTIPATION: 0
ABDOMINAL PAIN: 0
NAUSEA: 0
CHILLS: 0
VOMITING: 0
PALPITATIONS: 0
HEADACHES: 0
DIZZINESS: 0
SHORTNESS OF BREATH: 0

## 2025-07-17 ASSESSMENT — PAIN DESCRIPTION - PAIN TYPE
TYPE: ACUTE PAIN
TYPE: ACUTE PAIN;PHANTOM PAIN;SURGICAL PAIN
TYPE: ACUTE PAIN;SURGICAL PAIN;PHANTOM PAIN
TYPE: ACUTE PAIN;SURGICAL PAIN;PHANTOM PAIN
TYPE: ACUTE PAIN

## 2025-07-17 ASSESSMENT — FIBROSIS 4 INDEX: FIB4 SCORE: 1.32

## 2025-07-17 NOTE — CARE PLAN
The patient is Stable - Low risk of patient condition declining or worsening    Shift Goals  Clinical Goals: monitor labs, pt safety, pain management  Patient Goals: rest, dischrge home  Family Goals: lydia    Progress made toward(s) clinical / shift goals:        Problem: Skin Integrity  Goal: Skin integrity is maintained or improved  Outcome: Progressing  Note: 1. Assess and monitor skin integrity, appearance and/or temperature 2. Assess risk factors for impaired skin integrity and/or pressures ulcers 3. Implement precautions to protect skin integrity in collaboration with interdisciplinary team 4. Implement pressure ulcer prevention protocol if at risk for skin breakdown 5. Confirm wound care consult if at risk for skin breakdown 6. Ensure patient use of pressure relieving devices (Low air loss bed, waffle overlay, heel protectors, ROHO cushion, etc)      Problem: Knowledge Deficit - Standard  Goal: Patient and family/care givers will demonstrate understanding of plan of care, disease process/condition, diagnostic tests and medications  Outcome: Progressing  Note: Document in Patient Education 1. Patient and family/caregiver oriented to unit, equipment, visitation policy and means for communicating concern 2. Complete/review Learning Assessment 3. Assess knowledge level of disease process/condition, treatment plan, diagnostic tests and medications 4. Explain disease process/condition, treatment plan, diagnostic tests and medications        Patient is not progressing towards the following goals:  NA

## 2025-07-17 NOTE — PROGRESS NOTES
[facility] Medicine Daily Progress Note    Date of Service  7/17/2025    Chief Complaint  Jimmy Amaya is a 70 y.o. male admitted 7/7/2025 with left leg infection with maggots and osteomyelitis    Hospital Course  Patient is a 70-year-old male with chronic diabetes uncontrolled with minimal medical compliance lives alone in a trailer.  He drove himself to the hospital in [place] secondary to the wound on his foot and while in [place] was told that he needed to be flown to [place] for amputation and at that point he signed himself out AGAINST MEDICAL ADVICE and apparently got in his car and drove away.   was called by the hospital for a wellness check and patient was found with his car parked on the side of the freeway and was wandering in the middle of the freeway in [place] when he was picked back up again.  He was taken back to the hospital and then sent here for further evaluation.  The wounds on his foot are covered with maggots which are draining from the abscess.  Initially he was admitted to the intensive care unit and started on aggressive IV antibiotics.  Patient downgraded from the intensive care unit to the telemetry unit and orthopedic surgery consulted.  Patient has osteomyelitis in the 5th and 4th metatarsal on MRI in addition to the severe macerated skin and muscle on the left lateral border.  Patient to be taken to the OR for amputation below the knee.    Interval Problem Update  7/17:  Patient had no acute complaints.  He is afebrile.  I reviewed labs WBC 10.6 from 16.4 after starting IV Unasyn  I discussed with ID Dr. Jamison, finalizing potential plan for antibiotics.  At this time we will continue IV Unasyn, end date 7/19.  Patient should be able to go to [place] and complete IV Unasyn for 1 or 2 more days then switch to oral Augmentin with a end date of 8/6/2025.  Repeat blood culture from 7/15 no growth today.  Patient should not need a midline.  I called daughter Gabby White, no answer. I  left a brief message.    7/16:  Patient had no acute complaints today  I discussed with Dr. Mon[place], [place] [facility], we will continue IV Unasyn.  Will wait on repeat cultures taken on 7/15.  This is possibly a true bacteremia versus pseudo bacteremia given strep viridans.  Unfortunately unable to discharge to [facility] until we clear patient's bacteremia  Wound VAC to be changed, I placed wound care consult, they were consulted on 7/9 but deferred wound care to orthopedic surgeon's recommendation after surgery.  Dr. Youngblood wrote to discontinue DAGOBERTO drain on POD2.       7/15:  Pt still has pain, oxycodone 10mg helping. I explained to patient we should try lower dose and see how controlled his pain is.    WBC remains elevated 16.4  I restarted patient on IV Unasyn to cover Strep and Bacteroides.   Reaching out to [facility] for re-evaluation on blood cultures positive at [place].  Charge RN Elena was able to help and get the cultures from [facility]. 2 of 2 sets growing Strep viridans group.      Addendum: I called patient's daughter Gabby White.  I gave updates on new infection finding and treatment plan.  We are holding off on [facility] for now until we ensure patient is clear from Streptococcus in his bloodstream.    I have discussed this patient's plan of care and discharge plan at IDT rounds today with Case Management, Nursing, Nursing leadership, and other members of the IDT team.    Consultants/Specialty  infectious disease and orthopedics    Code Status  Full Code    Disposition  The patient is not medically cleared for discharge to home or a post-acute facility.  Anticipate discharge to: skilled nursing facility    I have placed the appropriate orders for post-discharge needs.    Review of Systems  Review of Systems   Constitutional:  Negative for chills, fever and malaise/fatigue.   Respiratory:  Negative for cough and shortness of breath.    Cardiovascular:  Negative for  chest pain and palpitations.   Gastrointestinal:  Negative for abdominal pain, constipation, diarrhea, nausea and vomiting.   Musculoskeletal:  Positive for joint pain (left leg, knee). Negative for back pain.   Neurological:  Negative for dizziness and headaches.   All other systems reviewed and are negative.       Physical Exam  Temp:  [36.2 °C (97.1 °F)-36.5 °C (97.7 °F)] 36.3 °C (97.4 °F)  Pulse:  [71-81] 72  Resp:  [19] 19  BP: (107-137)/(65-73) 137/73  SpO2:  [91 %-96 %] 94 %    Physical Exam  Vitals and nursing note reviewed.   Constitutional:       General: He is not in acute distress.     Appearance: He is not diaphoretic.   HENT:      Mouth/Throat:      Mouth: Mucous membranes are dry.      Pharynx: No oropharyngeal exudate.   Cardiovascular:      Rate and Rhythm: Normal rate and regular rhythm.      Pulses: Normal pulses.      Heart sounds: Normal heart sounds. No murmur heard.  Pulmonary:      Effort: Pulmonary effort is normal. No respiratory distress.      Breath sounds: Normal breath sounds. No wheezing or rales.   Abdominal:      General: Bowel sounds are normal. There is no distension.      Tenderness: There is no abdominal tenderness.   Musculoskeletal:         General: No swelling or tenderness. Normal range of motion.      Comments: Left leg BKA, wrapped in dry gauze, wound VAC in place.   Skin:     General: Skin is warm.      Capillary Refill: Capillary refill takes less than 2 seconds.      Coloration: Skin is not jaundiced or pale.   Neurological:      General: No focal deficit present.      Mental Status: He is alert and oriented to person, place, and time. Mental status is at baseline.      Motor: No weakness.   Psychiatric:         Mood and Affect: Mood normal.         Behavior: Behavior normal.         Thought Content: Thought content normal.         Judgment: Judgment normal.         Fluids    Intake/Output Summary (Last 24 hours) at 7/17/2025 8086  Last data filed at 7/17/2025  1400  Gross per 24 hour   Intake 705 ml   Output 3150 ml   Net -2445 ml        Laboratory  Recent Labs     07/15/25  0137 07/17/25  0929   WBC 16.4* 10.6   RBC 4.61* 4.57*   HEMOGLOBIN 12.8* 12.6*   HEMATOCRIT 39.9* 40.0*   MCV 86.6 87.5   MCH 27.8 27.6   MCHC 32.1* 31.5*   RDW 45.4 46.0   PLATELETCT 348 382   MPV 10.4 9.6     Recent Labs     07/15/25  0137 07/17/25  0929   SODIUM 135 136   POTASSIUM 3.9 4.4   CHLORIDE 102 102   CO2 22 26   GLUCOSE 128* 136*   BUN 11 9   CREATININE 0.52 0.64   CALCIUM 8.4* 8.6                   Imaging  US-ONUR SINGLE LEVEL BILAT   Final Result      MR-FOOT-WITH & W/O LEFT   Final Result      1.  There is a large area of necrotic tissue/abscess in the lateral aspect of the forefoot.   2.  Osteomyelitis with necrosis versus intraosseous gas of the fifth metatarsal and the proximal and middle phalanges.   3.  There is also abnormal signal within the fourth metatarsal and proximal phalanx which may be reactive although additional infection cannot be excluded.   4.  There is diffuse soft tissue edema and enhancement which likely represents cellulitis and myositis.      DX-CHEST-PORTABLE (1 VIEW)   Final Result      Elevation of the right hemidiaphragm. Patchy right basilar opacities, atelectasis or infection.           Assessment/Plan  * Diabetic foot infection (HCC)- (present on admission)  Assessment & Plan  Hx of prior wounds on the foot now worse, XR showing osteo, 4/4 SIRS given sepsis bolus at OSH not hypotensive  Continue with cefepime  Purulent track age at the anterior portion of his leg discovered during BKA, area of purulence was washed extensively and cultures obtained and wound incision     [facility] showing patient has bacteremia with Streptococcus alpha, Streptococcus viridans on 2 sets  Wound cultures growing Streptococcus constellatus and Bacteroides fragilis  Wound vac removed yesterday 7/18        Bacteremia- (present on admission)  Assessment & Plan  WBC  16.4, has not downward trended.  Increasing while on oral Augmentin.  We reached out to [facility], 2 of 2 sets growing Strep viridans group. Information scanned to [employer].  Continue Bcx  Repeat Bcx NGTD  Could be real bacteremia vs pseudobacteremia.  I discussed with ID Dr. Jamison today.  continue IV Unasyn, end date 7/19.  Patient should be able to go to [place] and complete IV Unasyn for 1 or 2 more days then switch to oral Augmentin with a end date of 8/6/2025. I asked case management to confirm with [facility].    Sepsis (HCC)- (present on admission)  Assessment & Plan  This is Sepsis Present on admission  SIRS criteria identified on my evaluation include: Fever, with temperature greater than 100.9 deg F, Tachycardia, with heart rate greater than 90 BPM, Tachypnea, with respirations greater than 20 per minute, and Leukocytosis, with WBC greater than 12,000  Clinical indicators of end organ dysfunction include Lactic Acid greater than 2  Source is osteo L foot and bacteremia with Streptococcus viridans  Sepsis protocol initiated  Crystalloid Fluid Administration: Fluid resuscitation ordered per standard protocol - 30 mL/kg per current or ideal body weight at OSH given   antibiotics as appropriate for source of sepsis  Reassessment: I have reassessed the patient's hemodynamic status    blood cultures positive at [facility] - strep alpha and strep viridans  Continue IV Unasyn  Repeat Bcx NGTD    Constipation- (present on admission)  Assessment & Plan  Last bowel movement 7/7  Bowel protocol in place  No results after Dulcolax, start lactulose    7/15: Patient had 5 bowel movements overnight.  7/16: monitoring for BM today. I reduced PRN opioid doses.  7/17: pt had a watery BM yesterday    Hiccups  Assessment & Plan  Baclofen PRN, helping    Acute hypoxic respiratory failure (HCC)- (present on admission)  Assessment & Plan  On room air    Self neglect- (present on  admission)  Assessment & Plan  Patient currently does not have decision making capacity, daughter is NOK for consents  Capacity eval done by psychiatry    Altered mental state- (present on admission)  Assessment & Plan  Exacerbated by his infxn, concern for baseline mentation decline per sister, house is full of garbage and pt had maggots in his wound found wandering highway    Patient is back to his baseline    Type 2 diabetes mellitus with diabetic polyneuropathy (HCC)- (present on admission)  Assessment & Plan  Uncontrolled due to noncompliance, no sign of DKA/HOS  Continue glargine 15 units nightly  Continue insulin sliding scale, Accu-Cheks and hypoglycemic protocol         VTE prophylaxis:    Xarelto 10mg daily as prophylaxis      I have performed a physical exam and reviewed and updated ROS and Plan today (7/17/2025). In review of yesterday's note (7/16/2025), there are no changes except as documented above.      Total time spent 52 minutes.    This included my review of patient's overnight RN notes, face to face interview, physical examination, lab analysis.  Also includes repeat visits with the patient, and my documented assessments and interventions above.  I have spoken with specialist from infectious disease.  In addition, I spoke with entire care team on patient's treatment plan, and DC planning on morning rounds and IDT rounds.    This note was generated using voice recognition software which has a chance of producing errors of grammar and content.  I have made every reasonable attempt to find and correct any errors, but it should be expected that some may not be found prior to finalization of this note.

## 2025-07-17 NOTE — PROGRESS NOTES
"Phlebotomist notified this RN that Pt refused blood draw, he stated his tired of \"poking\". Notified on call MD Mayer. No new order received.   "

## 2025-07-17 NOTE — CARE PLAN
Problem: Skin Integrity  Goal: Skin integrity is maintained or improved  Outcome: Progressing     Problem: Pain - Standard  Goal: Alleviation of pain or a reduction in pain to the patient’s comfort goal  Outcome: Progressing     Problem: Fall Risk  Goal: Patient will remain free from falls  Outcome: Progressing   The patient is Stable - Low risk of patient condition declining or worsening    Shift Goals  Clinical Goals: IV ATB, pain control and mobilize  Patient Goals: pain control and rest  Family Goals: lydia    Progress made toward(s) clinical / shift goals:  Afebrile, no s/s of worsening condition. Left BKA stump CDI. Pain well controlled with scheduled Tylenol and PRN IV Dilaudid. Able to relax. Appears calm. Able to turn and repositioned himself in bed.     Patient is not progressing towards the following goals:

## 2025-07-17 NOTE — PROGRESS NOTES
Infectious Disease Progress Note    Author: Jacquie Jamison M.D. Date & Time of service: 2025  7:09 AM    Chief Complaint:  Follow-up for left necrotic foot    Interval History:   70 y.o. male admitted 2025 for necrotic left foot wound with maggots. S/p BKA   AF WBC decreased to 18.7 refusing skin assessment noted   AF WBC 16.4 reconsulted due to [place] blood cultures +group A strep and strep viridans   patient afebrile,  Wound cultures growing Streptococcus constellatus and Bacteroides fragilis.  Plan of care regarding antibiotics discussed with patient in detail.  Patient states he is going to be transferred to [facility]      Labs Reviewed and Medications Reviewed.    Review of Systems:  Review of Systems   Constitutional:  Negative for chills and fever.       Hemodynamics:  Temp (24hrs), Av.4 °C (97.6 °F), Min:36.2 °C (97.1 °F), Max:36.6 °C (97.9 °F)  Temperature: 36.2 °C (97.1 °F)  Pulse  Av  Min: 66  Max: 114   Blood Pressure : 119/66       Physical Exam:  Physical Exam  Vitals and nursing note reviewed.   Constitutional:       General: He is not in acute distress.     Appearance: He is not toxic-appearing or diaphoretic.   Cardiovascular:      Rate and Rhythm: Normal rate.   Pulmonary:      Effort: Pulmonary effort is normal.   Abdominal:      General: There is no distension.   Musculoskeletal:      Comments: Left BKA site dressed   Skin:     Coloration: Skin is pale.   Neurological:      General: No focal deficit present.         Meds:    Current Facility-Administered Medications:     melatonin    oxyCODONE immediate-release **OR** oxyCODONE immediate-release **OR** HYDROmorphone    ampicillin-sulbactam (UNASYN) IV    rivaroxaban    lactulose    acetaminophen    ondansetron    senna-docusate **AND** polyethylene glycol/lytes    baclofen    insulin GLARGINE    insulin lispro **AND** POC blood glucose manual result **AND** NOTIFY MD and PharmD **AND** Administer 20 grams of  glucose (approximately 8 ounces of fruit juice) every 15 minutes PRN FSBG less than 70 mg/dL **AND** dextrose bolus    ondansetron    Labs:  Recent Labs     07/15/25  0137   WBC 16.4*   RBC 4.61*   HEMOGLOBIN 12.8*   HEMATOCRIT 39.9*   MCV 86.6   MCH 27.8   RDW 45.4   PLATELETCT 348   MPV 10.4     Recent Labs     07/15/25  0137   SODIUM 135   POTASSIUM 3.9   CHLORIDE 102   CO2 22   GLUCOSE 128*   BUN 11     Recent Labs     07/15/25  0137   CREATININE 0.52       Imaging:  US-ONUR SINGLE LEVEL BILAT  Result Date: 2025   Vascular Laboratory  Conclusions  BILATERAL LOWER EXTREMITY ARTERIES:.  The ankle-brachial indices are normal.  ALISSA NOEL  Age:    70    Gender:     M  MRN:    [id number]  :    1955      BSA:  Exam Date:     2025 13:37  Room #:     Inpatient  Priority:     Routine  Ht (in):             Wt (lb):  Ordering Physician:        GEORGI SOLORIO  Referring Physician:       [id number] Serrato  Sonographer:               Danial Nuñez RVOTTONIEL  Study Type:                Complete Bilateral  Technical Quality:         Good  Indications:     Type 2 diabetes mellitus with foot ulcer  CPT Codes:       07321  ICD Codes:       E11.621  History:         diabetic left lateral oozing foot wound; maggots;                   osteomyelitis; sepsis; prior exam 23  Limitations:                 RIGHT  Waveform            Systolic BPs (mmHg)                             128           Brachial  Triphasic                                Common Femoral  Triphasic                                Popliteal  Triphasic                  166           Posterior Tibial  Triphasic                  138           Dorsalis Pedis                                           Digit                             1.30          ONUR                                           TBI                       LEFT  Waveform        Systolic BPs (mmHg)                             128           Brachial  Triphasic                                Common  Femoral  Triphasic                                Popliteal  Triphasic                  140           Posterior Tibial  Triphasic                  136           Dorsalis Pedis                                           Digit                             1.09          ONUR                                           TBI  Findings  BILATERAL LOWER EXTREMITY ARTERIES:  Doppler waveforms of the common femoral artery and popliteal artery are  high amplitude and triphasic.  Doppler waveforms at the ankle are brisk and triphasic.  The ankle-brachial indices are normal.  There is no evidence of significant arterial disease demonstrated on the  right or the left.  Additional testing was not performed in accordance with lower extremity  arterial evaluation protocol.  Benjamín Bhatia MD  (Electronically Signed)  Final Date:      09 July 2025                   21:33    MR-FOOT-WITH & W/O LEFT  Result Date: 7/9/2025 7/8/2025 4:23 PM HISTORY/REASON FOR EXAM:  osteo. Skin ulcer TECHNIQUE/EXAM DESCRIPTION: MRI of the LEFT foot with and without contrast. The study was performed on a [employer] Signa 1.5 Yessica MRI scanner. T1 axial, T1 sagittal, T1 coronal, STIR sagittal, T2 fast spin-echo fat-suppressed coronal, sagittal inversion recovery, and T1 postcontrast coronal images were obtained. 20 mL ProHance contrast was administered intravenously. COMPARISON: 1/3/2023 FINDINGS: There is reticulated low T1 and high T2-weighted signal intensity involving the subcutaneous tissues. This is consistent with edema and may be seen with cellulitis. There is a large gas and fluid collection in the dorsal aspect of the forefoot laterally consistent with abscess. This measures approximately 7.1 cm in transverse dimension, 2.7 cm in dorsal plantar dimension, and 10.9 cm in longitudinal dimension. This wraps around the lateral aspect of the fifth metatarsal and also appears to extend between the fourth and fifth metatarsal bases. Fluid tracks proximally  plantar to the fourth metatarsal to the level of the tarsometatarsal joint. There appears to be a wound or ulcer plantar to the fifth metatarsal head. There is markedly abnormal signal within the fifth metatarsal as well as the proximal and middle phalanges. There is high T2-weighted signal and enhancement with multiple areas of low signal. The low signal may represent intraosseous gas or necrotic bone. There is also some abnormal signal within the fourth metatarsal and proximal phalanx. This may be reactive although additional infection cannot be excluded. There is hallux valgus deformity and first metatarsophalangeal joint osteoarthrosis. There is diffuse T2 hyperintensity and enhancement of the intrinsic foot musculature. This is nonspecific and may be seen with myositis, ischemia, and denervation. There is at least a component of degeneration given fatty infiltration noted on the T1-weighted sequences.     1.  There is a large area of necrotic tissue/abscess in the lateral aspect of the forefoot. 2.  Osteomyelitis with necrosis versus intraosseous gas of the fifth metatarsal and the proximal and middle phalanges. 3.  There is also abnormal signal within the fourth metatarsal and proximal phalanx which may be reactive although additional infection cannot be excluded. 4.  There is diffuse soft tissue edema and enhancement which likely represents cellulitis and myositis.    DX-CHEST-PORTABLE (1 VIEW)  Result Date: 7/8/2025 7/8/2025 11:31 AM HISTORY/REASON FOR EXAM:  Cough Shortness of breath TECHNIQUE/EXAM DESCRIPTION AND NUMBER OF VIEWS: Single portable view of the chest. COMPARISON: None FINDINGS: Elevation of the right hemidiaphragm. Patchy right basilar opacities. No pleural effusion. No pneumothorax. Normal cardiopericardial silhouette.     Elevation of the right hemidiaphragm. Patchy right basilar opacities, atelectasis or infection.      Micro:  Results       Procedure Component Value Units Date/Time     CULTURE WOUND W/ GRAM STAIN [[id number]]  (Abnormal) Collected: 07/09/25 1614    Order Status: Completed Specimen: Wound Updated: 07/16/25 1554     Significant Indicator POS     Source WND     Site Left leg #1     Culture Result -     Gram Stain Result Many WBCs.  Moderate Gram positive cocci.  Few Gram negative rods.       Culture Result Streptococcus constellatus  Heavy growth      Anaerobic Culture [[id number]]  (Abnormal) Collected: 07/09/25 1614    Order Status: Completed Specimen: Wound Updated: 07/16/25 1554     Significant Indicator POS     Source WND     Site Left leg #1     Culture Result -      Bacteroides fragilis  Heavy growth      Fungal Culture [[id number]] Collected: 07/09/25 1614    Order Status: Completed Specimen: Wound Updated: 07/16/25 1554     Significant Indicator NEG     Source WND     Site Left leg #1     Culture Result No fungal growth to date.     Fungal Smear Results No fungal elements seen.    BLOOD CULTURE [[id number]] Collected: 07/15/25 0839    Order Status: Completed Specimen: Blood from Peripheral Updated: 07/15/25 1019     Significant Indicator NEG     Source BLD     Site PERIPHERAL     Culture Result No Growth  Note: Blood cultures are incubated for 5 days and  are monitored continuously.Positive blood cultures  are called to the RN and reported as soon as  they are identified.  Blood culture testing and Gram stain, if indicated, are  performed at [facility],  [address] [place] [address].  Positive blood  cultures are sent to [facility],  [address] [place] [address], for organism identification  and susceptibility testing.      BLOOD CULTURE [[id number]] Collected: 07/15/25 0839    Order Status: Completed Specimen: Blood from Peripheral Updated: 07/15/25 1019     Significant Indicator NEG     Source BLD     Site PERIPHERAL     Culture Result No Growth  Note: Blood cultures are incubated for 5 days and  are monitored  continuously.Positive blood cultures  are called to the RN and reported as soon as  they are identified.  Blood culture testing and Gram stain, if indicated, are  performed at [facility],  [address] [place] [address].  Positive blood  cultures are sent to [facility],  [address] [place] [address], for organism identification  and susceptibility testing.      GRAM STAIN [[id number]] Collected: 07/09/25 1614    Order Status: Completed Specimen: Wound Updated: 07/10/25 1436     Significant Indicator .     Source WND     Site Left leg #1     Gram Stain Result Many WBCs.  Moderate Gram positive cocci.  Few Gram negative rods.      Fungal Smear [[id number]] Collected: 07/09/25 1614    Order Status: Completed Specimen: Wound Updated: 07/10/25 1436     Significant Indicator NEG     Source WND     Site Left leg #1     Fungal Smear Results No fungal elements seen.            Assessment:  Active Hospital Problems    Diagnosis     *Diabetic foot infection (HCC) [E11.628, L08.9]     Hiccups [R06.6]     Altered mental state [R41.82]     Sepsis (HCC) [A41.9]     Self neglect [R46.89]     Acute hypoxic respiratory failure ([facility]) [J96.01]     Type 2 diabetes mellitus with circulatory disorder, without long-term current use of insulin ([facility]) [E11.59]     Type 2 diabetes mellitus with diabetic polyneuropathy ([facility]) [E11.42]      Pertinent diagnoses:  Left necrotic foot with maggots likely status post BKA on 7/9  -Purulence seen in fascia  -MRI c/w OM  -operative culture 7/9 strep and Bacteroides  Bacteremia vs pseudobacteremia in blood cultures from [facility]  -+strep viridans and group A streptococcus 7/7  Psych disorder  Noncompliance-has pulled out IV already  Leukocytosis, decreased     PLAN:   -Status post left BKA on 7/9.  Per the procedure note, purulence noted upon incision into the anterior fascial compartment.  Pathology consistent with acute osteomyelitis; margins viable with  inflammatory changes  -Continue IV Unasyn  -Blood cultures here every now and on 7/15-negative to date  -Plan a 4-week course of antibiotics from 7/9 with stop date of 8/6/2025   Mb BID-Plan a 10-day course of IV Unasyn through 7/19, for bacteremia followed by transitioning to oral Augmentin to complete antibiotic course for osteomyelitis with stop date of 8/6/2025  -Continue wound care    Follow-up in ID clinic with NP 2 weeks postdischarge    BRANDY Carrasco.  ID signing off

## 2025-07-17 NOTE — PROGRESS NOTES
Report received from Day RN. Assumed care. Received on his room, sitting up in bed alert and awake watching TV. POC updated. Pain well controlled with PRN OXY IR and Dilaudid IV. Pt requested to have his bedside commode next to him, bed alarm on. Notified him that he needs to call first if he needs to get up and use the BSC. Call light within reach.

## 2025-07-17 NOTE — PROGRESS NOTES
Per MD Danilo DC wound vac. No output from vac in several days. Wound RN consult placed per MD.     This RN reached out to Wound RN, Isaura, to inquire if she will be at [facility] today. Isaura reports she will not be in until tomorrow. Per Isaura's recommendation, place dry dressing over incision after removing wound vac. Wound RN will follow tomorrow. Wound vac removed by this RN. Pt was medicated for pain before wound care. Incision cleaned, pictures uploaded to chart, and abd dressing placed.

## 2025-07-18 VITALS
BODY MASS INDEX: 28.43 KG/M2 | SYSTOLIC BLOOD PRESSURE: 129 MMHG | HEIGHT: 74 IN | DIASTOLIC BLOOD PRESSURE: 67 MMHG | WEIGHT: 221.56 LBS | HEART RATE: 76 BPM | OXYGEN SATURATION: 92 % | TEMPERATURE: 98.1 F | RESPIRATION RATE: 18 BRPM

## 2025-07-18 LAB
GLUCOSE BLD STRIP.AUTO-MCNC: 129 MG/DL (ref 65–99)
GLUCOSE BLD STRIP.AUTO-MCNC: 162 MG/DL (ref 65–99)

## 2025-07-18 PROCEDURE — 700102 HCHG RX REV CODE 250 W/ 637 OVERRIDE(OP): Performed by: INTERNAL MEDICINE

## 2025-07-18 PROCEDURE — A9270 NON-COVERED ITEM OR SERVICE: HCPCS | Performed by: INTERNAL MEDICINE

## 2025-07-18 PROCEDURE — 99239 HOSP IP/OBS DSCHRG MGMT >30: CPT | Performed by: INTERNAL MEDICINE

## 2025-07-18 PROCEDURE — 94760 N-INVAS EAR/PLS OXIMETRY 1: CPT

## 2025-07-18 PROCEDURE — 700105 HCHG RX REV CODE 258: Performed by: INTERNAL MEDICINE

## 2025-07-18 PROCEDURE — 82962 GLUCOSE BLOOD TEST: CPT | Performed by: INTERNAL MEDICINE

## 2025-07-18 PROCEDURE — 700111 HCHG RX REV CODE 636 W/ 250 OVERRIDE (IP): Mod: JZ | Performed by: INTERNAL MEDICINE

## 2025-07-18 RX ORDER — POLYETHYLENE GLYCOL 3350 17 G/17G
17 POWDER, FOR SOLUTION ORAL
Status: SHIPPED
Start: 2025-07-18

## 2025-07-18 RX ORDER — LACTULOSE 10 G/15ML
15 SOLUTION ORAL
Status: SHIPPED
Start: 2025-07-18

## 2025-07-18 RX ORDER — AMOXICILLIN 250 MG
1 CAPSULE ORAL EVERY EVENING
Status: SHIPPED
Start: 2025-07-18

## 2025-07-18 RX ORDER — ACETAMINOPHEN 500 MG
1000 TABLET ORAL EVERY 6 HOURS
Status: SHIPPED
Start: 2025-07-18

## 2025-07-18 RX ORDER — BACLOFEN 10 MG/1
10 TABLET ORAL 3 TIMES DAILY PRN
Status: SHIPPED
Start: 2025-07-18

## 2025-07-18 RX ORDER — OXYCODONE HYDROCHLORIDE 5 MG/1
5 TABLET ORAL EVERY 6 HOURS PRN
Qty: 20 TABLET | Refills: 0 | Status: SHIPPED | OUTPATIENT
Start: 2025-07-18 | End: 2025-07-23

## 2025-07-18 RX ADMIN — ACETAMINOPHEN 1000 MG: 500 TABLET, FILM COATED ORAL at 04:45

## 2025-07-18 RX ADMIN — AMPICILLIN AND SULBACTAM 3 G: 1; 2 INJECTION, POWDER, FOR SOLUTION INTRAMUSCULAR; INTRAVENOUS at 04:44

## 2025-07-18 RX ADMIN — OXYCODONE 5 MG: 5 TABLET ORAL at 07:42

## 2025-07-18 RX ADMIN — INSULIN LISPRO 3 UNITS: 100 INJECTION, SOLUTION INTRAVENOUS; SUBCUTANEOUS at 11:52

## 2025-07-18 RX ADMIN — AMPICILLIN AND SULBACTAM 3 G: 1; 2 INJECTION, POWDER, FOR SOLUTION INTRAMUSCULAR; INTRAVENOUS at 11:55

## 2025-07-18 ASSESSMENT — FIBROSIS 4 INDEX: FIB4 SCORE: 1.2

## 2025-07-18 ASSESSMENT — PAIN DESCRIPTION - PAIN TYPE
TYPE: ACUTE PAIN
TYPE: ACUTE PAIN

## 2025-07-18 NOTE — DISCHARGE SUMMARY
Discharge Summary    CHIEF COMPLAINT ON ADMISSION  No chief complaint on file.      Reason for Admission  Orthopedics (L foot osteomyelitis)    Admission Date  7/7/2025     CODE STATUS  Full Code    HPI & HOSPITAL COURSE  This is Jimmy Amaya, Patient is a 70-year-old male with chronic diabetes uncontrolled with minimal medical compliance lives alone in a trailer.  He drove himself to the hospital in [place] secondary to the wound on his foot and while in [place] was told that he needed to be flown to [place] for amputation and at that point he signed himself out AGAINST MEDICAL ADVICE and apparently got in his car and drove away.   was called by the hospital for a wellness check and patient was found with his car parked on the side of the freeway and was wandering in the middle of the freeway in [place] when he was picked back up again.  He was taken back to the hospital and then sent here for further evaluation.  The wounds on his foot are covered with maggots which are draining from the abscess.  Initially he was admitted to the intensive care unit and started on aggressive IV antibiotics.  Patient downgraded from the intensive care unit to the telemetry unit and orthopedic surgery consulted.  Patient has osteomyelitis in the 5th and 4th metatarsal on MRI in addition to the severe macerated skin and muscle on the left lateral border.      Patient went to surgery with Dr. Jacques Youngblood ([facility]) on 7/9/25, patient required a left BKA.      [facility] showing patient has bacteremia with Streptococcus alpha, Streptococcus viridans on 2 sets. Wound cultures growing Streptococcus constellatus and Bacteroides fragilis. Wound vac removed 7/18/25.             ID recommendations:  -Continue IV Unasyn  -Plan a 4-week course of antibiotics from 7/9 with stop date of 8/6/2025  -Plan a 10-day course of IV Unasyn through 7/19, for bacteremia followed by transitioning to oral Augmentin to complete  antibiotic course for osteomyelitis with stop date of 8/6/2025      Please update daughter Gabby White [phone].    Therefore, he is discharged in good and stable condition to skilled nursing facility.    The patient met 2-midnight criteria for an inpatient stay at the time of discharge.      FOLLOW UP ITEMS POST DISCHARGE  With [facility]  With PCP    DISCHARGE DIAGNOSES  Principal Problem:    Diabetic foot infection (HCC) (POA: Yes)  Active Problems:    Sepsis (HCC) (POA: Yes)    Bacteremia (POA: Yes)    Constipation (POA: Yes)    Type 2 diabetes mellitus with diabetic polyneuropathy (HCC) (POA: Yes)    Altered mental state (POA: Yes)    Self neglect (Chronic) (POA: Yes)    Acute hypoxic respiratory failure (HCC) (POA: Yes)    Hiccups (POA: Clinically Undetermined)  Resolved Problems:    * No resolved hospital problems. *      FOLLOW UP  No future appointments.  [facility]  [facility] [address]  [phone]          MEDICATIONS ON DISCHARGE     Medication List        START taking these medications        Instructions   acetaminophen 500 MG Tabs  Commonly known as: Tylenol   Take 2 Tablets by mouth every 6 hours.  Dose: 1,000 mg     amoxicillin-clavulanate 875-125 MG Tabs  Commonly known as: Augmentin   Take 1 Tablet by mouth 2 times a day for 19 days.  Dose: 1 Tablet     baclofen 10 MG Tabs  Commonly known as: Lioresal   Take 1 Tablet by mouth 3 times a day as needed (hiccups).  Dose: 10 mg     insulin GLARGINE 100 UNIT/ML Soln  Commonly known as: Julio Cesar Virgen   Inject 15 Units under the skin every evening.  Dose: 15 Units     lactulose 20 GM/30ML Soln   Take 15 mL by mouth 1 time a day as needed (if constipation lasts 2 or more days).  Dose: 15 mL     melatonin 5 MG Tabs   Take 1 Tablet by mouth every evening.  Dose: 5 mg     NS 0.9 % SOLN 100 mL with ampicillin/sulbactam 3 (2-1) g SOLR 3 g   Infuse 3 g into a venous catheter every 6 hours for 1 day.  Dose: 3 g     oxyCODONE  immediate-release 5 MG Tabs  Commonly known as: Roxicodone   Take 1 Tablet by mouth every 6 hours as needed for Severe Pain for up to 5 days.  Dose: 5 mg     polyethylene glycol/lytes 17 g Pack  Commonly known as: Miralax   Take 1 Packet by mouth 1 time a day as needed (if no bowel movement in last 2 days).  Dose: 17 g     rivaroxaban 10 MG Tabs tablet  Commonly known as: Xarelto   Take 1 Tablet by mouth every day at 6 PM. Continue until patient is effectively ambulating  Dose: 10 mg     senna-docusate 8.6-50 MG Tabs  Commonly known as: Pericolace Or Senokot S   Take 1 Tablet by mouth every evening.  Dose: 1 Tablet              Allergies  Allergies[1]    DIET  Orders Placed This Encounter   Procedures    Diet Order Diet: Consistent CHO (Diabetic)     Standing Status:   Standing     Number of Occurrences:   1     Diet::   Consistent CHO (Diabetic) [4]       ACTIVITY  As tolerated and directed by skilled nursing.  Weight bearing as tolerated    LINES, DRAINS, AND WOUNDS  This is an automated list. Peripheral IVs will be removed prior to discharge.  Peripheral IV 07/15/25 20 G Left Antecubital (Active)   Site Assessment Clean;Dry;Intact 07/17/25 2000   Dressing Type Transparent 07/17/25 2000   Line Status Scrubbed the hub prior to access;Flushed;Saline locked 07/17/25 2000   Dressing Status Clean;Dry;Intact 07/17/25 2000   Dressing Intervention N/A 07/17/25 2000   Dressing Change Due 07/19/25 07/17/25 2000   Infiltration Grading ([facility], CV) 0 07/17/25 2000   Phlebitis Scale ([facility] Only) 0 07/17/25 2000       Wound 07/07/25 Anterior Left (Active)   Wound Image   07/08/25 2000   Site Assessment Black;Pink;Red 07/09/25 0900   Periwound Assessment Fragile;Painful 07/09/25 0900   Closure Open to air 07/09/25 0900   Drainage Amount Moderate 07/09/25 0900   Drainage Description Brown 07/09/25 0900   Treatments Cleansed 07/08/25 0119   Wound Cleansing Approved Wound Cleanser;Normal Saline Irrigation 07/08/25 0119   Moisture  Containment Device None 07/08/25 0119   Dressing Status Open to Air 07/09/25 0900   NEXT Weekly Photo (Inpatient Only) 07/09/25 07/08/25 0119       Wound 07/07/25 Back Midline (Active)   Wound Image   07/07/25 2200   Site Assessment Clean;Dry;Intact 07/15/25 0825   Periwound Assessment Clean;Dry;Intact;Pink 07/11/25 0800   Closure Open to air 07/13/25 0745   Drainage Amount None 07/11/25 0800   Dressing Status Clean;Dry;Intact 07/11/25 0800   NEXT Weekly Photo (Inpatient Only) 07/09/25 07/07/25 2200       Wound 07/09/25 Surgical Closed Surgical Incision Knee Anterior Left (Active)   Site Assessment MICHELINE 07/17/25 2000   Periwound Assessment MICHELINE 07/17/25 2000   Margins MICHELINE 07/17/25 2000   Closure MICHELINE 07/17/25 2000   Drainage Amount None 07/17/25 2000   Drainage Description Serosanguineous 07/15/25 2020   Treatments Offloading 07/17/25 2000   Moisture Containment Device Condom Catheter 07/13/25 0745   Dressing Status Clean;Dry;Intact 07/17/25 0900   Dressing Changed New 07/16/25 2135   Dressing Options Absorbent Abdominal Pad;Soft Conforming Roll;Adaptic 07/16/25 2135   Dressing Change/Treatment Frequency As Needed 07/17/25 0900       Peripheral IV 07/15/25 20 G Left Antecubital (Active)   Site Assessment Clean;Dry;Intact 07/17/25 2000   Dressing Type Transparent 07/17/25 2000   Line Status Scrubbed the hub prior to access;Flushed;Saline locked 07/17/25 2000   Dressing Status Clean;Dry;Intact 07/17/25 2000   Dressing Intervention N/A 07/17/25 2000   Dressing Change Due 07/19/25 07/17/25 2000   Infiltration Grading ([facility], CV) 0 07/17/25 2000   Phlebitis Scale ([facility] Only) 0 07/17/25 2000               MENTAL STATUS ON TRANSFER             CONSULTATIONS  [facility] Dr. Jacques Youngblood    PROCEDURES  Left BKA    LABORATORY  Lab Results   Component Value Date    SODIUM 136 07/17/2025    POTASSIUM 4.4 07/17/2025    CHLORIDE 102 07/17/2025    CO2 26 07/17/2025    GLUCOSE 136 (H) 07/17/2025    BUN 9 07/17/2025     CREATININE 0.64 07/17/2025        Lab Results   Component Value Date    WBC 10.6 07/17/2025    HEMOGLOBIN 12.6 (L) 07/17/2025    HEMATOCRIT 40.0 (L) 07/17/2025    PLATELETCT 382 07/17/2025         Latest Reference Range & Units 07/07/25 22:43 07/08/25 03:25 07/09/25 03:38 07/10/25 02:42 07/11/25 00:44 07/12/25 00:41 07/13/25 03:11 07/14/25 01:56 07/15/25 01:37 07/17/25 09:29   WBC 4.8 - 10.8 K/uL 20.3 (H) 20.3 (H) 20.7 (H) 24.9 (H) 18.7 (H) 15.1 (H) 15.6 (H) 14.6 (H) 16.4 (H) 10.6   (H): Data is abnormally high      Susceptibility data from last 90 days.  Collected Specimen Info Organism   07/09/25 Wound Streptococcus constellatus   07/09/25 Wound Bacteroides fragilis         Total time of the discharge process exceeds 38 minutes.         [1] No Known Allergies

## 2025-07-18 NOTE — DISCHARGE PLANNING
DC Transport Scheduled    Transport Company Scheduled:  [facility]    Scheduled Date: 7/18/2025  Scheduled Time: 1230    Transport Type: Wheelchair  Destination: [facility], [address] [place] [id number]    Notified care team of scheduled transport via Voalte.     If there are any changes needed to the DC transportation scheduled, please contact [facility] at ext. [phone] between the hours of 9168-2699. If outside those hours, contact the ED Case Manager at ext. [phone].

## 2025-07-18 NOTE — PROGRESS NOTES
Security delivered pt belongings from safekeeping per pt request in anticipation of discharge today.

## 2025-07-18 NOTE — DISCHARGE PLANNING
Case Management Discharge Planning    Admission Date: 7/7/2025  GMLOS: 9.6  ALOS: 11    6-Clicks ADL Score: 18  6-Clicks Mobility Score: 17  PT and/or OT Eval ordered: Yes  Post-acute Referrals Ordered: Yes  Post-acute Choice Obtained: Yes  Has referral(s) been sent to post-acute provider:  Yes      Anticipated Discharge Dispo: Discharge Disposition: D/T to SNF with Medicare cert in anticipation of skilled care (03)    DME Needed: No    Action(s) Taken:     Spoke to Elle from [place]. Elle confirmed they can accept pt today.    [facility] Transport confirmed for today at 1230 via GMT WC. Pt's daughter Gabby notified via phone call.    COBRA delivered to bedside RN.     Escalations Completed: None    Medically Clear: Yes    Next Steps: DC to [place] SNF today at 1230 via GMT WC.    Barriers to Discharge: None

## 2025-07-18 NOTE — CARE PLAN
The patient is Stable - Low risk of patient condition declining or worsening    Shift Goals  Clinical Goals: Pt safety, monitor labs, pain management  Patient Goals: Sleep and discharge  Family Goals: MICHELINE    Progress made toward(s) clinical / shift goals: Pt is AO x4. Pt had complaints of pain and was administered medication per the MAR. Pt is on room air. Pt was educated on safety and the bed alarm. VSS. Pt refused water but would take medications with diet coke. Pt has no other needs at this time.    Patient is not progressing towards the following goals:

## 2025-07-18 NOTE — PROGRESS NOTES
Bedside report received from Prabha pulliam RN. Patient awake and alert in bed AOx4. Pt is on room air. Pt had complaints of pain in his left leg. Pt was administered medication per the MAR and the 6 rights. Pt given a diet coke and refilled water cup. Bed is locked and in low position with bed alarm on. Reviewed plan of care with patient. Call light within reach. No other needs at this time.

## 2025-07-22 ENCOUNTER — HOSPITAL ENCOUNTER (OUTPATIENT)
Facility: MEDICAL CENTER | Age: 70
End: 2025-07-22
Attending: FAMILY MEDICINE
Payer: COMMERCIAL

## 2025-07-24 LAB
C DIFF TOX A+B STL QL IA: POSITIVE
C DIFF TOX GENS STL QL NAA+PROBE: ABNORMAL

## 2025-08-06 LAB
FUNGUS SPEC CULT: NORMAL
FUNGUS SPEC FUNGUS STN: NORMAL
SIGNIFICANT IND 70042: NORMAL
SITE SITE: NORMAL
SOURCE SOURCE: NORMAL

## (undated) DEVICE — MASK OXYGEN VNYL ADLT MED CONC WITH 7 FOOT TUBING  - (50EA/CA)

## (undated) DEVICE — GLOVE BIOGEL INDICATOR SZ 8 SURGICAL PF LTX - (50/BX 4BX/CA)

## (undated) DEVICE — SODIUM CHL IRRIGATION 0.9% 1000ML (12EA/CA)

## (undated) DEVICE — MASK AIRWAY FLEXIBLE SINGLE-USE SIZE 5 ADULTS (10EA/BX)

## (undated) DEVICE — CANNULA O2 COMFORT SOFT EAR ADULT 7 FT TUBING (50/CA)

## (undated) DEVICE — SUCTION INSTRUMENT YANKAUER OPEN TIP W/O VENT (50EA/CA)

## (undated) DEVICE — COVER LIGHT HANDLE FLEXIBLE - SOFT (2EA/PK 80PK/CA)

## (undated) DEVICE — CLEANER ELECTRO-SURGICAL TIP - (25/BX 4BX/CA)

## (undated) DEVICE — VAC CANISTER W/GEL 500ML - FITS NEW MACHINES (10EA/CA)

## (undated) DEVICE — WATER IRRIGATION STERILE 1000ML (12EA/CA)

## (undated) DEVICE — GLOVE BIOGEL SZ 8 SURGICAL PF LTX - (50PR/BX 4BX/CA)

## (undated) DEVICE — DRAPE LARGE 3 QUARTER - (20/CA)

## (undated) DEVICE — FIBERWIRE 4-0 - (12/BX)

## (undated) DEVICE — CANISTER SUCTION 3000ML MECHANICAL FILTER AUTO SHUTOFF MEDI-VAC NONSTERILE LF DISP  (40EA/CA)

## (undated) DEVICE — SUTURE GENERAL

## (undated) DEVICE — COVER LIGHT HANDLE ALC PLUS DISP (18EA/BX)

## (undated) DEVICE — GLOVE BIOGEL SZ 7.5 SURGICAL PF LTX - (50PR/BX 4BX/CA)

## (undated) DEVICE — CHLORAPREP 26 ML APPLICATOR - ORANGE TINT(25/CA)

## (undated) DEVICE — SUTURE 2-0 VICRYL PLUS CT-1 - 8 X 18 INCH(12/BX)

## (undated) DEVICE — GOWN WARMING STANDARD FLEX - (30/CA)

## (undated) DEVICE — SLEEVE VASO CALF MED - (10PR/CA)

## (undated) DEVICE — BANDAGE ELASTIC 4 HONEYCOMB - 4"X5YD LF (20/CA)"

## (undated) DEVICE — GLOVE BIOGEL PI ORTHO SZ 8.5 PF LF (40/BX)

## (undated) DEVICE — KIT EVACUATER 3 SPRING PVC LF 1/8 DRAIN SIZE (10EA/CA)"

## (undated) DEVICE — ELECTRODE DUAL RETURN W/ CORD - (50/PK)

## (undated) DEVICE — LACTATED RINGERS INJ 1000 ML - (14EA/CA 60CA/PF)

## (undated) DEVICE — Device

## (undated) DEVICE — PADDING CAST 4 IN X 4 YDS - SOF-ROLL (12RL/BG 6BG/CT)

## (undated) DEVICE — DRAPE 36X28IN RAD CARM BND BG - (25/CA) O

## (undated) DEVICE — DRESSING KIT V.A.C. SENSA T.R.A.C. SMALL (10EA/CA)

## (undated) DEVICE — SET LEADWIRE 5 LEAD BEDSIDE DISPOSABLE ECG (1SET OF 5/EA)

## (undated) DEVICE — PAD LAP STERILE 18 X 18 - (5/PK 40PK/CA)

## (undated) DEVICE — SLEEVE, VASO, THIGH, MED

## (undated) DEVICE — PAD PREP 24 X 48 CUFFED - (100/CA)

## (undated) DEVICE — SUTURE 0 VICRYL PLUS CT-1 - 8 X 18 INCH (12/BX)

## (undated) DEVICE — PADDING CAST 6 IN STERILE - 6 X 4 YDS (24/CA)

## (undated) DEVICE — GLOVE BIOGEL INDICATOR SZ 6 SURGICAL PF LTX -(50/BX)

## (undated) DEVICE — TUBING CLEARLINK DUO-VENT - C-FLO (48EA/CA)

## (undated) DEVICE — BLADE SURGICAL #10 - (50/BX)

## (undated) DEVICE — PACK UPPER EXTREMITY (2EA/CA)

## (undated) DEVICE — SUTURE 1 VICRYL PLUS CTX - 8 X 18 INCH (12/BX)

## (undated) DEVICE — TOWEL STOP TIMEOUT SAFETY FLAG (40EA/CA)

## (undated) DEVICE — DRILL BIT

## (undated) DEVICE — PACK LOWER EXTREMITY - (2/CA)

## (undated) DEVICE — SENSOR OXIMETER ADULT SPO2 RD SET (20EA/BX)

## (undated) DEVICE — STOCKINETTE IMPERVIOUS 6 SM (30EA/CA)

## (undated) DEVICE — MEDICINE CUP STERILE 2 OZ - (100/CA)

## (undated) DEVICE — DRAPE U SPLIT IMP 54 X 76 - (24/CA)

## (undated) DEVICE — WRAP COBAN SELF-ADHERENT 6 IN X 5YDS STERILE TAN (12/CA)

## (undated) DEVICE — SUCTION INSTRUMENT YANKAUER BULBOUS TIP W/O VENT (50EA/CA)

## (undated) DEVICE — BLADE SAGITTAL SAW DUAL CUT 75.0 X 25.0MM (1/EA)

## (undated) DEVICE — TUBE CONNECTING SUCTION - CLEAR PLASTIC STERILE 72 IN (50EA/CA)

## (undated) DEVICE — CANISTER SUCTION RIGID RED 1500CC (40EA/CA)

## (undated) DEVICE — KIT  I.V. START (100EA/CA)

## (undated) DEVICE — SUTURE 2-0 MONOCRYL PLUS UNDYED CT-1 1 X 36 (36EA/BX)"

## (undated) DEVICE — PENCIL ELECTSURG 10FT BTN SWH - (50/CA)

## (undated) DEVICE — SUTURE 2-0 ETHILON FS - (36/BX) 18 INCH

## (undated) DEVICE — CORDS BIPOLAR COAGULATION - 12FT STERILE DISP. (10EA/BX)

## (undated) DEVICE — PADDING CAST 4 IN STERILE - 4 X 4 YDS (24/CA)

## (undated) DEVICE — SET EXTENSION WITH 2 PORTS (48EA/CA) ***PART #2C8610 IS A SUBSTITUTE*****

## (undated) DEVICE — DRAPE LOWER EXTREMETY - (6/CA)

## (undated) DEVICE — SUTURE 3-0 ETHILON FS-1 - (36/BX) 30 INCH

## (undated) DEVICE — GOWN SURGEONS X-LARGE - DISP. (30/CA)

## (undated) DEVICE — SUTURE ETHILON 2-0 FSLX 30 (36PK/BX)"

## (undated) DEVICE — SUTURE 4-0 ETHILON FS-2 18 (36PK/BX)"

## (undated) DEVICE — SUTURE 3-0 SILK FS 18 INCH - (12PK/BX)

## (undated) DEVICE — BANDAGE ELASTIC LATEX STERILE VELCRO 4 X 5 YDS (25EA/CA)